# Patient Record
Sex: FEMALE | Race: WHITE | NOT HISPANIC OR LATINO | Employment: OTHER | URBAN - METROPOLITAN AREA
[De-identification: names, ages, dates, MRNs, and addresses within clinical notes are randomized per-mention and may not be internally consistent; named-entity substitution may affect disease eponyms.]

---

## 2023-08-21 ENCOUNTER — HOSPITAL ENCOUNTER (OUTPATIENT)
Dept: DATA CONVERSION | Facility: HOSPITAL | Age: 78
Discharge: HOME | End: 2023-08-21
Payer: MEDICARE

## 2023-08-21 DIAGNOSIS — M79.672 PAIN IN LEFT FOOT: ICD-10-CM

## 2023-08-25 PROBLEM — G47.33 OSA (OBSTRUCTIVE SLEEP APNEA): Status: ACTIVE | Noted: 2023-08-25

## 2023-08-25 PROBLEM — E78.5 HYPERLIPIDEMIA: Status: ACTIVE | Noted: 2023-08-25

## 2023-08-25 PROBLEM — E03.9 HYPOTHYROIDISM: Status: ACTIVE | Noted: 2023-08-25

## 2023-08-25 PROBLEM — I49.1 PAC (PREMATURE ATRIAL CONTRACTION): Status: ACTIVE | Noted: 2023-08-25

## 2023-08-25 PROBLEM — N95.2 POSTMENOPAUSAL ATROPHIC VAGINITIS: Status: ACTIVE | Noted: 2023-08-25

## 2023-08-25 PROBLEM — R89.9 ABNORMAL LABORATORY TEST RESULT: Status: ACTIVE | Noted: 2023-08-25

## 2023-08-25 PROBLEM — I48.4 ATYPICAL ATRIAL FLUTTER (MULTI): Status: ACTIVE | Noted: 2023-08-25

## 2023-08-25 PROBLEM — R40.0 DAYTIME SLEEPINESS: Status: ACTIVE | Noted: 2023-08-25

## 2023-08-25 PROBLEM — M81.0 OSTEOPOROSIS: Status: ACTIVE | Noted: 2023-08-25

## 2023-08-25 PROBLEM — I10 ESSENTIAL HYPERTENSION: Status: ACTIVE | Noted: 2023-08-25

## 2023-08-25 PROBLEM — I48.0 PAROXYSMAL ATRIAL FIBRILLATION (MULTI): Status: ACTIVE | Noted: 2023-08-25

## 2023-08-25 RX ORDER — CHOLECALCIFEROL (VITAMIN D3) 50 MCG
2000 TABLET ORAL DAILY
COMMUNITY
End: 2024-03-17 | Stop reason: HOSPADM

## 2023-08-25 RX ORDER — ESTRADIOL 10 UG/1
10 INSERT VAGINAL 2 TIMES WEEKLY
COMMUNITY
End: 2024-02-02 | Stop reason: SDUPTHER

## 2023-08-25 RX ORDER — FUROSEMIDE 20 MG/1
1 TABLET ORAL DAILY
COMMUNITY
Start: 2023-01-05

## 2023-08-25 RX ORDER — APIXABAN 5 MG/1
5 TABLET, FILM COATED ORAL 2 TIMES DAILY
COMMUNITY
Start: 2023-05-28 | End: 2023-12-19 | Stop reason: SDUPTHER

## 2023-08-25 RX ORDER — METOPROLOL SUCCINATE 25 MG/1
0.5 TABLET, EXTENDED RELEASE ORAL DAILY
COMMUNITY
Start: 2023-01-05 | End: 2023-12-19 | Stop reason: SDUPTHER

## 2023-08-25 RX ORDER — FLECAINIDE ACETATE 50 MG/1
1 TABLET ORAL 2 TIMES DAILY
COMMUNITY
End: 2023-12-19 | Stop reason: SDUPTHER

## 2023-08-25 RX ORDER — TRIAMTERENE/HYDROCHLOROTHIAZID 37.5-25 MG
0.5 TABLET ORAL EVERY MORNING
COMMUNITY
End: 2023-12-21 | Stop reason: SDUPTHER

## 2023-08-25 RX ORDER — SIMVASTATIN 20 MG/1
20 TABLET, FILM COATED ORAL DAILY
COMMUNITY
Start: 2023-08-05 | End: 2024-02-02 | Stop reason: SDUPTHER

## 2023-08-25 RX ORDER — DENOSUMAB 60 MG/ML
INJECTION SUBCUTANEOUS
COMMUNITY
End: 2024-03-17 | Stop reason: HOSPADM

## 2023-08-25 RX ORDER — LEVOTHYROXINE SODIUM 50 UG/1
TABLET ORAL DAILY
COMMUNITY
End: 2023-12-21 | Stop reason: SDUPTHER

## 2023-09-15 VITALS
HEIGHT: 65 IN | WEIGHT: 163 LBS | DIASTOLIC BLOOD PRESSURE: 60 MMHG | SYSTOLIC BLOOD PRESSURE: 126 MMHG | OXYGEN SATURATION: 95 % | BODY MASS INDEX: 27.16 KG/M2 | HEART RATE: 84 BPM

## 2023-10-11 ENCOUNTER — OFFICE VISIT (OUTPATIENT)
Dept: PRIMARY CARE | Facility: CLINIC | Age: 78
End: 2023-10-11
Payer: MEDICARE

## 2023-10-11 VITALS — DIASTOLIC BLOOD PRESSURE: 70 MMHG | SYSTOLIC BLOOD PRESSURE: 120 MMHG | HEART RATE: 66 BPM | OXYGEN SATURATION: 99 %

## 2023-10-11 DIAGNOSIS — M81.0 AGE-RELATED OSTEOPOROSIS WITHOUT CURRENT PATHOLOGICAL FRACTURE: ICD-10-CM

## 2023-10-11 DIAGNOSIS — M25.561 CHRONIC PAIN OF RIGHT KNEE: ICD-10-CM

## 2023-10-11 DIAGNOSIS — I48.4 ATYPICAL ATRIAL FLUTTER (MULTI): ICD-10-CM

## 2023-10-11 DIAGNOSIS — E78.2 MIXED HYPERLIPIDEMIA: ICD-10-CM

## 2023-10-11 DIAGNOSIS — G47.33 OSA (OBSTRUCTIVE SLEEP APNEA): ICD-10-CM

## 2023-10-11 DIAGNOSIS — I48.0 PAROXYSMAL ATRIAL FIBRILLATION (MULTI): ICD-10-CM

## 2023-10-11 DIAGNOSIS — I10 ESSENTIAL HYPERTENSION: Primary | ICD-10-CM

## 2023-10-11 DIAGNOSIS — E03.9 ACQUIRED HYPOTHYROIDISM: ICD-10-CM

## 2023-10-11 DIAGNOSIS — G89.29 CHRONIC PAIN OF RIGHT KNEE: ICD-10-CM

## 2023-10-11 PROBLEM — R89.9 ABNORMAL LABORATORY TEST RESULT: Status: RESOLVED | Noted: 2023-08-25 | Resolved: 2023-10-11

## 2023-10-11 PROBLEM — R40.0 DAYTIME SLEEPINESS: Status: RESOLVED | Noted: 2023-08-25 | Resolved: 2023-10-11

## 2023-10-11 PROCEDURE — 99214 OFFICE O/P EST MOD 30 MIN: CPT | Performed by: INTERNAL MEDICINE

## 2023-10-11 PROCEDURE — 1036F TOBACCO NON-USER: CPT | Performed by: INTERNAL MEDICINE

## 2023-10-11 PROCEDURE — 3078F DIAST BP <80 MM HG: CPT | Performed by: INTERNAL MEDICINE

## 2023-10-11 PROCEDURE — 1125F AMNT PAIN NOTED PAIN PRSNT: CPT | Performed by: INTERNAL MEDICINE

## 2023-10-11 PROCEDURE — 3074F SYST BP LT 130 MM HG: CPT | Performed by: INTERNAL MEDICINE

## 2023-10-11 PROCEDURE — 2500000004 HC RX 250 GENERAL PHARMACY W/ HCPCS (ALT 636 FOR OP/ED): Mod: JZ | Performed by: INTERNAL MEDICINE

## 2023-10-11 RX ADMIN — DENOSUMAB 60 MG: 60 INJECTION SUBCUTANEOUS at 10:23

## 2023-10-11 ASSESSMENT — ENCOUNTER SYMPTOMS
PSYCHIATRIC NEGATIVE: 1
CONSTITUTIONAL NEGATIVE: 1
LOSS OF SENSATION IN FEET: 0
DYSURIA: 0
COUGH: 0
MYALGIAS: 1
OCCASIONAL FEELINGS OF UNSTEADINESS: 0
ACTIVITY CHANGE: 0
DIARRHEA: 0
ABDOMINAL PAIN: 0
ARTHRALGIAS: 1
CONSTIPATION: 0
DEPRESSION: 0
CARDIOVASCULAR NEGATIVE: 1
SHORTNESS OF BREATH: 0

## 2023-10-11 ASSESSMENT — COLUMBIA-SUICIDE SEVERITY RATING SCALE - C-SSRS
6. HAVE YOU EVER DONE ANYTHING, STARTED TO DO ANYTHING, OR PREPARED TO DO ANYTHING TO END YOUR LIFE?: NO
2. HAVE YOU ACTUALLY HAD ANY THOUGHTS OF KILLING YOURSELF?: NO
1. IN THE PAST MONTH, HAVE YOU WISHED YOU WERE DEAD OR WISHED YOU COULD GO TO SLEEP AND NOT WAKE UP?: NO

## 2023-10-11 ASSESSMENT — PATIENT HEALTH QUESTIONNAIRE - PHQ9
2. FEELING DOWN, DEPRESSED OR HOPELESS: NOT AT ALL
1. LITTLE INTEREST OR PLEASURE IN DOING THINGS: NOT AT ALL
SUM OF ALL RESPONSES TO PHQ9 QUESTIONS 1 AND 2: 0

## 2023-10-11 ASSESSMENT — PAIN SCALES - GENERAL: PAINLEVEL: 7

## 2023-10-11 NOTE — PROGRESS NOTES
Subjective   Patient ID: Jessy Mcdaniel is a 78 y.o. female who presents for Follow-up.    Hyperlipidemia  Jessy Mcdaniel is a 78 y.o. female who presents for follow-up of dyslipidemia. A repeat fasting lipid profile was done. LDL 46  4/2023. The patient does not use medications that may worsen dyslipidemias (corticosteroids, progestins, anabolic steroids, diuretics, beta-blockers, amiodarone, cyclosporine, olanzapine). Exercise: intermittently. Previous history of cardiac disease includes: none.    Hypothyroidism  Jessy Mcdaniel is a 78 y.o. female who presents for follow up of hypothyroidism. Current symptoms: none . Patient denies heat / cold intolerance. Symptoms have been well-controlled.  Last TSH nl  4/2023    Atrial Fibrillationnon  Patient is a 78 y.o. female who presents for follow-up of atrial fibrillation and atrial flutter. Symptoms include none. Have been stable since that time. Associated symptoms include: none. The patient denies dizziness, leg swelling, palpitations, and rapid heart beat. The patient has a past history of atrial fibrillation and atrial flutter.      BP Readings from Last 1 Encounters:  08/21/23 : 126/60       Wakes in middle of night w/ excrutiating pain around R knee. Eases after awhile when changes position. Can't walk  as far because R knee hurts and she is limping.  Began about 6 mths ago-slowly worsening    Osteopoerosis on prolia-last DEXA 2022           Review of Systems   Constitutional: Negative.  Negative for activity change.   Respiratory:  Negative for cough and shortness of breath.    Cardiovascular: Negative.    Gastrointestinal:  Negative for abdominal pain, constipation and diarrhea.   Genitourinary:  Negative for dysuria.   Musculoskeletal:  Positive for arthralgias and myalgias.        R knee pain   Skin:  Negative for rash.   Psychiatric/Behavioral: Negative.          Excited about great-granddtr now expecting another       Objective   /70   Pulse 66   LMP   (LMP Unknown)   SpO2 99%     Physical Exam  Constitutional:       General: She is not in acute distress.     Appearance: Normal appearance.   Cardiovascular:      Rate and Rhythm: Normal rate and regular rhythm.      Heart sounds: Normal heart sounds. No murmur heard.     No gallop.   Pulmonary:      Breath sounds: Normal breath sounds. No wheezing, rhonchi or rales.   Musculoskeletal:         General: Swelling and tenderness present.      Comments: Unable to fully straighten R knee w/o clear effusion   Skin:     General: Skin is warm and dry.      Findings: No rash.   Neurological:      General: No focal deficit present.      Mental Status: She is alert and oriented to person, place, and time.   Psychiatric:         Mood and Affect: Mood normal.         Assessment/Plan        Jessy was seen today for follow-up.  Diagnoses and all orders for this visit:  Essential hypertension (Primary)  Atypical atrial flutter (CMS/HCC)  Mixed hyperlipidemia  Paroxysmal atrial fibrillation (CMS/HCC)  Acquired hypothyroidism  Age-related osteoporosis without current pathological fracture  -     denosumab (Prolia) injection 60 mg  SHANDA (obstructive sleep apnea)  Chronic pain of right knee  -     Referral to Orthopaedic Surgery; Future  Other orders  -     Follow Up In Primary Care - Established; Future

## 2023-10-23 PROBLEM — H25.13 NUCLEAR SCLEROTIC CATARACT OF BOTH EYES: Status: ACTIVE | Noted: 2018-10-15

## 2023-10-23 PROBLEM — I48.20 CHRONIC ATRIAL FIBRILLATION (MULTI): Status: ACTIVE | Noted: 2018-10-15

## 2023-10-23 PROBLEM — H25.10 NUCLEAR SCLEROSIS: Status: ACTIVE | Noted: 2018-08-07

## 2023-10-23 RX ORDER — PREDNISOLONE ACETATE 10 MG/ML
1 SUSPENSION/ DROPS OPHTHALMIC 2 TIMES DAILY
COMMUNITY
Start: 2020-05-04 | End: 2024-01-19 | Stop reason: ALTCHOICE

## 2023-10-23 RX ORDER — ALENDRONATE SODIUM 70 MG/1
70 TABLET ORAL
COMMUNITY
End: 2024-01-19 | Stop reason: ALTCHOICE

## 2023-10-23 RX ORDER — AMLODIPINE BESYLATE 5 MG/1
5 TABLET ORAL
COMMUNITY
End: 2024-01-19 | Stop reason: ALTCHOICE

## 2023-10-23 RX ORDER — AMIODARONE HYDROCHLORIDE 200 MG/1
200 TABLET ORAL
COMMUNITY
End: 2023-12-21 | Stop reason: ALTCHOICE

## 2023-10-26 ENCOUNTER — HOSPITAL ENCOUNTER (OUTPATIENT)
Dept: RADIOLOGY | Facility: CLINIC | Age: 78
Discharge: HOME | End: 2023-10-26
Payer: MEDICARE

## 2023-10-26 ENCOUNTER — OFFICE VISIT (OUTPATIENT)
Dept: ORTHOPEDIC SURGERY | Facility: CLINIC | Age: 78
End: 2023-10-26
Payer: MEDICARE

## 2023-10-26 VITALS — BODY MASS INDEX: 27.16 KG/M2 | WEIGHT: 163 LBS | HEIGHT: 65 IN

## 2023-10-26 DIAGNOSIS — M25.561 RIGHT KNEE PAIN, UNSPECIFIED CHRONICITY: Primary | ICD-10-CM

## 2023-10-26 DIAGNOSIS — M17.11 OSTEOARTHRITIS OF RIGHT KNEE, UNSPECIFIED OSTEOARTHRITIS TYPE: ICD-10-CM

## 2023-10-26 DIAGNOSIS — M25.562 LEFT KNEE PAIN, UNSPECIFIED CHRONICITY: ICD-10-CM

## 2023-10-26 DIAGNOSIS — M17.12 OSTEOARTHRITIS OF LEFT KNEE, UNSPECIFIED OSTEOARTHRITIS TYPE: ICD-10-CM

## 2023-10-26 DIAGNOSIS — M25.569 KNEE PAIN: ICD-10-CM

## 2023-10-26 PROCEDURE — 1160F RVW MEDS BY RX/DR IN RCRD: CPT | Performed by: ORTHOPAEDIC SURGERY

## 2023-10-26 PROCEDURE — 73560 X-RAY EXAM OF KNEE 1 OR 2: CPT | Mod: 50,FY

## 2023-10-26 PROCEDURE — 20610 DRAIN/INJ JOINT/BURSA W/O US: CPT | Mod: 50 | Performed by: ORTHOPAEDIC SURGERY

## 2023-10-26 PROCEDURE — 99203 OFFICE O/P NEW LOW 30 MIN: CPT | Performed by: ORTHOPAEDIC SURGERY

## 2023-10-26 PROCEDURE — 1159F MED LIST DOCD IN RCRD: CPT | Performed by: ORTHOPAEDIC SURGERY

## 2023-10-26 PROCEDURE — 1036F TOBACCO NON-USER: CPT | Performed by: ORTHOPAEDIC SURGERY

## 2023-10-26 PROCEDURE — 2500000004 HC RX 250 GENERAL PHARMACY W/ HCPCS (ALT 636 FOR OP/ED): Performed by: ORTHOPAEDIC SURGERY

## 2023-10-26 PROCEDURE — 2500000005 HC RX 250 GENERAL PHARMACY W/O HCPCS: Performed by: ORTHOPAEDIC SURGERY

## 2023-10-26 PROCEDURE — 1125F AMNT PAIN NOTED PAIN PRSNT: CPT | Performed by: ORTHOPAEDIC SURGERY

## 2023-10-26 PROCEDURE — 99213 OFFICE O/P EST LOW 20 MIN: CPT | Performed by: ORTHOPAEDIC SURGERY

## 2023-10-26 RX ORDER — LIDOCAINE HYDROCHLORIDE 10 MG/ML
1 INJECTION INFILTRATION; PERINEURAL
Status: COMPLETED | OUTPATIENT
Start: 2023-10-26 | End: 2023-10-26

## 2023-10-26 RX ORDER — TRIAMCINOLONE ACETONIDE 40 MG/ML
40 INJECTION, SUSPENSION INTRA-ARTICULAR; INTRAMUSCULAR
Status: COMPLETED | OUTPATIENT
Start: 2023-10-26 | End: 2023-10-26

## 2023-10-26 RX ADMIN — TRIAMCINOLONE ACETONIDE 40 MG: 400 INJECTION, SUSPENSION INTRA-ARTICULAR; INTRAMUSCULAR at 15:33

## 2023-10-26 RX ADMIN — LIDOCAINE HYDROCHLORIDE 1 ML: 10 INJECTION, SOLUTION INFILTRATION; PERINEURAL at 15:33

## 2023-10-26 ASSESSMENT — PAIN - FUNCTIONAL ASSESSMENT: PAIN_FUNCTIONAL_ASSESSMENT: 0-10

## 2023-10-26 ASSESSMENT — ENCOUNTER SYMPTOMS
LOSS OF SENSATION IN FEET: 0
OCCASIONAL FEELINGS OF UNSTEADINESS: 1
DEPRESSION: 0

## 2023-10-26 ASSESSMENT — PAIN SCALES - GENERAL: PAINLEVEL_OUTOF10: 6

## 2023-10-26 NOTE — PROGRESS NOTES
Subjective    Patient ID: Jessy Mcdaniel is a 78 y.o. female.    Chief Complaint  Last Surgery: No surgery found  Last Surgery Date: No surgery found      Objective   Ortho Exam  HEENT: No bruising, otorrhea, rhinorrhea. MUSCULOSKELETAL: Neck: No tenderness. No pain or limitation with range of motion. Back: No tenderness. Straight leg test negative bilaterally. Right and left hips: Nontender. No pain or limitation with ROM. Right and left knee: There is pain, crepitus with and limitation of ROM. McMurrays is negative. Anterior drawer and lachmans are negative. No effusion present. The knees are somewhat unstable to valgus and varus stressing. The patient walks with a painful gait favoring both knees while walking.  Image strays of the right and left knees done and read in the office today are reviewed with the patient in the office today and show severe osteoarthritis of the right and left knees with loss of joint surface cartilage, spurring, sclerosis and bone-on-bone worse on the right knee.  No acute fracture is evident.  Results:  XR knee 1-2 views bilateral  These images are not reportable by radiology and will not be interpreted   by  Radiologists.      Assessment/Plan   Encounter Diagnoses:  Right knee pain, unspecified chronicity    Left knee pain, unspecified chronicity    Osteoarthritis of right knee, unspecified osteoarthritis type    Osteoarthritis of left knee, unspecified osteoarthritis type    No orders of the defined types were placed in this encounter.  Options are discussed with the patient in detail. The patient is instructed regarding activity modification, ice, physician directed at home gentle strengthening and ROM exercises, and the appropriate use of Tylenol as needed for pain with its potential adverse reactions and side effects. The patient understands. The patient states that despite all the treatment listed above that this left and right knee pain is debilitating and  requests a  discussion of further options. Cortisone injection to the left and right knee is discussed in the office today. This is done in the office today. See procedures below. Return as needed, Please note that this report has been produced using speech recognition software.  It may contain errors related to grammar, punctuation or spelling.  Electronically signed, but not reviewed.   Patient ID: Jessy Mcdaniel is a 78 y.o. female.    L Inj/Asp: bilateral knee on 10/26/2023 3:33 PM  Indications: pain  Details: 22 G needle, medial approach  Medications (Right): 40 mg triamcinolone acetonide 40 mg/mL; 1 mL lidocaine 10 mg/mL (1 %)  Medications (Left): 40 mg triamcinolone acetonide 40 mg/mL; 1 mL lidocaine 10 mg/mL (1 %)  Outcome: tolerated well, no immediate complications  Procedure, treatment alternatives, risks and benefits explained, specific risks discussed. Consent was given by the patient. Immediately prior to procedure a time out was called to verify the correct patient, procedure, equipment, support staff and site/side marked as required. Patient was prepped and draped in the usual sterile fashion.         No follow-ups on file.

## 2023-12-14 ENCOUNTER — OFFICE VISIT (OUTPATIENT)
Dept: ORTHOPEDIC SURGERY | Facility: CLINIC | Age: 78
End: 2023-12-14
Payer: MEDICARE

## 2023-12-14 VITALS — WEIGHT: 162.92 LBS | BODY MASS INDEX: 27.11 KG/M2

## 2023-12-14 DIAGNOSIS — M25.561 CHRONIC PAIN OF RIGHT KNEE: ICD-10-CM

## 2023-12-14 DIAGNOSIS — M17.11 OSTEOARTHRITIS OF RIGHT KNEE, UNSPECIFIED OSTEOARTHRITIS TYPE: Primary | ICD-10-CM

## 2023-12-14 DIAGNOSIS — M25.562 LEFT KNEE PAIN, UNSPECIFIED CHRONICITY: ICD-10-CM

## 2023-12-14 DIAGNOSIS — G89.29 CHRONIC PAIN OF RIGHT KNEE: ICD-10-CM

## 2023-12-14 DIAGNOSIS — M17.12 OSTEOARTHRITIS OF LEFT KNEE, UNSPECIFIED OSTEOARTHRITIS TYPE: ICD-10-CM

## 2023-12-14 PROCEDURE — 1159F MED LIST DOCD IN RCRD: CPT | Performed by: ORTHOPAEDIC SURGERY

## 2023-12-14 PROCEDURE — 99213 OFFICE O/P EST LOW 20 MIN: CPT | Performed by: ORTHOPAEDIC SURGERY

## 2023-12-14 PROCEDURE — 1160F RVW MEDS BY RX/DR IN RCRD: CPT | Performed by: ORTHOPAEDIC SURGERY

## 2023-12-14 PROCEDURE — 1036F TOBACCO NON-USER: CPT | Performed by: ORTHOPAEDIC SURGERY

## 2023-12-14 PROCEDURE — 1125F AMNT PAIN NOTED PAIN PRSNT: CPT | Performed by: ORTHOPAEDIC SURGERY

## 2023-12-14 ASSESSMENT — PATIENT HEALTH QUESTIONNAIRE - PHQ9
SUM OF ALL RESPONSES TO PHQ9 QUESTIONS 1 AND 2: 0
1. LITTLE INTEREST OR PLEASURE IN DOING THINGS: NOT AT ALL
2. FEELING DOWN, DEPRESSED OR HOPELESS: NOT AT ALL

## 2023-12-14 ASSESSMENT — PAIN - FUNCTIONAL ASSESSMENT: PAIN_FUNCTIONAL_ASSESSMENT: 0-10

## 2023-12-14 ASSESSMENT — PAIN DESCRIPTION - DESCRIPTORS: DESCRIPTORS: ACHING;TIGHTNESS

## 2023-12-14 ASSESSMENT — LIFESTYLE VARIABLES: TOTAL SCORE: 0

## 2023-12-14 ASSESSMENT — ENCOUNTER SYMPTOMS
LOSS OF SENSATION IN FEET: 0
OCCASIONAL FEELINGS OF UNSTEADINESS: 0
DEPRESSION: 0

## 2023-12-14 ASSESSMENT — PAIN SCALES - GENERAL: PAINLEVEL_OUTOF10: 6

## 2023-12-14 NOTE — PROGRESS NOTES
Subjective    Patient ID: Jessy Mcdaniel is a 78 y.o. female.    History:    Subjective      Chief Complaint   Patient presents with    Right Knee - Pain    Left Knee - Pain        No surgery found     HPI  This   78 year young  woman presents today with  right knee pain (8/10). The patient states that this  right knee pain has been worsening and persistent for  years. The patient denies trauma or injury to the  right knee. The patient states that the  right knee pain is worse with and aggravated by  getting from a sitting to a standing position, attempting to go up or down stairs and also  walking and standing. The patient states that this  right knee pain impairs their ability to complete normal activities of daily living. The patient has tried Tylenol and ibuprofen with no relief.    CARDIOLOGY:   Negative for chest pain, shortness of breath.   RESPIRATORY:   Negative for chest pain, shortness of breath.   MUSCULOSKELETAL:   See HPI for details.   NEUROLOGY:   Negative for tingling, numbness, weakness.    Objective    There were no vitals filed for this visit.    Physical Exam  GENERAL:          General Appearance:  This is a pleasant patient with appropriate affect, in no acute distress.   DERMATOLOGY:          Skin: skin at the neck, upper and lower back, and trunk is intact. There is no evidence of skin rash, skin breakdown or ulceration, or atrophic skin change.   EXTREMITIES:          Vascular:  Right, left hands and feet are warm with good color and pulses. Right and left calf and thigh are nontender and nonswollen.   NEUROLOGICAL:          Orientation:  Patient is alert and oriented to person, place, time and situation. Right and left upper and lower extremity motor and sensory examinations are intact.      MUSCULOSKELETAL: Neck: Nontender. No pain with range of motion.  back: No tenderness. Straight leg test negative bilaterally. Right and left hips: Nontender. No pain or limitation with ROM. Right and  left knees:: There is diffuse tenderness over the knee. There is pain  with, crepitus with and limitation with ROM. McMurrays is negative. Anterior drawer and lachmans are negative. No effusion present. The knee is stable to valgus and varus stressing. The patient walks with a painful gait favoring the  right knee while walking.    No results found.    previous x-rays of the right knee show severe osteoarthritis of the right knee with severe loss of joint surface cartilage.  I reviewed these x-rays with the patient in the office today  Jessy was seen today for pain and pain.  Diagnoses and all orders for this visit:  Osteoarthritis of right knee, unspecified osteoarthritis type (Primary)  Left knee pain, unspecified chronicity  Chronic pain of right knee  -     Referral to Orthopaedic Surgery  Osteoarthritis of left knee, unspecified osteoarthritis type  Other orders  -     Cancel: L Inj/Asp       Nic Guerra MD       Chief Complaint  Last Surgery: No surgery found  Last Surgery Date: No surgery found      Objective   Ortho Exam  HEENT: No bruising, otorrhea, rhinorrhea. MUSCULOSKELETAL: Neck: No tenderness. No pain or limitation with range of motion. Back: No tenderness. Straight leg test negative bilaterally. Right and left hips: Nontender. No pain or limitation with ROM. Right and left knee: There is pain, crepitus with and limitation of ROM. McMurrays is negative. Anterior drawer and lachmans are negative. No effusion present. The knees are somewhat unstable to valgus and varus stressing. The patient walks with a painful gait favoring both knees while walking.  Image strays of the right and left knees done and read in the office today are reviewed with the patient in the office today and show severe osteoarthritis of the right and left knees with loss of joint surface cartilage, spurring, sclerosis and bone-on-bone worse on the right knee.  No acute fracture is evident.  Results:  XR knee 1-2 views  bilateral  These images are not reportable by radiology and will not be interpreted   by  Radiologists.    Patient ID: Jessy Mcdaniel is a 78 y.o. female.    Procedures  Assessment/Plan   Encounter Diagnoses:  Osteoarthritis of right knee, unspecified osteoarthritis type    Left knee pain, unspecified chronicity    Chronic pain of right knee    Osteoarthritis of left knee, unspecified osteoarthritis type    No orders of the defined types were placed in this encounter.   options are discussed with the patient in detail. The patient is instructed regarding ice, activity modification and risk for further injury with falling or trauma, and the use of a cane at all times for balance and support and continuing with physician directed at home gentle strengthening and range of motion exercises, and the appropriate use of Tylenol as needed for pain with its potential adverse reactions and side effects. The patient understands. The patient states that this right knee pain is debilitating. The patient states that since this right knee pain has recurred that it is disabling and impairs the patient´s ability to walk and do other activities that are important. AP and lateral x-rays show osteoarthritis of the right knee that is worse at the medial compartment with loss of joint surface cartilage, bone on bone and sclerosis. Right total knee replacement with indications, alternatives, potential risks including but not limited to blood loss, blood clot, nerve damage, infection, death and stroke, benefits, unforseen risks, the rehab involved and the fact that no guarantee can be made were all discussed with the patient in detail. The patient understands, accepts and wishes to proceed because of the persistent right knee pain and the fact that activity modification, gentle strengthening and ROM exercises, physical therapy, Tylenol, ibuprofen, cortisone injections and visco supplementation did not relieve the right knee pain and  because this right knee pain impairs the patient´s ability to complete the normal activities of daily living . I agree. We will be setting this up at a time that is convenient for the patient and as the schedule allows. The patient is to follow up as needed. Please note that this report has been produced using speech recognition software.  It may contain errors related to grammar, punctuation or spelling.  Electronically signed, but not reviewed.Return as needed, Please note that this report has been produced using speech recognition software.  It may contain errors related to grammar, punctuation or spelling.  Electronically signed, but not reviewed.   Patient ID: Jessy Mcdaniel is a 78 y.o. female.      No follow-ups on file.

## 2023-12-19 DIAGNOSIS — I48.0 PAROXYSMAL ATRIAL FIBRILLATION (MULTI): ICD-10-CM

## 2023-12-19 DIAGNOSIS — I49.1 PAC (PREMATURE ATRIAL CONTRACTION): ICD-10-CM

## 2023-12-20 RX ORDER — FLECAINIDE ACETATE 50 MG/1
50 TABLET ORAL 2 TIMES DAILY
Qty: 180 TABLET | Refills: 3 | Status: SHIPPED | OUTPATIENT
Start: 2023-12-20 | End: 2023-12-21 | Stop reason: SDUPTHER

## 2023-12-20 RX ORDER — APIXABAN 5 MG/1
5 TABLET, FILM COATED ORAL 2 TIMES DAILY
Qty: 180 TABLET | Refills: 3 | Status: SHIPPED | OUTPATIENT
Start: 2023-12-20 | End: 2023-12-21 | Stop reason: SDUPTHER

## 2023-12-20 RX ORDER — METOPROLOL SUCCINATE 25 MG/1
12.5 TABLET, EXTENDED RELEASE ORAL DAILY
Qty: 45 TABLET | Refills: 3 | Status: SHIPPED | OUTPATIENT
Start: 2023-12-20 | End: 2024-01-19 | Stop reason: ALTCHOICE

## 2023-12-21 ENCOUNTER — OFFICE VISIT (OUTPATIENT)
Dept: CARDIOLOGY | Facility: CLINIC | Age: 78
End: 2023-12-21
Payer: MEDICARE

## 2023-12-21 ENCOUNTER — LAB (OUTPATIENT)
Dept: LAB | Facility: LAB | Age: 78
End: 2023-12-21
Payer: MEDICARE

## 2023-12-21 VITALS — DIASTOLIC BLOOD PRESSURE: 62 MMHG | SYSTOLIC BLOOD PRESSURE: 128 MMHG

## 2023-12-21 DIAGNOSIS — I49.1 PAC (PREMATURE ATRIAL CONTRACTION): ICD-10-CM

## 2023-12-21 DIAGNOSIS — I48.0 PAROXYSMAL ATRIAL FIBRILLATION (MULTI): ICD-10-CM

## 2023-12-21 LAB
ALBUMIN SERPL-MCNC: 4.4 G/DL (ref 3.5–5)
ALP BLD-CCNC: 58 U/L (ref 35–125)
ALT SERPL-CCNC: 9 U/L (ref 5–40)
ANION GAP SERPL CALC-SCNC: 10 MMOL/L
AST SERPL-CCNC: 15 U/L (ref 5–40)
BASOPHILS # BLD AUTO: 0.01 X10*3/UL (ref 0–0.1)
BASOPHILS NFR BLD AUTO: 0.2 %
BILIRUB SERPL-MCNC: 0.4 MG/DL (ref 0.1–1.2)
BUN SERPL-MCNC: 19 MG/DL (ref 8–25)
CALCIUM SERPL-MCNC: 9.6 MG/DL (ref 8.5–10.4)
CHLORIDE SERPL-SCNC: 104 MMOL/L (ref 97–107)
CO2 SERPL-SCNC: 28 MMOL/L (ref 24–31)
CREAT SERPL-MCNC: 0.9 MG/DL (ref 0.4–1.6)
EOSINOPHIL # BLD AUTO: 0.03 X10*3/UL (ref 0–0.4)
EOSINOPHIL NFR BLD AUTO: 0.6 %
ERYTHROCYTE [DISTWIDTH] IN BLOOD BY AUTOMATED COUNT: 13.4 % (ref 11.5–14.5)
GFR SERPL CREATININE-BSD FRML MDRD: 65 ML/MIN/1.73M*2
GLUCOSE SERPL-MCNC: 99 MG/DL (ref 65–99)
HCT VFR BLD AUTO: 37.7 % (ref 36–46)
HGB BLD-MCNC: 11.7 G/DL (ref 12–16)
IMM GRANULOCYTES # BLD AUTO: 0.01 X10*3/UL (ref 0–0.5)
IMM GRANULOCYTES NFR BLD AUTO: 0.2 % (ref 0–0.9)
LYMPHOCYTES # BLD AUTO: 1.66 X10*3/UL (ref 0.8–3)
LYMPHOCYTES NFR BLD AUTO: 34.1 %
MCH RBC QN AUTO: 30.9 PG (ref 26–34)
MCHC RBC AUTO-ENTMCNC: 31 G/DL (ref 32–36)
MCV RBC AUTO: 100 FL (ref 80–100)
MONOCYTES # BLD AUTO: 0.36 X10*3/UL (ref 0.05–0.8)
MONOCYTES NFR BLD AUTO: 7.4 %
NEUTROPHILS # BLD AUTO: 2.8 X10*3/UL (ref 1.6–5.5)
NEUTROPHILS NFR BLD AUTO: 57.5 %
NRBC BLD-RTO: 0 /100 WBCS (ref 0–0)
PLATELET # BLD AUTO: 289 X10*3/UL (ref 150–450)
POTASSIUM SERPL-SCNC: 4.4 MMOL/L (ref 3.4–5.1)
PROT SERPL-MCNC: 6.7 G/DL (ref 5.9–7.9)
RBC # BLD AUTO: 3.79 X10*6/UL (ref 4–5.2)
SODIUM SERPL-SCNC: 142 MMOL/L (ref 133–145)
WBC # BLD AUTO: 4.9 X10*3/UL (ref 4.4–11.3)

## 2023-12-21 PROCEDURE — 93005 ELECTROCARDIOGRAM TRACING: CPT | Performed by: INTERNAL MEDICINE

## 2023-12-21 PROCEDURE — 80053 COMPREHEN METABOLIC PANEL: CPT

## 2023-12-21 PROCEDURE — 1036F TOBACCO NON-USER: CPT | Performed by: INTERNAL MEDICINE

## 2023-12-21 PROCEDURE — 3078F DIAST BP <80 MM HG: CPT | Performed by: INTERNAL MEDICINE

## 2023-12-21 PROCEDURE — 3074F SYST BP LT 130 MM HG: CPT | Performed by: INTERNAL MEDICINE

## 2023-12-21 PROCEDURE — 36415 COLL VENOUS BLD VENIPUNCTURE: CPT

## 2023-12-21 PROCEDURE — 93010 ELECTROCARDIOGRAM REPORT: CPT | Performed by: INTERNAL MEDICINE

## 2023-12-21 PROCEDURE — 1125F AMNT PAIN NOTED PAIN PRSNT: CPT | Performed by: INTERNAL MEDICINE

## 2023-12-21 PROCEDURE — 99214 OFFICE O/P EST MOD 30 MIN: CPT | Performed by: INTERNAL MEDICINE

## 2023-12-21 PROCEDURE — 1159F MED LIST DOCD IN RCRD: CPT | Performed by: INTERNAL MEDICINE

## 2023-12-21 PROCEDURE — 1160F RVW MEDS BY RX/DR IN RCRD: CPT | Performed by: INTERNAL MEDICINE

## 2023-12-21 PROCEDURE — 85025 COMPLETE CBC W/AUTO DIFF WBC: CPT

## 2023-12-21 RX ORDER — LEVOTHYROXINE SODIUM 50 UG/1
50 TABLET ORAL DAILY
Qty: 30 TABLET | Refills: 3 | Status: SHIPPED | OUTPATIENT
Start: 2023-12-21 | End: 2024-04-25 | Stop reason: SDUPTHER

## 2023-12-21 RX ORDER — APIXABAN 5 MG/1
5 TABLET, FILM COATED ORAL 2 TIMES DAILY
Qty: 180 TABLET | Refills: 3 | Status: SHIPPED | OUTPATIENT
Start: 2023-12-21

## 2023-12-21 RX ORDER — FLECAINIDE ACETATE 50 MG/1
50 TABLET ORAL 2 TIMES DAILY
Qty: 180 TABLET | Refills: 3 | Status: SHIPPED | OUTPATIENT
Start: 2023-12-21 | End: 2024-12-15

## 2023-12-21 RX ORDER — TRIAMTERENE/HYDROCHLOROTHIAZID 37.5-25 MG
0.5 TABLET ORAL EVERY MORNING
Qty: 15 TABLET | Refills: 3 | Status: SHIPPED | OUTPATIENT
Start: 2023-12-21 | End: 2024-04-22 | Stop reason: SDUPTHER

## 2023-12-21 NOTE — PROGRESS NOTES
No chief complaint on file.           The patient is a 78-year-old female who is well-known to me.  She has a history of paroxysmal atrial fibrillation and flutter post catheter-based therapy in 2019.  She was initially maintained on amiodarone but had ophthalmologic complications and she has been on flecainide.  This in the setting of preserved LV systolic function and calcium coronary CT score of 0..  She is here today in regular follow-up.  She is doing quite well from the atrial fibrillation standpoint with no recurrent arrhythmias.  However she is having quite a bit of difficulty with her right knee pain.  She has been receiving cortisone injections but is scheduled to have total knee replacement in the near future.         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    Tear of medial meniscus of knee 02/09/2009    Tear of lateral cartilage or meniscus of knee, current 02/09/2009    Pain in joint, lower leg 07/12/2010    Nuclear sclerotic cataract of both eyes 10/15/2018    Nuclear sclerosis 08/07/2018    Chronic atrial fibrillation (CMS/HCC) 10/15/2018    Right knee pain 10/26/2023    Left knee pain 10/26/2023    Osteoarthritis of right knee 10/26/2023     Resolved Ambulatory Problems     Diagnosis Date Noted    Abnormal laboratory test result 08/25/2023    Daytime sleepiness 08/25/2023     Past Medical History:   Diagnosis Date    Atrial fibrillation and flutter (CMS/HCC)     Bilateral knee pain     Colon cancer (CMS/HCC)     High cholesterol     Hypertension     Thyroid condition         Review of Systems   All other systems reviewed and are  negative.       Objective     There were no vitals filed for this visit.     Vitals and nursing note reviewed.   Constitutional:       Appearance: Healthy appearance.   HENT:    Mouth/Throat:      Pharynx: Oropharynx is clear.   Pulmonary:      Effort: Pulmonary effort is normal.      Breath sounds: Normal breath sounds.   Cardiovascular:      PMI at left midclavicular line. Normal rate. Regular rhythm. Normal S1. Normal S2.       Murmurs: There is a grade 1/6 holosystolic murmur.      No gallop.  No click. No rub.   Pulses:     Intact distal pulses.   Edema:     Peripheral edema absent.   Abdominal:      General: Bowel sounds are normal.   Musculoskeletal:      Cervical back: Normal range of motion. Skin:     General: Skin is warm and dry.   Neurological:      General: No focal deficit present.      Mental Status: Alert and oriented to person, place and time.            Lab Review:   No visits with results within 2 Month(s) from this visit.   Latest known visit with results is:   Legacy Encounter on 04/25/2023   Component Date Value    Vitamin B12 04/25/2023 1,182 (H)     Calcium 04/25/2023 10.0     AST 04/25/2023 14     Alkaline Phosphatase 04/25/2023 66     Total Bilirubin 04/25/2023 0.3     Total Protein 04/25/2023 6.8     Albumin 04/25/2023 4.2     Globulin, Total 04/25/2023 2.6     A/G Ratio 04/25/2023 1.6     Sodium 04/25/2023 139     Potassium 04/25/2023 4.0     Chloride 04/25/2023 99     Bicarbonate 04/25/2023 29     Anion Gap 04/25/2023 11     Urea Nitrogen 04/25/2023 22     Creatinine 04/25/2023 1.1     Urea Nitrogen/Creatinine* 04/25/2023 20.0     Glucose 04/25/2023 102 (H)     ALT (SGPT) 04/25/2023 7     ESTIMATED GFR 04/25/2023 51     Differential Type 04/25/2023 AUTO DIFF     Immature Granulocyte %, * 04/25/2023 0.20     Neutrophil 04/25/2023 65.70     Lymphocytes % 04/25/2023 25.50     Monocytes % 04/25/2023 7.30     Eosinophil 04/25/2023 1.00     Basophils % 04/25/2023 0.30     Immature Granulocytes  Ab* 04/25/2023 0.01     Neutrophils Absolute 04/25/2023 4.13     Lymphocytes Absolute 04/25/2023 1.60     Monocytes Absolute 04/25/2023 0.46     Eosinophils Absolute 04/25/2023 0.06     Basophils Absolute 04/25/2023 0.02     WBC 04/25/2023 6.3     RBC 04/25/2023 3.64 (L)     Hemoglobin 04/25/2023 11.7 (L)     Hematocrit 04/25/2023 36.0     MCV 04/25/2023 98.9     MCH 04/25/2023 32.1     MCHC 04/25/2023 32.5     RDW-SD 04/25/2023 48.3     RDW-CV 04/25/2023 13.2     Platelets 04/25/2023 269     MPV 04/25/2023 10.7     nRBC 04/25/2023 0     ABSOLUTE NEUTROPHIL CALC* 04/25/2023 4.13     Folate 04/25/2023 13.7     Iron 04/25/2023 76     TIBC 04/25/2023 323     Iron Saturation 04/25/2023 23.5     SERUM COLOR 04/25/2023 YELLOW     SERUM CLARITY 04/25/2023 CLEAR     Is the patient fasting? 04/25/2023 NO     Cholesterol 04/25/2023 132 (L)     Triglycerides 04/25/2023 142     HDL 04/25/2023 58     LDL Calculated 04/25/2023 46 (L)     Cholesterol/HDL Ratio 04/25/2023 2.3     Thyroid Stimulating Horm* 04/25/2023 3.46        ECG:  Normal sinus rhythm at 60 bpm MD interval 204 ms QRS duration 100 ms QTc 420 ms    Assessment/plan:  Continue current regimen.  The patient is to have no cardiac contraindications to planned and necessary procedures    Problem List Items Addressed This Visit    None

## 2024-01-19 ENCOUNTER — HOSPITAL ENCOUNTER (OUTPATIENT)
Dept: RADIOLOGY | Facility: CLINIC | Age: 79
Discharge: HOME | End: 2024-01-19
Payer: MEDICARE

## 2024-01-19 ENCOUNTER — OFFICE VISIT (OUTPATIENT)
Dept: ORTHOPEDIC SURGERY | Facility: CLINIC | Age: 79
End: 2024-01-19
Payer: MEDICARE

## 2024-01-19 VITALS — BODY MASS INDEX: 27.11 KG/M2 | WEIGHT: 162.92 LBS

## 2024-01-19 DIAGNOSIS — M25.569 KNEE PAIN: ICD-10-CM

## 2024-01-19 DIAGNOSIS — G89.29 CHRONIC PAIN OF RIGHT KNEE: Primary | ICD-10-CM

## 2024-01-19 DIAGNOSIS — M25.361 OTHER INSTABILITY, RIGHT KNEE: ICD-10-CM

## 2024-01-19 DIAGNOSIS — M25.561 CHRONIC PAIN OF RIGHT KNEE: Primary | ICD-10-CM

## 2024-01-19 DIAGNOSIS — M17.11 OSTEOARTHRITIS OF RIGHT KNEE, UNSPECIFIED OSTEOARTHRITIS TYPE: ICD-10-CM

## 2024-01-19 PROCEDURE — 1125F AMNT PAIN NOTED PAIN PRSNT: CPT | Performed by: ORTHOPAEDIC SURGERY

## 2024-01-19 PROCEDURE — 1159F MED LIST DOCD IN RCRD: CPT | Performed by: ORTHOPAEDIC SURGERY

## 2024-01-19 PROCEDURE — 1160F RVW MEDS BY RX/DR IN RCRD: CPT | Performed by: ORTHOPAEDIC SURGERY

## 2024-01-19 PROCEDURE — 1036F TOBACCO NON-USER: CPT | Performed by: ORTHOPAEDIC SURGERY

## 2024-01-19 PROCEDURE — 99214 OFFICE O/P EST MOD 30 MIN: CPT | Performed by: ORTHOPAEDIC SURGERY

## 2024-01-19 PROCEDURE — 73560 X-RAY EXAM OF KNEE 1 OR 2: CPT | Mod: BILATERAL PROCEDURE | Performed by: ORTHOPAEDIC SURGERY

## 2024-01-19 PROCEDURE — 73560 X-RAY EXAM OF KNEE 1 OR 2: CPT | Mod: 50

## 2024-01-19 RX ORDER — CEFAZOLIN SODIUM 2 G/100ML
2 INJECTION, SOLUTION INTRAVENOUS EVERY 8 HOURS
Status: CANCELLED | OUTPATIENT
Start: 2024-03-13

## 2024-01-19 RX ORDER — SODIUM CHLORIDE 9 MG/ML
100 INJECTION, SOLUTION INTRAVENOUS CONTINUOUS
Status: CANCELLED | OUTPATIENT
Start: 2024-03-13

## 2024-01-19 ASSESSMENT — ENCOUNTER SYMPTOMS
OCCASIONAL FEELINGS OF UNSTEADINESS: 0
DEPRESSION: 0
LOSS OF SENSATION IN FEET: 0

## 2024-01-19 ASSESSMENT — LIFESTYLE VARIABLES: TOTAL SCORE: 0

## 2024-01-19 ASSESSMENT — PAIN SCALES - GENERAL: PAINLEVEL_OUTOF10: 9

## 2024-01-19 ASSESSMENT — PAIN - FUNCTIONAL ASSESSMENT: PAIN_FUNCTIONAL_ASSESSMENT: 0-10

## 2024-01-19 ASSESSMENT — PAIN DESCRIPTION - DESCRIPTORS: DESCRIPTORS: ACHING

## 2024-01-19 NOTE — PROGRESS NOTES
Subjective    Patient ID: Jessy Mcdaniel is a 78 y.o. female.    History:    Subjective      Chief Complaint   Patient presents with    Left Knee - Pain    Right Knee - Pain        No surgery found     HPI  This   78 year young  woman presents today with  right knee pain (8/10). The patient states that this  right knee pain has been worsening and persistent for  years. The patient denies trauma or injury to the  right knee. The patient states that the  right knee pain is worse with and aggravated by  getting from a sitting to a standing position, attempting to go up or down stairs and also  walking and standing. The patient states that this  right knee pain impairs their ability to complete normal activities of daily living. She has tried cortisone injection with no relief of the right knee pain. The patient has tried Tylenol and ibuprofen with no relief.    JOINT REPLACEMENT HISTORY    Primary joint affected: right knee    Duration of symptoms: years    Joint replacement related history:  Osteoarthritis Severe  Avascular necrosis: No    Failed nonsurgical treatment:   NSAID/ COXIB: Yes  Weight loss: Yes  Physical therapy: No  Intra-articular injection: Yes  Braces, orthotics, or assistive devices: Yes    Radiological indications for replacement:   Subchondral cyst: Yes  Subchondral sclerosis: Yes  Periarticular osteophytes: Yes  Joint subluxation: Yes  Joint space narrowing: Yes    Highest level of walking support:   Cane, wheelchair or walker    Pain History:   Pain at rest: Severe  Pain when weigth bearing: Severe  Pain with passive ROM: Severe  Pain related ADL limitation: Severe  Abnormal findings on physical exam: Severe    Ability to walk without significant pain:  Household ambulator or less than 1 block    Is the patients current pain regimen controlling their joint pain? No     CARDIOLOGY:   Negative for chest pain, shortness of breath.   RESPIRATORY:   Negative for chest pain, shortness of breath.    MUSCULOSKELETAL:   See HPI for details.   NEUROLOGY:   Negative for tingling, numbness, weakness.    Objective    There were no vitals filed for this visit.    Physical Exam  GENERAL:          General Appearance:  This is a pleasant patient with appropriate affect, in no acute distress.   DERMATOLOGY:          Skin: skin at the neck, upper and lower back, and trunk is intact. There is no evidence of skin rash, skin breakdown or ulceration, or atrophic skin change.   EXTREMITIES:          Vascular:  Right, left hands and feet are warm with good color and pulses. Right and left calf and thigh are nontender and nonswollen.   NEUROLOGICAL:          Orientation:  Patient is alert and oriented to person, place, time and situation. Right and left upper and lower extremity motor and sensory examinations are intact.      MUSCULOSKELETAL: Neck: Nontender. No pain with range of motion.  back: No tenderness. Straight leg test negative bilaterally. Right and left hips: Nontender. No pain or limitation with ROM. Right and left knees:: There is diffuse tenderness over the knee. There is pain  with, crepitus with and limitation with ROM. McMurrays is negative. Anterior drawer and lachmans are negative. No effusion present. The knee is stable to valgus and varus stressing. The patient walks with a painful gait favoring the  right knee while walking.     x-rays of the right knee show done and read in the office today show severe osteoarthritis of the right knee with severe loss of joint surface cartilage.  I reviewed these x-rays with the patient in the office today  Jessy was seen today for pain and pain.  Diagnoses and all orders for this visit:  Chronic pain of right knee (Primary)  Osteoarthritis of right knee, unspecified osteoarthritis type  Other instability, right knee    Assessment/Plan   Encounter Diagnoses:  Chronic pain of right knee    Osteoarthritis of right knee, unspecified osteoarthritis type    Other instability,  right knee    No orders of the defined types were placed in this encounter.   options are discussed with the patient in detail. The patient is instructed regarding ice, activity modification and risk for further injury with falling or trauma, and the use of a cane at all times for balance and support and continuing with physician directed at home gentle strengthening and range of motion exercises, and the appropriate use of Tylenol as needed for pain with its potential adverse reactions and side effects. The patient understands. The patient states that this right knee pain is debilitating. The patient states that since this right knee pain has recurred that it is disabling and impairs the patient´s ability to walk and do other activities that are important. AP and lateral x-rays show osteoarthritis of the right knee that is worse at the medial compartment with loss of joint surface cartilage, bone on bone and sclerosis. Right total knee replacement with indications, alternatives, potential risks including but not limited to blood loss, blood clot, nerve damage, infection, death and stroke, benefits, unforseen risks, the rehab involved and the fact that no guarantee can be made were all discussed with the patient in detail. The patient understands, accepts and wishes to proceed because of the persistent right knee pain and the fact that activity modification, gentle strengthening and ROM exercises, physical therapy, Tylenol, ibuprofen, cortisone injections did not relieve the right knee pain and because this right knee pain impairs the patient´s ability to complete the normal activities of daily living . I agree. We will be setting this up at a time that is convenient for the patient and as the schedule allows. The patient is to follow up as needed. Please note that this report has been produced using speech recognition software.  It may contain errors related to grammar, punctuation or spelling.  Electronically  signed, but not reviewed.Return as needed, Please note that this report has been produced using speech recognition software.  It may contain errors related to grammar, punctuation or spelling.  Electronically signed, but not reviewed.   Patient ID: Jessy Mcdaniel is a 78 y.o. female.      No follow-ups on file.

## 2024-02-02 ENCOUNTER — TELEPHONE (OUTPATIENT)
Dept: PRIMARY CARE | Facility: CLINIC | Age: 79
End: 2024-02-02
Payer: MEDICARE

## 2024-02-02 DIAGNOSIS — E78.2 MIXED HYPERLIPIDEMIA: Primary | ICD-10-CM

## 2024-02-02 DIAGNOSIS — N95.2 ATROPHIC VAGINITIS: ICD-10-CM

## 2024-02-02 RX ORDER — ESTRADIOL 10 UG/1
10 INSERT VAGINAL 2 TIMES WEEKLY
Qty: 30 TABLET | Refills: 3 | Status: SHIPPED | OUTPATIENT
Start: 2024-02-05 | End: 2024-03-17 | Stop reason: HOSPADM

## 2024-02-02 RX ORDER — SIMVASTATIN 20 MG/1
20 TABLET, FILM COATED ORAL DAILY
Qty: 90 TABLET | Refills: 3 | Status: SHIPPED | OUTPATIENT
Start: 2024-02-02 | End: 2024-03-17 | Stop reason: HOSPADM

## 2024-02-02 NOTE — TELEPHONE ENCOUNTER
Pt came into the office after hours to request a refill for Simvastatin 20mg once daily and Estradiol Vaginal Inserts 10mcg 2x weekly to [facility] in [place].

## 2024-02-13 ENCOUNTER — OFFICE VISIT (OUTPATIENT)
Dept: PRIMARY CARE | Facility: CLINIC | Age: 79
End: 2024-02-13
Payer: MEDICARE

## 2024-02-13 VITALS
DIASTOLIC BLOOD PRESSURE: 60 MMHG | HEART RATE: 70 BPM | OXYGEN SATURATION: 99 % | WEIGHT: 164 LBS | BODY MASS INDEX: 27.29 KG/M2 | SYSTOLIC BLOOD PRESSURE: 122 MMHG

## 2024-02-13 DIAGNOSIS — I10 ESSENTIAL HYPERTENSION: Primary | ICD-10-CM

## 2024-02-13 DIAGNOSIS — G89.29 CHRONIC PAIN OF LEFT KNEE: ICD-10-CM

## 2024-02-13 DIAGNOSIS — E03.9 ACQUIRED HYPOTHYROIDISM: ICD-10-CM

## 2024-02-13 DIAGNOSIS — M81.0 AGE-RELATED OSTEOPOROSIS WITHOUT CURRENT PATHOLOGICAL FRACTURE: ICD-10-CM

## 2024-02-13 DIAGNOSIS — N95.2 POSTMENOPAUSAL ATROPHIC VAGINITIS: ICD-10-CM

## 2024-02-13 DIAGNOSIS — M25.562 CHRONIC PAIN OF LEFT KNEE: ICD-10-CM

## 2024-02-13 DIAGNOSIS — I48.20 CHRONIC ATRIAL FIBRILLATION (MULTI): ICD-10-CM

## 2024-02-13 DIAGNOSIS — G47.33 OSA (OBSTRUCTIVE SLEEP APNEA): ICD-10-CM

## 2024-02-13 DIAGNOSIS — E78.2 MIXED HYPERLIPIDEMIA: ICD-10-CM

## 2024-02-13 DIAGNOSIS — N39.0 ACUTE UTI: ICD-10-CM

## 2024-02-13 LAB
POC APPEARANCE, URINE: ABNORMAL
POC BILIRUBIN, URINE: NEGATIVE
POC BLOOD, URINE: ABNORMAL
POC COLOR, URINE: YELLOW
POC GLUCOSE, URINE: NEGATIVE MG/DL
POC KETONES, URINE: NEGATIVE MG/DL
POC LEUKOCYTES, URINE: ABNORMAL
POC NITRITE,URINE: NEGATIVE
POC PH, URINE: 7 PH
POC PROTEIN, URINE: NEGATIVE MG/DL
POC SPECIFIC GRAVITY, URINE: 1.02
POC UROBILINOGEN, URINE: 0.2 EU/DL

## 2024-02-13 PROCEDURE — 1160F RVW MEDS BY RX/DR IN RCRD: CPT | Performed by: INTERNAL MEDICINE

## 2024-02-13 PROCEDURE — 3078F DIAST BP <80 MM HG: CPT | Performed by: INTERNAL MEDICINE

## 2024-02-13 PROCEDURE — 99214 OFFICE O/P EST MOD 30 MIN: CPT | Performed by: INTERNAL MEDICINE

## 2024-02-13 PROCEDURE — 3074F SYST BP LT 130 MM HG: CPT | Performed by: INTERNAL MEDICINE

## 2024-02-13 PROCEDURE — 1125F AMNT PAIN NOTED PAIN PRSNT: CPT | Performed by: INTERNAL MEDICINE

## 2024-02-13 PROCEDURE — 1159F MED LIST DOCD IN RCRD: CPT | Performed by: INTERNAL MEDICINE

## 2024-02-13 PROCEDURE — 87086 URINE CULTURE/COLONY COUNT: CPT | Mod: WESLAB | Performed by: INTERNAL MEDICINE

## 2024-02-13 PROCEDURE — 81002 URINALYSIS NONAUTO W/O SCOPE: CPT | Performed by: INTERNAL MEDICINE

## 2024-02-13 PROCEDURE — 1036F TOBACCO NON-USER: CPT | Performed by: INTERNAL MEDICINE

## 2024-02-13 ASSESSMENT — ENCOUNTER SYMPTOMS
OCCASIONAL FEELINGS OF UNSTEADINESS: 0
CONSTITUTIONAL NEGATIVE: 1
DYSURIA: 1
CARDIOVASCULAR NEGATIVE: 1
ABDOMINAL PAIN: 0
DEPRESSION: 0
FREQUENCY: 1
MYALGIAS: 1
DIARRHEA: 0
CONSTIPATION: 0
PSYCHIATRIC NEGATIVE: 1
ARTHRALGIAS: 1
LOSS OF SENSATION IN FEET: 0
ACTIVITY CHANGE: 0
SHORTNESS OF BREATH: 0
COUGH: 0

## 2024-02-13 ASSESSMENT — PATIENT HEALTH QUESTIONNAIRE - PHQ9
1. LITTLE INTEREST OR PLEASURE IN DOING THINGS: NOT AT ALL
SUM OF ALL RESPONSES TO PHQ9 QUESTIONS 1 AND 2: 0
2. FEELING DOWN, DEPRESSED OR HOPELESS: NOT AT ALL

## 2024-02-13 ASSESSMENT — PAIN SCALES - GENERAL: PAINLEVEL: 5

## 2024-02-13 NOTE — PROGRESS NOTES
Subjective   Patient ID: Jessy Mcdaniel is a 78 y.o. female who presents for 4 MONTH FOLLOW UP.    Hypertension-compliant and stable on current medications  BP Readings from Last 3 Encounters:  02/13/24 : 122/60  12/21/23 : 128/62  10/11/23 : 120/70       Atrial Fibrillation--stable on meds-remains on eliquis. Managed by Dr Aguila    Hyperlipidemia-stable and compliant on meds. Taking simvastatin. Coronary calcium score 0 in 2022. Lab Results       Component                Value               Date                       LDLCALC                  46 (L)              04/25/2023               Hypothyroid-stable on meds- Lab Results       Component                Value               Date                       TSH                      3.46                04/25/2023               Sleep apnea--uses CPAP nightly with benefit    Exercise-limited by knee        Diet -eats healthy    Scheduled for knee replacement-cleared by Dr Aguila         Review of Systems   Constitutional: Negative.  Negative for activity change.   Respiratory:  Negative for cough and shortness of breath.    Cardiovascular: Negative.    Gastrointestinal:  Negative for abdominal pain, constipation and diarrhea.   Genitourinary:  Positive for dysuria (on and off, no burning) and frequency.   Musculoskeletal:  Positive for arthralgias and myalgias.        R knee pain   Skin:  Negative for rash.   Psychiatric/Behavioral: Negative.          Able to see great-granddtr       Objective   /60 (BP Location: Left arm, Patient Position: Sitting)   Pulse 70   Wt 74.4 kg (164 lb)   LMP  (LMP Unknown)   SpO2 99%   BMI 27.29 kg/m²     Physical Exam  Constitutional:       General: She is not in acute distress.     Appearance: Normal appearance.   Cardiovascular:      Rate and Rhythm: Normal rate and regular rhythm.      Heart sounds: Normal heart sounds. No murmur heard.     No gallop.   Pulmonary:      Breath sounds: Normal breath sounds. No wheezing,  rhonchi or rales.   Musculoskeletal:         General: Swelling and tenderness present.      Comments: Unable to fully straighten R knee w/o clear effusion   Skin:     General: Skin is warm and dry.      Findings: No rash.   Neurological:      General: No focal deficit present.      Mental Status: She is alert and oriented to person, place, and time.   Psychiatric:         Mood and Affect: Mood normal.         Assessment/Plan  will continue regimen, check urine culture, cleared for surgery  Diagnoses and all orders for this visit:  Essential hypertension  Chronic atrial fibrillation (CMS/HCC)  Mixed hyperlipidemia  Acquired hypothyroidism  Age-related osteoporosis without current pathological fracture  Postmenopausal atrophic vaginitis  SHANDA (obstructive sleep apnea)  Chronic pain of left knee  Comments:  surgery as per Dr Guerra  Acute UTI  -     POCT UA (nonautomated) manually resulted  -     Urine Culture  Other orders  -     Follow Up In Primary Care - Established; Future

## 2024-02-15 RX ORDER — SULFAMETHOXAZOLE AND TRIMETHOPRIM 800; 160 MG/1; MG/1
1 TABLET ORAL 2 TIMES DAILY
Qty: 14 TABLET | Refills: 0 | Status: SHIPPED | OUTPATIENT
Start: 2024-02-15 | End: 2024-02-22

## 2024-02-16 LAB — BACTERIA UR CULT: ABNORMAL

## 2024-02-18 RX ORDER — DOXYCYCLINE 100 MG/1
100 CAPSULE ORAL 2 TIMES DAILY
Qty: 14 CAPSULE | Refills: 0 | Status: SHIPPED | OUTPATIENT
Start: 2024-02-18 | End: 2024-02-25

## 2024-02-26 ENCOUNTER — APPOINTMENT (OUTPATIENT)
Dept: PREADMISSION TESTING | Facility: HOSPITAL | Age: 79
End: 2024-02-26
Payer: MEDICARE

## 2024-02-26 ENCOUNTER — APPOINTMENT (OUTPATIENT)
Dept: ORTHOPEDIC SURGERY | Facility: CLINIC | Age: 79
End: 2024-02-26
Payer: MEDICARE

## 2024-02-28 ENCOUNTER — PRE-ADMISSION TESTING (OUTPATIENT)
Dept: PREADMISSION TESTING | Facility: HOSPITAL | Age: 79
End: 2024-02-28
Payer: MEDICARE

## 2024-02-28 ENCOUNTER — LAB (OUTPATIENT)
Dept: LAB | Facility: LAB | Age: 79
End: 2024-02-28
Payer: MEDICARE

## 2024-02-28 ENCOUNTER — APPOINTMENT (OUTPATIENT)
Dept: PREADMISSION TESTING | Facility: HOSPITAL | Age: 79
End: 2024-02-28
Payer: MEDICARE

## 2024-02-28 VITALS
SYSTOLIC BLOOD PRESSURE: 105 MMHG | DIASTOLIC BLOOD PRESSURE: 87 MMHG | HEIGHT: 65 IN | OXYGEN SATURATION: 100 % | WEIGHT: 164.46 LBS | HEART RATE: 75 BPM | BODY MASS INDEX: 27.4 KG/M2 | TEMPERATURE: 98.6 F

## 2024-02-28 DIAGNOSIS — M25.561 RIGHT KNEE PAIN, UNSPECIFIED CHRONICITY: ICD-10-CM

## 2024-02-28 DIAGNOSIS — R79.9 ABNORMAL FINDING OF BLOOD CHEMISTRY, UNSPECIFIED: ICD-10-CM

## 2024-02-28 DIAGNOSIS — E03.9 HYPOTHYROIDISM, UNSPECIFIED TYPE: ICD-10-CM

## 2024-02-28 DIAGNOSIS — E03.9 HYPOTHYROIDISM, UNSPECIFIED TYPE: Primary | ICD-10-CM

## 2024-02-28 DIAGNOSIS — M17.11 OSTEOARTHRITIS OF RIGHT KNEE, UNSPECIFIED OSTEOARTHRITIS TYPE: ICD-10-CM

## 2024-02-28 LAB
APPEARANCE UR: CLEAR
BILIRUB UR STRIP.AUTO-MCNC: NEGATIVE MG/DL
COLOR UR: COLORLESS
ERYTHROCYTE [DISTWIDTH] IN BLOOD BY AUTOMATED COUNT: 13.5 % (ref 11.5–14.5)
EST. AVERAGE GLUCOSE BLD GHB EST-MCNC: 120 MG/DL
GLUCOSE UR STRIP.AUTO-MCNC: NORMAL MG/DL
HBA1C MFR BLD: 5.8 %
HCT VFR BLD AUTO: 37.3 % (ref 36–46)
HGB BLD-MCNC: 11.9 G/DL (ref 12–16)
HOLD SPECIMEN: NORMAL
KETONES UR STRIP.AUTO-MCNC: NEGATIVE MG/DL
LEUKOCYTE ESTERASE UR QL STRIP.AUTO: NEGATIVE
MCH RBC QN AUTO: 30.9 PG (ref 26–34)
MCHC RBC AUTO-ENTMCNC: 31.9 G/DL (ref 32–36)
MCV RBC AUTO: 97 FL (ref 80–100)
NITRITE UR QL STRIP.AUTO: NEGATIVE
NRBC BLD-RTO: 0 /100 WBCS (ref 0–0)
PH UR STRIP.AUTO: 6 [PH]
PLATELET # BLD AUTO: 283 X10*3/UL (ref 150–450)
PROT UR STRIP.AUTO-MCNC: NEGATIVE MG/DL
RBC # BLD AUTO: 3.85 X10*6/UL (ref 4–5.2)
RBC # UR STRIP.AUTO: NEGATIVE /UL
SP GR UR STRIP.AUTO: 1.01
TSH SERPL DL<=0.05 MIU/L-ACNC: 3.33 MIU/L (ref 0.27–4.2)
UROBILINOGEN UR STRIP.AUTO-MCNC: NORMAL MG/DL
WBC # BLD AUTO: 5.1 X10*3/UL (ref 4.4–11.3)

## 2024-02-28 PROCEDURE — 85027 COMPLETE CBC AUTOMATED: CPT

## 2024-02-28 PROCEDURE — 81003 URINALYSIS AUTO W/O SCOPE: CPT

## 2024-02-28 PROCEDURE — 99204 OFFICE O/P NEW MOD 45 MIN: CPT | Performed by: PHYSICIAN ASSISTANT

## 2024-02-28 PROCEDURE — 83036 HEMOGLOBIN GLYCOSYLATED A1C: CPT

## 2024-02-28 PROCEDURE — 87081 CULTURE SCREEN ONLY: CPT | Mod: WESLAB | Performed by: PHYSICIAN ASSISTANT

## 2024-02-28 PROCEDURE — 36415 COLL VENOUS BLD VENIPUNCTURE: CPT

## 2024-02-28 PROCEDURE — 84443 ASSAY THYROID STIM HORMONE: CPT

## 2024-02-28 RX ORDER — CHLORHEXIDINE GLUCONATE ORAL RINSE 1.2 MG/ML
15 SOLUTION DENTAL AS NEEDED
Qty: 120 ML | Refills: 0 | Status: SHIPPED | OUTPATIENT
Start: 2024-03-12 | End: 2024-03-17 | Stop reason: HOSPADM

## 2024-02-28 ASSESSMENT — DUKE ACTIVITY SCORE INDEX (DASI)
CAN YOU HAVE SEXUAL RELATIONS: YES
CAN YOU WALK A BLOCK OR TWO ON LEVEL GROUND: YES
CAN YOU PARTICIPATE IN STRENOUS SPORTS LIKE SWIMMING, SINGLES TENNIS, FOOTBALL, BASKETBALL, OR SKIING: NO
DASI METS SCORE: 6.7
CAN YOU WALK INDOORS, SUCH AS AROUND YOUR HOUSE: YES
CAN YOU TAKE CARE OF YOURSELF (EAT, DRESS, BATHE, OR USE TOILET): YES
CAN YOU DO MODERATE WORK AROUND THE HOUSE LIKE VACUUMING, SWEEPING FLOORS OR CARRYING GROCERIES: YES
TOTAL_SCORE: 32.2
CAN YOU DO HEAVY WORK AROUND THE HOUSE LIKE SCRUBBING FLOORS OR LIFTING AND MOVING HEAVY FURNITURE: YES
CAN YOU DO YARD WORK LIKE RAKING LEAVES, WEEDING OR PUSHING A MOWER: NO
CAN YOU DO LIGHT WORK AROUND THE HOUSE LIKE DUSTING OR WASHING DISHES: YES
CAN YOU RUN A SHORT DISTANCE: NO
CAN YOU PARTICIPATE IN MODERATE RECREATIONAL ACTIVITIES LIKE GOLF, BOWLING, DANCING, DOUBLES TENNIS OR THROWING A BASEBALL OR FOOTBALL: NO
CAN YOU CLIMB A FLIGHT OF STAIRS OR WALK UP A HILL: YES

## 2024-02-28 ASSESSMENT — CHADS2 SCORE
CHADS2 SCORE: 2
HYPERTENSION: YES
PRIOR STROKE OR TIA OR THROMBOEMBOLISM: NO
CHF: NO
DIABETES: NO
AGE GREATER THAN OR EQUAL TO 75: YES

## 2024-02-28 ASSESSMENT — PAIN - FUNCTIONAL ASSESSMENT: PAIN_FUNCTIONAL_ASSESSMENT: 0-10

## 2024-02-28 ASSESSMENT — ENCOUNTER SYMPTOMS: ARTHRALGIAS: 1

## 2024-02-28 ASSESSMENT — PAIN DESCRIPTION - DESCRIPTORS: DESCRIPTORS: ACHING

## 2024-02-28 ASSESSMENT — LIFESTYLE VARIABLES: SMOKING_STATUS: NONSMOKER

## 2024-02-28 ASSESSMENT — PAIN SCALES - GENERAL: PAINLEVEL_OUTOF10: 7

## 2024-02-28 NOTE — CPM/PAT H&P
CPM/PAT Evaluation       Name: Jessy Mcdaniel (Jessy Mcdaniel)  /Age: 1945/78 y.o.     In-Person       Chief Complaint: Bilateral knee pain    HPI 78-year-old female complains of bilateral knee pain.  Patient states pain is worse on the right.  Patient states she has given up walking secondary to pain.  She is unable to walk for long periods.  She has difficulty when getting up from a seated position.  She states her balance is off.  Patient states she does not use a walker or cane.  Patient has history of paroxysmal atrial fibrillation/flutter, hypertension, hypothyroidism, osteoporosis, sleep apnea and remote history of colon cancer.  Patient denies fevers or chills and she has not had a cold within the past month or so.  Patient is scheduled for right total knee replacement 2024    Past Medical History:   Diagnosis Date    Atrial fibrillation and flutter (CMS/HCC)     Bilateral knee pain     Colon cancer (CMS/HCC)     High cholesterol     Hypertension     Hypothyroidism     Osteoarthritis     Osteoporosis     Premature atrial contraction     Sleep apnea     CPAP compliant       Past Surgical History:   Procedure Laterality Date    APPENDECTOMY      BI STEREOTACTIC GUIDED BREAST RIGHT LOCALIZATION AND BIOPSY Right 2014    BI STEREOTACTIC GUIDED BREAST LOCALIZATION AND BIOPSY RIGHT [facility]    BI STEREOTACTIC GUIDED BREAST RIGHT LOCALIZATION AND BIOPSY Right 2017    BI STEREOTACTIC GUIDED BREAST LOCALIZATION AND BIOPSY RIGHT [facility]    BI US GUIDED BREAST LOCALIZATION AND BIOPSY LEFT Left 2022    BI US GUIDED BREAST LOCALIZATION AND BIOPSY LEFT [facility]    CATARACT EXTRACTION W/ INTRAOCULAR LENS  IMPLANT, BILATERAL      COLON SURGERY      COLONOSCOPY      HYSTERECTOMY      KNEE ARTHROSCOPY W/ MENISCECTOMY Left     And Baker's cyst removal    WISDOM TOOTH EXTRACTION         Patient Sexual activity questions deferred to the physician.    Family  History   Problem Relation Name Age of Onset    Coronary artery disease Mother      Heart disease Mother      Stroke Father         Allergies   Allergen Reactions    Prochlorperazine Edisylate Hallucinations    Prochlorperazine Anxiety       Current Outpatient Medications   Medication Instructions    calcium carbonate/vitamin D3 (CALCIUM 600 + D,3, ORAL) 1 tablet, oral, Daily    [START ON 3/12/2024] chlorhexidine (Peridex) 0.12 % solution 15 mL, Mouth/Throat, As needed, Use cap to measure 15 mL.  Swish/gargle mouthwash for at least 30 seconds.  Do not swallow.  Use night before surgery after brushing teeth and morning of surgery after brushing teeth.    cholecalciferol (VITAMIN D-3) 2,000 Units, oral, Daily    denosumab (Prolia) 60 mg/mL syringe subcutaneous, As directed    docosahexaenoic acid/epa (FISH OIL ORAL) 1 capsule, oral, Daily    Eliquis 5 mg, oral, 2 times daily    estradiol (VAGIFEM) 10 mcg, vaginal, 2 times weekly    flecainide (TAMBOCOR) 50 mg, oral, 2 times daily    furosemide (Lasix) 20 mg tablet 1 tablet, oral, Daily, Only for swelling    simvastatin (ZOCOR) 20 mg, oral, Daily    Synthroid 50 mcg, oral, Daily, 1 tab alt with 1/2 daily    triamterene-hydrochlorothiazid (Maxzide-25) 37.5-25 mg tablet 0.5 tablets, oral, Every morning     Review of Systems   Eyes:         Patient wears glasses   Musculoskeletal:  Positive for arthralgias and gait problem.   All other systems reviewed and are negative.       Physical Exam  Vitals reviewed. Physical exam within normal limits.   Constitutional:       Appearance: Normal appearance.   HENT:      Head: Normocephalic and atraumatic.      Mouth/Throat:      Mouth: Mucous membranes are moist.   Eyes:      Extraocular Movements: Extraocular movements intact.      Pupils: Pupils are equal, round, and reactive to light.   Neck:      Vascular: No carotid bruit.   Cardiovascular:      Rate and Rhythm: Normal rate and regular rhythm.      Pulses: Normal pulses.       "Heart sounds: Normal heart sounds.   Pulmonary:      Effort: Pulmonary effort is normal.      Breath sounds: Normal breath sounds.   Abdominal:      General: Bowel sounds are normal.      Palpations: Abdomen is soft.   Musculoskeletal:      Cervical back: Normal range of motion.      Right lower leg: Edema present.      Comments: Varicosities are present   Lymphadenopathy:      Cervical: No cervical adenopathy.   Skin:     General: Skin is warm and dry.   Neurological:      General: No focal deficit present.      Mental Status: She is alert and oriented to person, place, and time.      Gait: Gait abnormal.   Psychiatric:         Mood and Affect: Mood normal.         Behavior: Behavior normal.         Thought Content: Thought content normal.         Judgment: Judgment normal.          PAT AIRWAY:   Airway:     Mallampati::  I    Neck ROM::  Full      Vitals  Heart Rate:  [75]   Temp:  [37 °C (98.6 °F)]   BP: (105)/(87)   Height:  [165.1 cm (5' 5\")]   Weight:  [74.6 kg (164 lb 7.4 oz)]   SpO2:  [100 %]        DASI Risk Score      Flowsheet Row Most Recent Value   DASI SCORE 32.2   METS Score (Will be calculated only when all the questions are answered) 6.7          Caprini DVT Assessment      Flowsheet Row Most Recent Value   DVT Score 17   Current Status Swollen legs, Elective major lower extremity arthroplasty, Varicose veins   History Prior major surgery, Previous malignancy, SVT, DVT/PE   Age Over 75 years   BMI 30 or less          Modified Frailty Index    No data to display       CHADS2 Stroke Risk  Current as of 3 minutes ago        4% 3 - 100%: High Risk   2 - 3%: Medium Risk   0 - 2%: Low Risk     No Change          This score determines the patient's risk of having a stroke if the patient has atrial fibrillation.          Points Metrics   0 Has Congestive Heart Failure:  No     Patients with congestive heart failure get 1 point.    Current as of 3 minutes ago   1 Has Hypertension:  Yes     Patients with " hypertension get 1 point.    Current as of 3 minutes ago   1 Age:  78     Patients who are 75 years of age or older get 1 point.    Current as of 3 minutes ago   0 Has Diabetes:  No     Patients with diabetes get 1 point.    Current as of 3 minutes ago   0 Had Stroke:  No  Had TIA:  No  Had Thromboembolism:  No     Patients who have had a stroke, TIA, or thromboembolism get 2 points.    Current as of 3 minutes ago             Revised Cardiac Risk Index      Flowsheet Row Most Recent Value   Revised Cardiac Risk Calculator 0          Apfel Simplified Score      Flowsheet Row Most Recent Value   Apfel Simplified Score Calculator 2          Risk Analysis Index Results This Encounter    No data found in the last 1 encounters.       Stop Bang Score      Flowsheet Row Most Recent Value   Do you snore loudly? 0   Do you often feel tired or fatigued after your sleep? 0   Has anyone ever observed you stop breathing in your sleep? 0   Do you have or are you being treated for high blood pressure? 1   Recent BMI (Calculated) 27.4   Is BMI greater than 35 kg/m2? 0=No   Age older than 50 years old? 1=Yes   Is your neck circumference greater than 17 inches (Male) or 16 inches (Female)? 0   Gender - Male 0=No   STOP-BANG Total Score 2            Assessment and Plan:   1.  Right knee osteoarthritis   Plan: Right total knee replacement 3/13/2024  2.  Paroxysmal atrial fibrillation/flutter-controlled with medications              To hold Eliquis for 3 days before procedure  3.  Hypothyroidism stable.  We will check levels today  4.  Hypertension-stable  5.  Sleep apnea-CPAP compliant  6.  ASA 2       Caprini DVT score 17       Stop bang score 2       CHADS2 score 2       DASI score 32.21       METS score 6.7       RCRI score 0          Apfel score 2  7.  CBC, A1c, UA, MRSA and TSH ordered today  8.  EKG and cardiac clearance Dr. Aguila 12/21/2023

## 2024-02-28 NOTE — PREPROCEDURE INSTRUCTIONS
Medication List            Accurate as of February 28, 2024  9:33 AM. Always use your most recent med list.                CALCIUM 600 + D(3) ORAL  Medication Adjustments for Surgery: Stop 7 days before surgery     cholecalciferol 50 MCG (2000 UT) tablet  Commonly known as: Vitamin D-3  Medication Adjustments for Surgery: Stop 7 days before surgery     Eliquis 5 mg tablet  Generic drug: apixaban  Take 1 tablet (5 mg) by mouth 2 times a day.  Notes to patient: LAST DOSE 3/9/2024 11PM PER INSTRUCTIONS DR HATFIELD     estradiol 10 mcg tablet vaginal tablet  Commonly known as: Vagifem  Insert 1 tablet (10 mcg) into the vagina 2 times a week.     FISH OIL ORAL  Medication Adjustments for Surgery: Stop 7 days before surgery     flecainide 50 mg tablet  Commonly known as: Tambocor  Take 1 tablet (50 mg) by mouth 2 times a day.  Medication Adjustments for Surgery: Take morning of surgery with sip of water, no other fluids     furosemide 20 mg tablet  Commonly known as: Lasix  Notes to patient: DO NOT TAKE MORNING OF SURGERY     Prolia 60 mg/mL syringe  Generic drug: denosumab     simvastatin 20 mg tablet  Commonly known as: Zocor  Take 1 tablet (20 mg) by mouth once daily.  Notes to patient: TAKE EVENING DOSE DAY BEFORE SURGERY     Synthroid 50 mcg tablet  Generic drug: levothyroxine  Take 1 tablet (50 mcg) by mouth once daily. 1 tab alt with 1/2 daily  Medication Adjustments for Surgery: Take morning of surgery with sip of water, no other fluids     triamterene-hydrochlorothiazid 37.5-25 mg tablet  Commonly known as: Maxzide-25  Take 0.5 tablets by mouth once daily in the morning.  Medication Adjustments for Surgery: Take morning of surgery with sip of water, no other fluids                              NPO Instructions:    Do not eat any food after midnight the night before your surgery/procedure.    Additional Instructions:     Five Days before Surgery:  Review your medication instructions, stop indicated  medications  Begin using your Hibiclens    PAT DISCHARGE INSTRUCTIONS    Please call the Same Day Surgery (SDS) Department of the hospital where your procedure will be performed after 2:00 PM the day before your surgery. If you are scheduled on a Monday, or a Tuesday following a Monday holiday, you will need to call on the last business day prior to your surgery.    [facility]  [address] [address], [id number] [phone]    [facility]  [address] [place] [address] [phone]    [facility]  [address].  [place] [id number]  [phone]    Please let your surgeon know if:      You develop any open sores, shingles, burning or painful urination as these may increase your risk of an infection.   You no longer wish to have the surgery.   Any other personal circumstances change that may lead to the need to cancel or defer this surgery-such as being sick or getting admitted to any hospital within one week of your planned procedure.    Your contact details change, such as a change of address or phone number.    Starting now:     Please DO NOT drink alcohol or smoke for 24 hours before surgery. It is well known that quitting smoking can make a huge difference to your health and recovery from surgery. The longer you abstain from smoking, the better your chances of a healthy recovery. If you need help with quitting, call 1-800-QUIT-NOW to be connected to a trained counselor who will discuss the best methods to help you quit.     Before your surgery:    Please stop all supplements 7 days prior to surgery. Or as directed by your surgeon.   Please stop taking NSAID pain medicine such as Advil and Motrin 7 days before surgery.    If you develop any fever, cough, cold, rashes, cuts, scratches, scrapes, urinary symptoms or infection anywhere on your body (including teeth and gums) prior to surgery,  please call your surgeon’s office as soon as possible. This may require treatment to reduce the chance of cancellation on the day of surgery.    The day before your surgery:   DIET- Do not eat or drink anything after midnight the night before surgery, including mints, candy and gum.   Get a good night’s rest.  Use the special soap for bathing if you have been instructed to use one.    Scheduled surgery times may change and you will be notified if this occurs - please check your personal voicemail for any updates.     On the morning of surgery:   Wear comfortable, loose fitting clothes which open in the front. Please do not wear moisturizers, creams, lotions, makeup or perfume.    Please bring with you to surgery:   Photo ID and insurance card   Current list of medicines and allergies   Pacemaker/ Defibrillator/Heart stent cards   CPAP machine and mask    Slings/ splints/ crutches   A copy of your complete advanced directive/DHPOA.    Please do NOT bring with you to surgery:   All jewelry and valuables should be left at home.   Prosthetic devices such as contact lenses, hearing aids, dentures, eyelash extensions, hairpins and body piercings must be removed prior to going in to the surgical suite.    After outpatient surgery:   A responsible adult MUST accompany you at the time of discharge and stay with you for 24 hours after your surgery. You may NOT drive yourself home after surgery.    Do not drive, operate machinery, make critical decisions or do activities that require co-ordination or balance until after a night’s sleep.   Do not drink alcoholic beverages for 24 hours.   Instructions for resuming your medications will be provided by your surgeon.    CALL YOUR DOCTOR AFTER SURGERY IF YOU HAVE:     Chills and/or a fever of 101° F or higher.    Redness, swelling, pus or drainage from your surgical wound or a bad smell from the wound.    Lightheadedness, fainting or confusion.    Persistent vomiting (throwing up)  and are not able to eat or drink for 12 hours.    Three or more loose, watery bowel movements in 24 hours (diarrhea).   Difficulty or pain while urinating( after non-urological surgery)    Pain and swelling in your legs, especially if it is only on one side.    Difficulty breathing or are breathing faster than normal.    Any new concerning symptoms.     Home Preoperative Antibacterial Shower    What is a home preoperative antibacterial shower?  This shower is a way of cleaning the skin with a germ killing solution before surgery. The solution contains chlorhexidine, commonly known as CHG. CHG is a skin cleanser with germ killing ability. Let your doctor know if you are allergic to chlorhexidine.      Why do I need to take a preoperative antibacterial shower?  Skin is not sterile. It is best to try to make your skin as free of germs as possible before surgery. Proper cleansing with a germ killing soap before surgery can lower the number of germs on your skin. This helps to reduce the risk of infection at the surgical site. Following the instructions listed below will help you prepare your skin for surgery.    How do I use the solution?      Steps: Begin using your CHG soap FIVE DAYS BEFORE your scheduled surgery on _MARCH 13, 2024 START USING SOAP ON MARCH 9,2024_________________________________________________.  First, wash and rinse your hair using the CHG soap. Keep CHG away soap away from ear canals and eyes.  Rinse completely, do not condition. Hair extensions should be removed.  Wash your face with your normal soap and rinse.  Apply the CHG solution to a clean wet washcloth. Turn the water off or move away from the water spray to avoid premature rinsing of the CHG soap as you are applying.  Firmly lather your entire body from neck down. Do not use on face.  Pay special attention to the areas(s) where your incision(s) will be located unless they are on your face.  Avoid scrubbing your skin too hard.  The  important point is to have the CHG soap sit on your skin for 3 minutes.  DO NOT wash with regular soap after you have used the CHG soap solution.  Pat yourself dry with a clean, freshly laundered towel.  DO NOT apply powders, deodorants or lotions.  Dress in clean, freshly laundered night clothes.  Be sure to sleep with clean, freshly laundered sheets.  Be aware that CHG will cause stains on fabrics; if you wash them with bleach after use. Rinse your washcloth and other linens that have contact with CHG completely. Use only non-chlorine detergents to launder the items used.  The morning of surgery is the fifth day. Repeat the above steps and dress in clean comfortable clothing.      Who should I call if I have any questions regarding the use of CHG soap?  Call the [facility] at [phone] if you have any questions.     Patient Information: Oral/Dental Rinse    What is oral/dental rinse?  It is a mouthwash. It is a way of cleaning the mouth with a germ killing solution before your surgery. The solution contains chlorhexidine, commonly know as CHG.  It is used inside the mouth to kill bacteria known as Staphylococcus aureus.  Let your doctor know if you are allergic to chlorhexidine.    Why do I need to use CHG oral/dental rinse?  The CHG oral/dental rinse helps to kill bacteria in your mouth known as Staphylococcus aureus. This reduces the risk of infection at the surgical site.    Using your CHG oral/dental rinse.  STEPS: use your CHG oral/dental rinse after you brush your teeth the night before (at bedtime) and the morning of your surgery. Follow the directions on your prescription label.  Use the cap on the container to measure 15ml (fill cap to fill line)  Swish (gargle if you can) the mouthwash in your mouth for at least 30 seconds, (do not swallow) spit out.  After you use your CHG rinse, do not rinse your mouth with water, drink or eat.  Please refer to prescription label for the appropriate time to resume oral intake.    What side effects might I have using the CHG oral/dental rinse?  CHG rinse will stick to the plaque on the teeth. Brush and floss just before use. Teeth brushing will help avoid staining of plaque during use.    Who should I contact if I have questions about the CHG oral/dental rinse?  Please call [facility], [facility] at [phone] if you have any questions.

## 2024-03-01 LAB — STAPHYLOCOCCUS SPEC CULT: NORMAL

## 2024-03-04 ENCOUNTER — TELEPHONE (OUTPATIENT)
Dept: PRIMARY CARE | Facility: CLINIC | Age: 79
End: 2024-03-04
Payer: MEDICARE

## 2024-03-04 DIAGNOSIS — R21 RASH: Primary | ICD-10-CM

## 2024-03-04 RX ORDER — CLOTRIMAZOLE AND BETAMETHASONE DIPROPIONATE 10; .64 MG/G; MG/G
1 CREAM TOPICAL 2 TIMES DAILY
Qty: 30 G | Refills: 11 | Status: SHIPPED | OUTPATIENT
Start: 2024-03-04 | End: 2024-05-03

## 2024-03-04 NOTE — TELEPHONE ENCOUNTER
Patient has rash under her breast. Was using CeraV. Dr Varela said to DC. Please send RX cream to [facility] on [address] in [facility].

## 2024-03-06 ENCOUNTER — OFFICE VISIT (OUTPATIENT)
Dept: PRIMARY CARE | Facility: CLINIC | Age: 79
End: 2024-03-06
Payer: MEDICARE

## 2024-03-06 VITALS
HEIGHT: 65 IN | OXYGEN SATURATION: 96 % | DIASTOLIC BLOOD PRESSURE: 78 MMHG | HEART RATE: 68 BPM | SYSTOLIC BLOOD PRESSURE: 148 MMHG | BODY MASS INDEX: 28.16 KG/M2 | WEIGHT: 169 LBS

## 2024-03-06 DIAGNOSIS — R21 RASH IN ADULT: ICD-10-CM

## 2024-03-06 DIAGNOSIS — B37.2 CANDIDAL INTERTRIGO: ICD-10-CM

## 2024-03-06 DIAGNOSIS — R35.0 URINARY FREQUENCY: ICD-10-CM

## 2024-03-06 DIAGNOSIS — R30.0 DYSURIA: Primary | ICD-10-CM

## 2024-03-06 LAB
POC APPEARANCE, URINE: CLEAR
POC BILIRUBIN, URINE: NEGATIVE
POC BLOOD, URINE: NEGATIVE
POC COLOR, URINE: YELLOW
POC GLUCOSE, URINE: NEGATIVE MG/DL
POC KETONES, URINE: NEGATIVE MG/DL
POC LEUKOCYTES, URINE: NEGATIVE
POC NITRITE,URINE: NEGATIVE
POC PH, URINE: 7 PH
POC PROTEIN, URINE: NEGATIVE MG/DL
POC SPECIFIC GRAVITY, URINE: 1.01
POC UROBILINOGEN, URINE: 0.2 EU/DL

## 2024-03-06 PROCEDURE — 1036F TOBACCO NON-USER: CPT | Performed by: PHYSICIAN ASSISTANT

## 2024-03-06 PROCEDURE — 99213 OFFICE O/P EST LOW 20 MIN: CPT | Performed by: PHYSICIAN ASSISTANT

## 2024-03-06 PROCEDURE — 81003 URINALYSIS AUTO W/O SCOPE: CPT | Performed by: PHYSICIAN ASSISTANT

## 2024-03-06 PROCEDURE — 1160F RVW MEDS BY RX/DR IN RCRD: CPT | Performed by: PHYSICIAN ASSISTANT

## 2024-03-06 PROCEDURE — 1159F MED LIST DOCD IN RCRD: CPT | Performed by: PHYSICIAN ASSISTANT

## 2024-03-06 PROCEDURE — 3078F DIAST BP <80 MM HG: CPT | Performed by: PHYSICIAN ASSISTANT

## 2024-03-06 PROCEDURE — 1125F AMNT PAIN NOTED PAIN PRSNT: CPT | Performed by: PHYSICIAN ASSISTANT

## 2024-03-06 PROCEDURE — 3077F SYST BP >= 140 MM HG: CPT | Performed by: PHYSICIAN ASSISTANT

## 2024-03-06 RX ORDER — NYSTATIN 100000 [USP'U]/G
1 POWDER TOPICAL 2 TIMES DAILY
Qty: 30 G | Refills: 1 | Status: SHIPPED | OUTPATIENT
Start: 2024-03-06 | End: 2024-03-17 | Stop reason: HOSPADM

## 2024-03-06 ASSESSMENT — ENCOUNTER SYMPTOMS
DEPRESSION: 0
OCCASIONAL FEELINGS OF UNSTEADINESS: 0
LOSS OF SENSATION IN FEET: 0

## 2024-03-06 ASSESSMENT — PAIN SCALES - GENERAL: PAINLEVEL: 7

## 2024-03-06 NOTE — PROGRESS NOTES
Subjective   Patient ID:   Jessy Mcdaniel is a 78 y.o. female who presents for UTI and Rash.  HPI  Dr. Varela patient.  Here with acute symptoms:  Symptoms x 1-2 weeks.  Urinary frequency.  Was put on an antibiotic 2 weeks ago.  Has increased her fluid intake as well.  Urine today is clear.  Has a knee replacement 1 week from today - worried she will not be cleared due to her urine and/or rash.  Also has a rash under her breasts x 1 week.  States she does sweat in this area.  Dr. Varela sent in Clotrimazole-Betamethasone cream 2 days ago.  Does not feel this is helping.    Review of Systems  12 point review of systems negative unless stated above in HPI    Vitals:    03/06/24 1426   BP: 148/78   Pulse: 68   SpO2: 96%     Physical Exam  General: Alert and oriented, well nourished, no acute distress.  Lungs: Clear to auscultation, non-labored respiration.  Heart: Normal rate, regular rhythm, no murmur, gallop or edema.  Neurologic: Awake, alert, and oriented X3, CN II-XII intact.  Psychiatric: Cooperative, appropriate mood and affect.  Skin: Chaperone offered and declined. Candidal rash under both breasts.    Assessment/Plan   It was nice meeting you!  Your urine in office today is clear.  No signs of infection.  Your rash under your breasts should not prevent you from being cleared for your surgery.  I have sent in Nystatin powder to try.  If symptoms persist or worsen despite current plan of care, please contact your healthcare provider for further evaluation.  Good luck with surgery!  Patient instructed to contact the office if there are any questions regarding their care or treatment.   [facility] [phone]    Fu with Dr. Varela  Diagnoses and all orders for this visit:  Dysuria  -     POCT UA Automated manually resulted  Rash in adult  Candidal intertrigo  -     nystatin (Mycostatin) 100,000 unit/gram powder; Apply 1 Application topically 2 times a day.  Urinary frequency

## 2024-03-06 NOTE — H&P (VIEW-ONLY)
Subjective   Patient ID:   Jessy Mcdaniel is a 78 y.o. female who presents for UTI and Rash.  HPI  Dr. Varela patient.  Here with acute symptoms:  Symptoms x 1-2 weeks.  Urinary frequency.  Was put on an antibiotic 2 weeks ago.  Has increased her fluid intake as well.  Urine today is clear.  Has a knee replacement 1 week from today - worried she will not be cleared due to her urine and/or rash.  Also has a rash under her breasts x 1 week.  States she does sweat in this area.  Dr. Varela sent in Clotrimazole-Betamethasone cream 2 days ago.  Does not feel this is helping.    Review of Systems  12 point review of systems negative unless stated above in HPI    Vitals:    03/06/24 1426   BP: 148/78   Pulse: 68   SpO2: 96%     Physical Exam  General: Alert and oriented, well nourished, no acute distress.  Lungs: Clear to auscultation, non-labored respiration.  Heart: Normal rate, regular rhythm, no murmur, gallop or edema.  Neurologic: Awake, alert, and oriented X3, CN II-XII intact.  Psychiatric: Cooperative, appropriate mood and affect.  Skin: Chaperone offered and declined. Candidal rash under both breasts.    Assessment/Plan   It was nice meeting you!  Your urine in office today is clear.  No signs of infection.  Your rash under your breasts should not prevent you from being cleared for your surgery.  I have sent in Nystatin powder to try.  If symptoms persist or worsen despite current plan of care, please contact your healthcare provider for further evaluation.  Good luck with surgery!  Patient instructed to contact the office if there are any questions regarding their care or treatment.   [facility] [phone]    Fu with Dr. Varela  Diagnoses and all orders for this visit:  Dysuria  -     POCT UA Automated manually resulted  Rash in adult  Candidal intertrigo  -     nystatin (Mycostatin) 100,000 unit/gram powder; Apply 1 Application topically 2 times a day.  Urinary frequency

## 2024-03-08 ENCOUNTER — TELEPHONE (OUTPATIENT)
Dept: ORTHOPEDIC SURGERY | Facility: CLINIC | Age: 79
End: 2024-03-08
Payer: MEDICARE

## 2024-03-08 NOTE — TELEPHONE ENCOUNTER
Jessy had multiple questions regarding her surgery day, credit card at hospital admission,bringing her home meds into hospital the day of surgery, which Dr Guerra asked me to inform her not to bring her home meds into the hospital.  Call placed to Jessy  to instruct her on credit card use.

## 2024-03-13 ENCOUNTER — APPOINTMENT (OUTPATIENT)
Dept: RADIOLOGY | Facility: HOSPITAL | Age: 79
DRG: 470 | End: 2024-03-13
Payer: MEDICARE

## 2024-03-13 ENCOUNTER — ANESTHESIA (OUTPATIENT)
Dept: OPERATING ROOM | Facility: HOSPITAL | Age: 79
DRG: 470 | End: 2024-03-13
Payer: MEDICARE

## 2024-03-13 ENCOUNTER — HOSPITAL ENCOUNTER (INPATIENT)
Facility: HOSPITAL | Age: 79
LOS: 4 days | Discharge: NURSING, REHAB OR LONG-TERM CARE | DRG: 470 | End: 2024-03-17
Attending: ORTHOPAEDIC SURGERY | Admitting: ORTHOPAEDIC SURGERY
Payer: MEDICARE

## 2024-03-13 ENCOUNTER — ANESTHESIA EVENT (OUTPATIENT)
Dept: OPERATING ROOM | Facility: HOSPITAL | Age: 79
DRG: 470 | End: 2024-03-13
Payer: MEDICARE

## 2024-03-13 DIAGNOSIS — M17.11 OSTEOARTHRITIS OF RIGHT KNEE, UNSPECIFIED OSTEOARTHRITIS TYPE: Primary | ICD-10-CM

## 2024-03-13 DIAGNOSIS — I48.20 CHRONIC ATRIAL FIBRILLATION (MULTI): ICD-10-CM

## 2024-03-13 DIAGNOSIS — M17.11 PRIMARY OSTEOARTHRITIS OF RIGHT KNEE: ICD-10-CM

## 2024-03-13 DIAGNOSIS — G89.29 CHRONIC PAIN OF RIGHT KNEE: ICD-10-CM

## 2024-03-13 DIAGNOSIS — M25.561 CHRONIC PAIN OF RIGHT KNEE: ICD-10-CM

## 2024-03-13 DIAGNOSIS — R60.0 EDEMA LEG: ICD-10-CM

## 2024-03-13 DIAGNOSIS — Z96.651 HISTORY OF TOTAL KNEE ARTHROPLASTY, RIGHT: ICD-10-CM

## 2024-03-13 PROCEDURE — A4649 SURGICAL SUPPLIES: HCPCS | Performed by: ORTHOPAEDIC SURGERY

## 2024-03-13 PROCEDURE — 7100000011 HC EXTENDED STAY RECOVERY HOURLY - NURSING UNIT

## 2024-03-13 PROCEDURE — 0SRC0J9 REPLACEMENT OF RIGHT KNEE JOINT WITH SYNTHETIC SUBSTITUTE, CEMENTED, OPEN APPROACH: ICD-10-PCS | Performed by: ORTHOPAEDIC SURGERY

## 2024-03-13 PROCEDURE — 2500000005 HC RX 250 GENERAL PHARMACY W/O HCPCS: Performed by: ORTHOPAEDIC SURGERY

## 2024-03-13 PROCEDURE — 2500000004 HC RX 250 GENERAL PHARMACY W/ HCPCS (ALT 636 FOR OP/ED): Performed by: STUDENT IN AN ORGANIZED HEALTH CARE EDUCATION/TRAINING PROGRAM

## 2024-03-13 PROCEDURE — 9420000001 HC RT PATIENT EDUCATION 5 MIN

## 2024-03-13 PROCEDURE — 2500000004 HC RX 250 GENERAL PHARMACY W/ HCPCS (ALT 636 FOR OP/ED): Performed by: NURSE ANESTHETIST, CERTIFIED REGISTERED

## 2024-03-13 PROCEDURE — 3700000002 HC GENERAL ANESTHESIA TIME - EACH INCREMENTAL 1 MINUTE: Performed by: ORTHOPAEDIC SURGERY

## 2024-03-13 PROCEDURE — 7100000001 HC RECOVERY ROOM TIME - INITIAL BASE CHARGE: Performed by: ORTHOPAEDIC SURGERY

## 2024-03-13 PROCEDURE — 1200000002 HC GENERAL ROOM WITH TELEMETRY DAILY

## 2024-03-13 PROCEDURE — 2500000004 HC RX 250 GENERAL PHARMACY W/ HCPCS (ALT 636 FOR OP/ED)

## 2024-03-13 PROCEDURE — 3700000001 HC GENERAL ANESTHESIA TIME - INITIAL BASE CHARGE: Performed by: ORTHOPAEDIC SURGERY

## 2024-03-13 PROCEDURE — C1776 JOINT DEVICE (IMPLANTABLE): HCPCS | Performed by: ORTHOPAEDIC SURGERY

## 2024-03-13 PROCEDURE — 2780000003 HC OR 278 NO HCPCS: Performed by: ORTHOPAEDIC SURGERY

## 2024-03-13 PROCEDURE — 97165 OT EVAL LOW COMPLEX 30 MIN: CPT | Mod: GO

## 2024-03-13 PROCEDURE — 97530 THERAPEUTIC ACTIVITIES: CPT | Mod: GP | Performed by: PHYSICAL THERAPIST

## 2024-03-13 PROCEDURE — 2500000001 HC RX 250 WO HCPCS SELF ADMINISTERED DRUGS (ALT 637 FOR MEDICARE OP): Performed by: ORTHOPAEDIC SURGERY

## 2024-03-13 PROCEDURE — 99100 ANES PT EXTEME AGE<1 YR&>70: CPT | Performed by: STUDENT IN AN ORGANIZED HEALTH CARE EDUCATION/TRAINING PROGRAM

## 2024-03-13 PROCEDURE — 2500000005 HC RX 250 GENERAL PHARMACY W/O HCPCS: Performed by: NURSE ANESTHETIST, CERTIFIED REGISTERED

## 2024-03-13 PROCEDURE — 97535 SELF CARE MNGMENT TRAINING: CPT | Mod: GO

## 2024-03-13 PROCEDURE — 2500000004 HC RX 250 GENERAL PHARMACY W/ HCPCS (ALT 636 FOR OP/ED): Performed by: ORTHOPAEDIC SURGERY

## 2024-03-13 PROCEDURE — 73560 X-RAY EXAM OF KNEE 1 OR 2: CPT | Mod: RT

## 2024-03-13 PROCEDURE — 97161 PT EVAL LOW COMPLEX 20 MIN: CPT | Mod: GP | Performed by: PHYSICAL THERAPIST

## 2024-03-13 PROCEDURE — A27447 PR TOTAL KNEE ARTHROPLASTY: Performed by: STUDENT IN AN ORGANIZED HEALTH CARE EDUCATION/TRAINING PROGRAM

## 2024-03-13 PROCEDURE — 27447 TOTAL KNEE ARTHROPLASTY: CPT | Performed by: PHYSICIAN ASSISTANT

## 2024-03-13 PROCEDURE — 27447 TOTAL KNEE ARTHROPLASTY: CPT | Performed by: ORTHOPAEDIC SURGERY

## 2024-03-13 PROCEDURE — 7100000002 HC RECOVERY ROOM TIME - EACH INCREMENTAL 1 MINUTE: Performed by: ORTHOPAEDIC SURGERY

## 2024-03-13 PROCEDURE — 3600000018 HC OR TIME - INITIAL BASE CHARGE - PROCEDURE LEVEL SIX: Performed by: ORTHOPAEDIC SURGERY

## 2024-03-13 PROCEDURE — 64447 NJX AA&/STRD FEMORAL NRV IMG: CPT | Performed by: STUDENT IN AN ORGANIZED HEALTH CARE EDUCATION/TRAINING PROGRAM

## 2024-03-13 PROCEDURE — 2720000007 HC OR 272 NO HCPCS: Performed by: ORTHOPAEDIC SURGERY

## 2024-03-13 PROCEDURE — A27447 PR TOTAL KNEE ARTHROPLASTY: Performed by: NURSE ANESTHETIST, CERTIFIED REGISTERED

## 2024-03-13 PROCEDURE — 3600000017 HC OR TIME - EACH INCREMENTAL 1 MINUTE - PROCEDURE LEVEL SIX: Performed by: ORTHOPAEDIC SURGERY

## 2024-03-13 DEVICE — ATTUNE KNEE SYSTEM FEMORAL POSTERIOR STABILIZED SIZE 5 RIGHT CEMENTED
Type: IMPLANTABLE DEVICE | Site: KNEE | Status: FUNCTIONAL
Brand: ATTUNE

## 2024-03-13 DEVICE — CONCISE CEMENT SCULPS KIT
Type: IMPLANTABLE DEVICE | Site: KNEE | Status: FUNCTIONAL
Brand: CONCISE

## 2024-03-13 DEVICE — ATTUNE PATELLA MEDIALIZED DOME 41MM CEMENTED AOX
Type: IMPLANTABLE DEVICE | Site: KNEE | Status: FUNCTIONAL
Brand: ATTUNE

## 2024-03-13 DEVICE — ATTUNE KNEE SYSTEM TIBIAL INSERT FIXED BEARING POSTERIOR STABILIZED 5 5MM AOX
Type: IMPLANTABLE DEVICE | Site: KNEE | Status: FUNCTIONAL
Brand: ATTUNE

## 2024-03-13 DEVICE — SIMPLEX® HV IS A FAST-SETTING ACRYLIC RESIN FOR USE IN BONE SURGERY. MIXING THE TWO SEPARATE STERILE COMPONENTS PRODUCES A DUCTILE BONE CEMENT WHICH, AFTER HARDENING, FIXES THE IMPLANT AND TRANSFERS STRESSES PRODUCED DURING MOVEMENT EVENLY TO THE BONE. SIMPLEX® HV CEMENT POWDER ALSO CONTAINS INSOLUBLE ZIRCONIUM DIOXIDE AS AN X-RAY CONTRAST MEDIUM. SIMPLEX® HV DOES NOT EMIT A SIGNAL AND DOES NOT POSE A SAFETY RISK IN A MAGNETIC RESONANCE ENVIRONMENT.
Type: IMPLANTABLE DEVICE | Site: KNEE | Status: FUNCTIONAL
Brand: SIMPLEX HV

## 2024-03-13 RX ORDER — PROPOFOL 10 MG/ML
INJECTION, EMULSION INTRAVENOUS AS NEEDED
Status: DISCONTINUED | OUTPATIENT
Start: 2024-03-13 | End: 2024-03-13

## 2024-03-13 RX ORDER — CEFAZOLIN SODIUM 2 G/100ML
2 INJECTION, SOLUTION INTRAVENOUS EVERY 8 HOURS
Status: COMPLETED | OUTPATIENT
Start: 2024-03-13 | End: 2024-03-14

## 2024-03-13 RX ORDER — HYDROMORPHONE HYDROCHLORIDE 2 MG/ML
INJECTION, SOLUTION INTRAMUSCULAR; INTRAVENOUS; SUBCUTANEOUS AS NEEDED
Status: DISCONTINUED | OUTPATIENT
Start: 2024-03-13 | End: 2024-03-13

## 2024-03-13 RX ORDER — FENTANYL CITRATE 50 UG/ML
INJECTION, SOLUTION INTRAMUSCULAR; INTRAVENOUS AS NEEDED
Status: DISCONTINUED | OUTPATIENT
Start: 2024-03-13 | End: 2024-03-13

## 2024-03-13 RX ORDER — MORPHINE SULFATE 2 MG/ML
2 INJECTION, SOLUTION INTRAMUSCULAR; INTRAVENOUS EVERY 2 HOUR PRN
Status: DISCONTINUED | OUTPATIENT
Start: 2024-03-13 | End: 2024-03-17 | Stop reason: HOSPADM

## 2024-03-13 RX ORDER — SODIUM CHLORIDE 9 MG/ML
100 INJECTION, SOLUTION INTRAVENOUS CONTINUOUS
Status: DISCONTINUED | OUTPATIENT
Start: 2024-03-13 | End: 2024-03-14

## 2024-03-13 RX ORDER — CLOTRIMAZOLE AND BETAMETHASONE DIPROPIONATE 10; .64 MG/G; MG/G
1 CREAM TOPICAL 2 TIMES DAILY
Status: DISCONTINUED | OUTPATIENT
Start: 2024-03-13 | End: 2024-03-17 | Stop reason: HOSPADM

## 2024-03-13 RX ORDER — NYSTATIN 100000 [USP'U]/G
1 POWDER TOPICAL 2 TIMES DAILY
Status: DISCONTINUED | OUTPATIENT
Start: 2024-03-13 | End: 2024-03-17 | Stop reason: HOSPADM

## 2024-03-13 RX ORDER — NALOXONE HYDROCHLORIDE 0.4 MG/ML
0.2 INJECTION, SOLUTION INTRAMUSCULAR; INTRAVENOUS; SUBCUTANEOUS EVERY 5 MIN PRN
Status: DISCONTINUED | OUTPATIENT
Start: 2024-03-13 | End: 2024-03-13

## 2024-03-13 RX ORDER — OXYCODONE AND ACETAMINOPHEN 5; 325 MG/1; MG/1
1 TABLET ORAL EVERY 4 HOURS PRN
Status: DISCONTINUED | OUTPATIENT
Start: 2024-03-13 | End: 2024-03-17 | Stop reason: HOSPADM

## 2024-03-13 RX ORDER — ONDANSETRON HYDROCHLORIDE 2 MG/ML
INJECTION, SOLUTION INTRAVENOUS AS NEEDED
Status: DISCONTINUED | OUTPATIENT
Start: 2024-03-13 | End: 2024-03-13

## 2024-03-13 RX ORDER — FENTANYL CITRATE 50 UG/ML
50 INJECTION, SOLUTION INTRAMUSCULAR; INTRAVENOUS EVERY 5 MIN PRN
Status: DISCONTINUED | OUTPATIENT
Start: 2024-03-13 | End: 2024-03-13 | Stop reason: HOSPADM

## 2024-03-13 RX ORDER — DEXAMETHASONE SODIUM PHOSPHATE 100 MG/10ML
INJECTION INTRAMUSCULAR; INTRAVENOUS AS NEEDED
Status: DISCONTINUED | OUTPATIENT
Start: 2024-03-13 | End: 2024-03-13

## 2024-03-13 RX ORDER — LEVOTHYROXINE SODIUM 50 UG/1
50 TABLET ORAL
Status: DISCONTINUED | OUTPATIENT
Start: 2024-03-14 | End: 2024-03-17 | Stop reason: HOSPADM

## 2024-03-13 RX ORDER — SODIUM CHLORIDE, SODIUM LACTATE, POTASSIUM CHLORIDE, CALCIUM CHLORIDE 600; 310; 30; 20 MG/100ML; MG/100ML; MG/100ML; MG/100ML
INJECTION, SOLUTION INTRAVENOUS CONTINUOUS PRN
Status: DISCONTINUED | OUTPATIENT
Start: 2024-03-13 | End: 2024-03-13

## 2024-03-13 RX ORDER — FENTANYL CITRATE 50 UG/ML
50 INJECTION, SOLUTION INTRAMUSCULAR; INTRAVENOUS ONCE
Status: COMPLETED | OUTPATIENT
Start: 2024-03-13 | End: 2024-03-13

## 2024-03-13 RX ORDER — ACETAMINOPHEN 325 MG/1
650 TABLET ORAL EVERY 6 HOURS SCHEDULED
Status: DISCONTINUED | OUTPATIENT
Start: 2024-03-13 | End: 2024-03-17 | Stop reason: HOSPADM

## 2024-03-13 RX ORDER — NALOXONE HYDROCHLORIDE 0.4 MG/ML
0.2 INJECTION, SOLUTION INTRAMUSCULAR; INTRAVENOUS; SUBCUTANEOUS EVERY 5 MIN PRN
Status: DISCONTINUED | OUTPATIENT
Start: 2024-03-13 | End: 2024-03-17 | Stop reason: HOSPADM

## 2024-03-13 RX ORDER — SODIUM CHLORIDE, SODIUM LACTATE, POTASSIUM CHLORIDE, CALCIUM CHLORIDE 600; 310; 30; 20 MG/100ML; MG/100ML; MG/100ML; MG/100ML
40 INJECTION, SOLUTION INTRAVENOUS CONTINUOUS
Status: DISCONTINUED | OUTPATIENT
Start: 2024-03-13 | End: 2024-03-13 | Stop reason: HOSPADM

## 2024-03-13 RX ORDER — ACETAMINOPHEN 325 MG/1
650 TABLET ORAL EVERY 4 HOURS PRN
Status: DISCONTINUED | OUTPATIENT
Start: 2024-03-13 | End: 2024-03-17 | Stop reason: HOSPADM

## 2024-03-13 RX ORDER — FLECAINIDE ACETATE 50 MG/1
50 TABLET ORAL 2 TIMES DAILY
Status: DISCONTINUED | OUTPATIENT
Start: 2024-03-13 | End: 2024-03-17 | Stop reason: HOSPADM

## 2024-03-13 RX ORDER — TRIAMTERENE/HYDROCHLOROTHIAZID 37.5-25 MG
1 TABLET ORAL EVERY MORNING
Status: DISCONTINUED | OUTPATIENT
Start: 2024-03-13 | End: 2024-03-17 | Stop reason: HOSPADM

## 2024-03-13 RX ORDER — PHENYLEPHRINE HCL IN 0.9% NACL 1 MG/10 ML
SYRINGE (ML) INTRAVENOUS AS NEEDED
Status: DISCONTINUED | OUTPATIENT
Start: 2024-03-13 | End: 2024-03-13

## 2024-03-13 RX ORDER — TRANEXAMIC ACID 100 MG/ML
INJECTION, SOLUTION INTRAVENOUS AS NEEDED
Status: DISCONTINUED | OUTPATIENT
Start: 2024-03-13 | End: 2024-03-13 | Stop reason: HOSPADM

## 2024-03-13 RX ORDER — FUROSEMIDE 20 MG/1
20 TABLET ORAL DAILY
Status: DISCONTINUED | OUTPATIENT
Start: 2024-03-13 | End: 2024-03-17 | Stop reason: HOSPADM

## 2024-03-13 RX ORDER — METOCLOPRAMIDE HYDROCHLORIDE 5 MG/ML
10 INJECTION INTRAMUSCULAR; INTRAVENOUS EVERY 6 HOURS PRN
Status: DISCONTINUED | OUTPATIENT
Start: 2024-03-13 | End: 2024-03-17 | Stop reason: HOSPADM

## 2024-03-13 RX ORDER — LABETALOL HYDROCHLORIDE 5 MG/ML
5 INJECTION, SOLUTION INTRAVENOUS EVERY 5 MIN PRN
Status: DISCONTINUED | OUTPATIENT
Start: 2024-03-13 | End: 2024-03-13 | Stop reason: HOSPADM

## 2024-03-13 RX ORDER — POLYETHYLENE GLYCOL 3350 17 G/17G
17 POWDER, FOR SOLUTION ORAL DAILY
Status: DISCONTINUED | OUTPATIENT
Start: 2024-03-13 | End: 2024-03-17 | Stop reason: HOSPADM

## 2024-03-13 RX ORDER — CEFAZOLIN SODIUM 2 G/100ML
2 INJECTION, SOLUTION INTRAVENOUS EVERY 8 HOURS
Status: DISCONTINUED | OUTPATIENT
Start: 2024-03-13 | End: 2024-03-13

## 2024-03-13 RX ORDER — LIDOCAINE HYDROCHLORIDE 20 MG/ML
INJECTION, SOLUTION INFILTRATION; PERINEURAL AS NEEDED
Status: DISCONTINUED | OUTPATIENT
Start: 2024-03-13 | End: 2024-03-13

## 2024-03-13 RX ORDER — SODIUM CHLORIDE 9 MG/ML
100 INJECTION, SOLUTION INTRAVENOUS CONTINUOUS
Status: DISCONTINUED | OUTPATIENT
Start: 2024-03-13 | End: 2024-03-13

## 2024-03-13 RX ORDER — ALBUTEROL SULFATE 0.83 MG/ML
2.5 SOLUTION RESPIRATORY (INHALATION) EVERY 30 MIN PRN
Status: DISCONTINUED | OUTPATIENT
Start: 2024-03-13 | End: 2024-03-13 | Stop reason: HOSPADM

## 2024-03-13 RX ORDER — IPRATROPIUM BROMIDE 0.5 MG/2.5ML
500 SOLUTION RESPIRATORY (INHALATION) EVERY 30 MIN PRN
Status: DISCONTINUED | OUTPATIENT
Start: 2024-03-13 | End: 2024-03-13 | Stop reason: HOSPADM

## 2024-03-13 RX ORDER — KETOROLAC TROMETHAMINE 15 MG/ML
15 INJECTION, SOLUTION INTRAMUSCULAR; INTRAVENOUS ONCE
Status: COMPLETED | OUTPATIENT
Start: 2024-03-13 | End: 2024-03-13

## 2024-03-13 RX ORDER — ONDANSETRON HYDROCHLORIDE 2 MG/ML
4 INJECTION, SOLUTION INTRAVENOUS ONCE AS NEEDED
Status: COMPLETED | OUTPATIENT
Start: 2024-03-13 | End: 2024-03-13

## 2024-03-13 RX ORDER — MIDAZOLAM HYDROCHLORIDE 1 MG/ML
2 INJECTION, SOLUTION INTRAMUSCULAR; INTRAVENOUS ONCE
Status: COMPLETED | OUTPATIENT
Start: 2024-03-13 | End: 2024-03-13

## 2024-03-13 RX ORDER — CEFAZOLIN 1 G/1
INJECTION, POWDER, FOR SOLUTION INTRAVENOUS AS NEEDED
Status: DISCONTINUED | OUTPATIENT
Start: 2024-03-13 | End: 2024-03-13

## 2024-03-13 RX ORDER — CEFAZOLIN SODIUM 2 G/100ML
2 INJECTION, SOLUTION INTRAVENOUS ONCE
Status: COMPLETED | OUTPATIENT
Start: 2024-03-13 | End: 2024-03-13

## 2024-03-13 RX ORDER — VANCOMYCIN HYDROCHLORIDE 1 G/20ML
INJECTION, POWDER, LYOPHILIZED, FOR SOLUTION INTRAVENOUS AS NEEDED
Status: DISCONTINUED | OUTPATIENT
Start: 2024-03-13 | End: 2024-03-13 | Stop reason: HOSPADM

## 2024-03-13 RX ORDER — PANTOPRAZOLE SODIUM 40 MG/1
40 TABLET, DELAYED RELEASE ORAL
Status: DISCONTINUED | OUTPATIENT
Start: 2024-03-14 | End: 2024-03-17 | Stop reason: HOSPADM

## 2024-03-13 RX ADMIN — CLOTRIMAZOLE AND BETAMETHASONE DIPROPIONATE 1 APPLICATION: 10; .5 CREAM TOPICAL at 20:57

## 2024-03-13 RX ADMIN — PROPOFOL 30 MG: 10 INJECTION, EMULSION INTRAVENOUS at 08:21

## 2024-03-13 RX ADMIN — SODIUM CHLORIDE, POTASSIUM CHLORIDE, SODIUM LACTATE AND CALCIUM CHLORIDE: 600; 310; 30; 20 INJECTION, SOLUTION INTRAVENOUS at 10:02

## 2024-03-13 RX ADMIN — FLECAINIDE ACETATE 50 MG: 50 TABLET ORAL at 20:57

## 2024-03-13 RX ADMIN — LIDOCAINE HYDROCHLORIDE 30 MG: 20 INJECTION, SOLUTION INFILTRATION; PERINEURAL at 07:58

## 2024-03-13 RX ADMIN — FENTANYL CITRATE 50 MCG: 50 INJECTION INTRAMUSCULAR; INTRAVENOUS at 10:45

## 2024-03-13 RX ADMIN — FENTANYL CITRATE 50 MCG: 50 INJECTION, SOLUTION INTRAMUSCULAR; INTRAVENOUS at 08:28

## 2024-03-13 RX ADMIN — ONDANSETRON HYDROCHLORIDE 4 MG: 2 INJECTION INTRAMUSCULAR; INTRAVENOUS at 11:30

## 2024-03-13 RX ADMIN — SODIUM CHLORIDE 100 ML/HR: 900 INJECTION, SOLUTION INTRAVENOUS at 06:50

## 2024-03-13 RX ADMIN — MIDAZOLAM HYDROCHLORIDE 2 MG: 2 INJECTION, SOLUTION INTRAMUSCULAR; INTRAVENOUS at 07:47

## 2024-03-13 RX ADMIN — SODIUM CHLORIDE, POTASSIUM CHLORIDE, SODIUM LACTATE AND CALCIUM CHLORIDE: 600; 310; 30; 20 INJECTION, SOLUTION INTRAVENOUS at 07:55

## 2024-03-13 RX ADMIN — FENTANYL CITRATE 50 MCG: 50 INJECTION INTRAMUSCULAR; INTRAVENOUS at 11:05

## 2024-03-13 RX ADMIN — Medication 2 L/MIN: at 13:30

## 2024-03-13 RX ADMIN — KETOROLAC TROMETHAMINE 15 MG: 15 INJECTION, SOLUTION INTRAMUSCULAR; INTRAVENOUS at 12:15

## 2024-03-13 RX ADMIN — FENTANYL CITRATE 50 MCG: 50 INJECTION, SOLUTION INTRAMUSCULAR; INTRAVENOUS at 08:18

## 2024-03-13 RX ADMIN — FENTANYL CITRATE 50 MCG: 50 INJECTION INTRAMUSCULAR; INTRAVENOUS at 11:00

## 2024-03-13 RX ADMIN — CEFAZOLIN 2 G: 330 INJECTION, POWDER, FOR SOLUTION INTRAMUSCULAR; INTRAVENOUS at 08:07

## 2024-03-13 RX ADMIN — PROPOFOL 30 MG: 10 INJECTION, EMULSION INTRAVENOUS at 07:59

## 2024-03-13 RX ADMIN — ACETAMINOPHEN 650 MG: 325 TABLET ORAL at 18:27

## 2024-03-13 RX ADMIN — HYDROMORPHONE HYDROCHLORIDE 0.4 MG: 2 INJECTION, SOLUTION INTRAMUSCULAR; INTRAVENOUS; SUBCUTANEOUS at 10:21

## 2024-03-13 RX ADMIN — PROPOFOL 100 MG: 10 INJECTION, EMULSION INTRAVENOUS at 07:58

## 2024-03-13 RX ADMIN — Medication 100 MCG: at 09:30

## 2024-03-13 RX ADMIN — Medication 100 MCG: at 09:37

## 2024-03-13 RX ADMIN — CEFAZOLIN SODIUM 2 G: 2 INJECTION, SOLUTION INTRAVENOUS at 06:30

## 2024-03-13 RX ADMIN — HYDROMORPHONE HYDROCHLORIDE 0.4 MG: 2 INJECTION, SOLUTION INTRAMUSCULAR; INTRAVENOUS; SUBCUTANEOUS at 10:30

## 2024-03-13 RX ADMIN — ONDANSETRON HYDROCHLORIDE 4 MG: 2 INJECTION INTRAMUSCULAR; INTRAVENOUS at 08:14

## 2024-03-13 RX ADMIN — FENTANYL CITRATE 50 MCG: 50 INJECTION INTRAMUSCULAR; INTRAVENOUS at 07:47

## 2024-03-13 RX ADMIN — HYDROMORPHONE HYDROCHLORIDE 0.4 MG: 2 INJECTION, SOLUTION INTRAMUSCULAR; INTRAVENOUS; SUBCUTANEOUS at 08:47

## 2024-03-13 RX ADMIN — CEFAZOLIN SODIUM 2 G: 2 INJECTION, SOLUTION INTRAVENOUS at 18:27

## 2024-03-13 RX ADMIN — Medication 100 MCG: at 08:52

## 2024-03-13 RX ADMIN — DEXAMETHASONE SODIUM PHOSPHATE 4 MG: 10 INJECTION INTRAMUSCULAR; INTRAVENOUS at 08:14

## 2024-03-13 SDOH — HEALTH STABILITY: PHYSICAL HEALTH: ON AVERAGE, HOW MANY DAYS PER WEEK DO YOU ENGAGE IN MODERATE TO STRENUOUS EXERCISE (LIKE A BRISK WALK)?: 5 DAYS

## 2024-03-13 SDOH — SOCIAL STABILITY: SOCIAL INSECURITY: ABUSE: ADULT

## 2024-03-13 SDOH — HEALTH STABILITY: MENTAL HEALTH: HOW OFTEN DO YOU HAVE A DRINK CONTAINING ALCOHOL?: NEVER

## 2024-03-13 SDOH — HEALTH STABILITY: MENTAL HEALTH
STRESS IS WHEN SOMEONE FEELS TENSE, NERVOUS, ANXIOUS, OR CAN'T SLEEP AT NIGHT BECAUSE THEIR MIND IS TROUBLED. HOW STRESSED ARE YOU?: NOT AT ALL

## 2024-03-13 SDOH — HEALTH STABILITY: PHYSICAL HEALTH: ON AVERAGE, HOW MANY MINUTES DO YOU ENGAGE IN EXERCISE AT THIS LEVEL?: 30 MIN

## 2024-03-13 SDOH — SOCIAL STABILITY: SOCIAL INSECURITY: ARE THERE ANY APPARENT SIGNS OF INJURIES/BEHAVIORS THAT COULD BE RELATED TO ABUSE/NEGLECT?: NO

## 2024-03-13 SDOH — SOCIAL STABILITY: SOCIAL NETWORK: HOW OFTEN DO YOU ATTEND CHURCH OR RELIGIOUS SERVICES?: MORE THAN 4 TIMES PER YEAR

## 2024-03-13 SDOH — SOCIAL STABILITY: SOCIAL INSECURITY: WITHIN THE LAST YEAR, HAVE YOU BEEN HUMILIATED OR EMOTIONALLY ABUSED IN OTHER WAYS BY YOUR PARTNER OR EX-PARTNER?: NO

## 2024-03-13 SDOH — HEALTH STABILITY: MENTAL HEALTH: CURRENT SMOKER: 0

## 2024-03-13 SDOH — SOCIAL STABILITY: SOCIAL NETWORK: HOW OFTEN DO YOU ATTENT MEETINGS OF THE CLUB OR ORGANIZATION YOU BELONG TO?: MORE THAN 4 TIMES PER YEAR

## 2024-03-13 SDOH — SOCIAL STABILITY: SOCIAL INSECURITY
WITHIN THE LAST YEAR, HAVE TO BEEN RAPED OR FORCED TO HAVE ANY KIND OF SEXUAL ACTIVITY BY YOUR PARTNER OR EX-PARTNER?: NO

## 2024-03-13 SDOH — SOCIAL STABILITY: SOCIAL NETWORK
DO YOU BELONG TO ANY CLUBS OR ORGANIZATIONS SUCH AS CHURCH GROUPS UNIONS, FRATERNAL OR ATHLETIC GROUPS, OR SCHOOL GROUPS?: YES

## 2024-03-13 SDOH — HEALTH STABILITY: MENTAL HEALTH: HOW OFTEN DO YOU HAVE 6 OR MORE DRINKS ON ONE OCCASION?: NEVER

## 2024-03-13 SDOH — SOCIAL STABILITY: SOCIAL INSECURITY: DO YOU FEEL UNSAFE GOING BACK TO THE PLACE WHERE YOU ARE LIVING?: NO

## 2024-03-13 SDOH — SOCIAL STABILITY: SOCIAL INSECURITY
WITHIN THE LAST YEAR, HAVE YOU BEEN KICKED, HIT, SLAPPED, OR OTHERWISE PHYSICALLY HURT BY YOUR PARTNER OR EX-PARTNER?: NO

## 2024-03-13 SDOH — SOCIAL STABILITY: SOCIAL INSECURITY: HAVE YOU HAD THOUGHTS OF HARMING ANYONE ELSE?: NO

## 2024-03-13 SDOH — SOCIAL STABILITY: SOCIAL INSECURITY: WITHIN THE LAST YEAR, HAVE YOU BEEN AFRAID OF YOUR PARTNER OR EX-PARTNER?: NO

## 2024-03-13 SDOH — ECONOMIC STABILITY: HOUSING INSECURITY
IN THE LAST 12 MONTHS, WAS THERE A TIME WHEN YOU DID NOT HAVE A STEADY PLACE TO SLEEP OR SLEPT IN A SHELTER (INCLUDING NOW)?: NO

## 2024-03-13 SDOH — ECONOMIC STABILITY: INCOME INSECURITY: HOW HARD IS IT FOR YOU TO PAY FOR THE VERY BASICS LIKE FOOD, HOUSING, MEDICAL CARE, AND HEATING?: NOT VERY HARD

## 2024-03-13 SDOH — SOCIAL STABILITY: SOCIAL INSECURITY: WERE YOU ABLE TO COMPLETE ALL THE BEHAVIORAL HEALTH SCREENINGS?: YES

## 2024-03-13 SDOH — ECONOMIC STABILITY: FOOD INSECURITY: WITHIN THE PAST 12 MONTHS, THE FOOD YOU BOUGHT JUST DIDN'T LAST AND YOU DIDN'T HAVE MONEY TO GET MORE.: NEVER TRUE

## 2024-03-13 SDOH — HEALTH STABILITY: MENTAL HEALTH: HOW MANY STANDARD DRINKS CONTAINING ALCOHOL DO YOU HAVE ON A TYPICAL DAY?: PATIENT DOES NOT DRINK

## 2024-03-13 SDOH — HEALTH STABILITY: PHYSICAL HEALTH: ON AVERAGE, HOW MANY DAYS PER WEEK DO YOU ENGAGE IN MODERATE TO STRENUOUS EXERCISE (LIKE A BRISK WALK)?: 4 DAYS

## 2024-03-13 SDOH — SOCIAL STABILITY: SOCIAL NETWORK: IN A TYPICAL WEEK, HOW MANY TIMES DO YOU TALK ON THE PHONE WITH FAMILY, FRIENDS, OR NEIGHBORS?: THREE TIMES A WEEK

## 2024-03-13 SDOH — SOCIAL STABILITY: SOCIAL NETWORK: HOW OFTEN DO YOU GET TOGETHER WITH FRIENDS OR RELATIVES?: THREE TIMES A WEEK

## 2024-03-13 SDOH — SOCIAL STABILITY: SOCIAL NETWORK: ARE YOU MARRIED, WIDOWED, DIVORCED, SEPARATED, NEVER MARRIED, OR LIVING WITH A PARTNER?: MARRIED

## 2024-03-13 SDOH — ECONOMIC STABILITY: HOUSING INSECURITY: IN THE LAST 12 MONTHS, HOW MANY PLACES HAVE YOU LIVED?: 1

## 2024-03-13 SDOH — ECONOMIC STABILITY: FOOD INSECURITY: WITHIN THE PAST 12 MONTHS, YOU WORRIED THAT YOUR FOOD WOULD RUN OUT BEFORE YOU GOT MONEY TO BUY MORE.: NEVER TRUE

## 2024-03-13 SDOH — SOCIAL STABILITY: SOCIAL INSECURITY: HAS ANYONE EVER THREATENED TO HURT YOUR FAMILY OR YOUR PETS?: NO

## 2024-03-13 SDOH — SOCIAL STABILITY: SOCIAL INSECURITY: DOES ANYONE TRY TO KEEP YOU FROM HAVING/CONTACTING OTHER FRIENDS OR DOING THINGS OUTSIDE YOUR HOME?: NO

## 2024-03-13 SDOH — ECONOMIC STABILITY: TRANSPORTATION INSECURITY
IN THE PAST 12 MONTHS, HAS THE LACK OF TRANSPORTATION KEPT YOU FROM MEDICAL APPOINTMENTS OR FROM GETTING MEDICATIONS?: NO

## 2024-03-13 SDOH — SOCIAL STABILITY: SOCIAL INSECURITY: ARE YOU OR HAVE YOU BEEN THREATENED OR ABUSED PHYSICALLY, EMOTIONALLY, OR SEXUALLY BY ANYONE?: NO

## 2024-03-13 SDOH — ECONOMIC STABILITY: TRANSPORTATION INSECURITY
IN THE PAST 12 MONTHS, HAS LACK OF TRANSPORTATION KEPT YOU FROM MEETINGS, WORK, OR FROM GETTING THINGS NEEDED FOR DAILY LIVING?: NO

## 2024-03-13 SDOH — ECONOMIC STABILITY: INCOME INSECURITY: IN THE PAST 12 MONTHS, HAS THE ELECTRIC, GAS, OIL, OR WATER COMPANY THREATENED TO SHUT OFF SERVICE IN YOUR HOME?: NO

## 2024-03-13 SDOH — ECONOMIC STABILITY: INCOME INSECURITY: IN THE LAST 12 MONTHS, WAS THERE A TIME WHEN YOU WERE NOT ABLE TO PAY THE MORTGAGE OR RENT ON TIME?: NO

## 2024-03-13 SDOH — SOCIAL STABILITY: SOCIAL INSECURITY: DO YOU FEEL ANYONE HAS EXPLOITED OR TAKEN ADVANTAGE OF YOU FINANCIALLY OR OF YOUR PERSONAL PROPERTY?: NO

## 2024-03-13 SDOH — ECONOMIC STABILITY: INCOME INSECURITY: HOW HARD IS IT FOR YOU TO PAY FOR THE VERY BASICS LIKE FOOD, HOUSING, MEDICAL CARE, AND HEATING?: NOT HARD AT ALL

## 2024-03-13 ASSESSMENT — ACTIVITIES OF DAILY LIVING (ADL)
BATHING: INDEPENDENT
TOILETING: INDEPENDENT
HEARING - RIGHT EAR: FUNCTIONAL
HEARING - LEFT EAR: FUNCTIONAL
LACK_OF_TRANSPORTATION: NO
BATHING_ASSISTANCE: MODERATE
HOME_MANAGEMENT_TIME_ENTRY: 15
PATIENT'S MEMORY ADEQUATE TO SAFELY COMPLETE DAILY ACTIVITIES?: YES
GROOMING: INDEPENDENT
DRESSING YOURSELF: INDEPENDENT
WALKS IN HOME: INDEPENDENT
ADL_ASSISTANCE: INDEPENDENT
LACK_OF_TRANSPORTATION: NO
FEEDING YOURSELF: INDEPENDENT
JUDGMENT_ADEQUATE_SAFELY_COMPLETE_DAILY_ACTIVITIES: YES
ADL_ASSISTANCE: INDEPENDENT
ADEQUATE_TO_COMPLETE_ADL: YES

## 2024-03-13 ASSESSMENT — COLUMBIA-SUICIDE SEVERITY RATING SCALE - C-SSRS
1. IN THE PAST MONTH, HAVE YOU WISHED YOU WERE DEAD OR WISHED YOU COULD GO TO SLEEP AND NOT WAKE UP?: NO
6. HAVE YOU EVER DONE ANYTHING, STARTED TO DO ANYTHING, OR PREPARED TO DO ANYTHING TO END YOUR LIFE?: NO
1. IN THE PAST MONTH, HAVE YOU WISHED YOU WERE DEAD OR WISHED YOU COULD GO TO SLEEP AND NOT WAKE UP?: NO
2. HAVE YOU ACTUALLY HAD ANY THOUGHTS OF KILLING YOURSELF?: NO
2. HAVE YOU ACTUALLY HAD ANY THOUGHTS OF KILLING YOURSELF?: NO

## 2024-03-13 ASSESSMENT — PAIN SCALES - GENERAL
PAINLEVEL_OUTOF10: 7
PAINLEVEL_OUTOF10: 0 - NO PAIN
PAINLEVEL_OUTOF10: 9
PAINLEVEL_OUTOF10: 8
PAINLEVEL_OUTOF10: 9
PAINLEVEL_OUTOF10: 8
PAINLEVEL_OUTOF10: 4
PAINLEVEL_OUTOF10: 8
PAINLEVEL_OUTOF10: 8
PAINLEVEL_OUTOF10: 5 - MODERATE PAIN
PAINLEVEL_OUTOF10: 9
PAINLEVEL_OUTOF10: 6
PAINLEVEL_OUTOF10: 9
PAINLEVEL_OUTOF10: 8
PAINLEVEL_OUTOF10: 2
PAINLEVEL_OUTOF10: 8
PAINLEVEL_OUTOF10: 9
PAINLEVEL_OUTOF10: 8
PAINLEVEL_OUTOF10: 7
PAINLEVEL_OUTOF10: 3
PAINLEVEL_OUTOF10: 2
PAINLEVEL_OUTOF10: 9
PAINLEVEL_OUTOF10: 9
PAINLEVEL_OUTOF10: 3

## 2024-03-13 ASSESSMENT — ENCOUNTER SYMPTOMS
ACTIVITY CHANGE: 1
PSYCHIATRIC NEGATIVE: 1
ALLERGIC/IMMUNOLOGIC NEGATIVE: 1
GASTROINTESTINAL NEGATIVE: 1
ENDOCRINE NEGATIVE: 1
CARDIOVASCULAR NEGATIVE: 1
RESPIRATORY NEGATIVE: 1
EYES NEGATIVE: 1
HEMATOLOGIC/LYMPHATIC NEGATIVE: 1

## 2024-03-13 ASSESSMENT — COGNITIVE AND FUNCTIONAL STATUS - GENERAL
MOBILITY SCORE: 14
TURNING FROM BACK TO SIDE WHILE IN FLAT BAD: A LOT
DAILY ACTIVITIY SCORE: 24
PATIENT BASELINE BEDBOUND: NO
MOVING TO AND FROM BED TO CHAIR: A LOT
STANDING UP FROM CHAIR USING ARMS: A LITTLE
WALKING IN HOSPITAL ROOM: A LOT
MOBILITY SCORE: 24
DRESSING REGULAR UPPER BODY CLOTHING: A LITTLE
TOILETING: A LOT
DAILY ACTIVITIY SCORE: 15
PERSONAL GROOMING: A LITTLE
HELP NEEDED FOR BATHING: A LOT
MOVING FROM LYING ON BACK TO SITTING ON SIDE OF FLAT BED WITH BEDRAILS: A LITTLE
DRESSING REGULAR LOWER BODY CLOTHING: TOTAL
CLIMB 3 TO 5 STEPS WITH RAILING: A LOT

## 2024-03-13 ASSESSMENT — PAIN - FUNCTIONAL ASSESSMENT
PAIN_FUNCTIONAL_ASSESSMENT: 0-10
PAIN_FUNCTIONAL_ASSESSMENT: CPOT (CRITICAL CARE PAIN OBSERVATION TOOL)
PAIN_FUNCTIONAL_ASSESSMENT: 0-10

## 2024-03-13 ASSESSMENT — LIFESTYLE VARIABLES
SKIP TO QUESTIONS 9-10: 1
HOW OFTEN DO YOU HAVE 6 OR MORE DRINKS ON ONE OCCASION: NEVER
SKIP TO QUESTIONS 9-10: 1
PRESCIPTION_ABUSE_PAST_12_MONTHS: NO
HOW OFTEN DO YOU HAVE A DRINK CONTAINING ALCOHOL: NEVER
AUDIT-C TOTAL SCORE: 0
SUBSTANCE_ABUSE_PAST_12_MONTHS: NO
HOW MANY STANDARD DRINKS CONTAINING ALCOHOL DO YOU HAVE ON A TYPICAL DAY: PATIENT DOES NOT DRINK
SKIP TO QUESTIONS 9-10: 1
AUDIT-C TOTAL SCORE: 0
SUBSTANCE_ABUSE_PAST_12_MONTHS: NO
HOW OFTEN DO YOU HAVE A DRINK CONTAINING ALCOHOL: NEVER
AUDIT-C TOTAL SCORE: 0
SKIP TO QUESTIONS 9-10: 1
AUDIT-C TOTAL SCORE: 0
AUDIT-C TOTAL SCORE: 0
HOW OFTEN DO YOU HAVE 6 OR MORE DRINKS ON ONE OCCASION: NEVER
PRESCIPTION_ABUSE_PAST_12_MONTHS: NO
HOW MANY STANDARD DRINKS CONTAINING ALCOHOL DO YOU HAVE ON A TYPICAL DAY: PATIENT DOES NOT DRINK
AUDIT-C TOTAL SCORE: 0

## 2024-03-13 ASSESSMENT — PAIN DESCRIPTION - DESCRIPTORS
DESCRIPTORS: DULL;ACHING
DESCRIPTORS: ACHING

## 2024-03-13 ASSESSMENT — PATIENT HEALTH QUESTIONNAIRE - PHQ9
1. LITTLE INTEREST OR PLEASURE IN DOING THINGS: NOT AT ALL
2. FEELING DOWN, DEPRESSED OR HOPELESS: NOT AT ALL
SUM OF ALL RESPONSES TO PHQ9 QUESTIONS 1 & 2: 0
1. LITTLE INTEREST OR PLEASURE IN DOING THINGS: NOT AT ALL
SUM OF ALL RESPONSES TO PHQ9 QUESTIONS 1 & 2: 0
2. FEELING DOWN, DEPRESSED OR HOPELESS: NOT AT ALL

## 2024-03-13 ASSESSMENT — PAIN DESCRIPTION - LOCATION
LOCATION: KNEE
LOCATION: KNEE

## 2024-03-13 ASSESSMENT — PAIN DESCRIPTION - ORIENTATION: ORIENTATION: RIGHT

## 2024-03-13 NOTE — SIGNIFICANT EVENT
MEDICATED BY ANESTHESIA FOR PAIN UPON ARRIVAL TO THE PACU. ICE PLACED ON RIGHT KNEE. RIGHT DP. PLUS 2. FOOT COOL AND MOBILE WITH GOOD CAP. REFILL. COLOR AND SENSATION NORMAL.

## 2024-03-13 NOTE — OP NOTE
Arthroplasty Total Knee (R) Operative Note     Date: 3/13/2024  OR Location: [facility]    Name: Jessy Mcdaniel, : 1945, Age: 78 y.o., MRN: [id number], Sex: female    Diagnosis  Pre-op Diagnosis     * Osteoarthritis of right knee, unspecified osteoarthritis type [M17.11] Post-op Diagnosis     * Osteoarthritis of right knee, unspecified osteoarthritis type [M17.11]     Procedures  Arthroplasty Total Knee  83653 - [place] ARTHRP KNE CONDYLE&PLATU MEDIAL&LAT COMPARTMENTS      Surgeons      * Nic Guerra - Primary    Resident/Fellow/Other Assistant:  Surgeon(s) and Role:    Procedure Summary  Anesthesia: General  ASA: III  Anesthesia Staff: Anesthesiologist: Leeroy España DO  CRNA: LIAM Ventura-CRNA  Estimated Blood Loss: 150 mL  Intra-op Medications:   Administrations occurring from 0745 to 1030 on 24:   Medication Name Total Dose   vancomycin (Vancocin) vial for injection 1 g   tranexamic acid (Cyklokapron) injection 1 g   thrombin solution 10,000 Units   fentaNYL PF (Sublimaze) injection 50 mcg 50 mcg   midazolam (Versed) injection 2 mg 2 mg              Anesthesia Record               Intraprocedure I/O Totals          Intake    LR infusion 1000.00 mL    Total Intake 1000 mL          Specimen: No specimens collected     Staff:   Circulator: Anisa Mcdowell RN; Anca Hart RN  Relief Circulator: Tara Ugalde RN  Scrub Person: Fidelina Sanchez; Fara Marks RN         Drains and/or Catheters: * None in log *    Tourniquet Times:     Total Tourniquet Time Documented:  Thigh (Right) - 70 minutes  Total: Thigh (Right) - 70 minutes      Implants:  Implants       Type Name Action Serial No.      Implant CEMENT, BONE, SIMPLEX HV FULL DOSE  40 GM - [id number] Implanted      Implant CEMENT, BONE, SIMPLEX HV FULL DOSE  40 GM - [id number] Implanted      Implant CEMENT, SCULPS KIT, CONCISE, DISPOSABLE - [id number] Implanted      Joint Knee FEMORAL, ATTUNE PS, CARISSA, SZ 5, RT - [id number] Implanted       Joint Knee INSERT, ATTUNE PS FB, SZ 5, 5MM - [id number] Implanted       SIZE 6 ATTUNE FIXED BEARING TIBIAL BASE, CEMENTED DUPLICATE, PLEASE TRANSITION TO CAT# [id number] Implanted      Joint Knee DOME, PATELLA, MEDIALIZED, 41MM - [id number] Implanted      Implant CEMENT, BONE, SIMPLEX HV FULL DOSE  40 GM - [id number] Implanted               Findings: See procedure details below    Indications: Jessy Mcdaniel is an 78 y.o. female who is having surgery for Osteoarthritis of right knee, unspecified osteoarthritis type [M17.11].  Options are discussed with the patient in detail. The patient states that this right knee pain has recurred that it is disabling and impairs the patient´s ability to walk and do other activities that are important. AP and lateral x-rays show osteoarthritis of the right knee that is worse at the medial compartment with loss of joint surface cartilage, bone on bone and sclerosis. Right total knee replacement with indications, alternatives, potential risks including but not limited to blood loss, blood clot, nerve damage, infection, death and stroke, benefits, unforseen risks, the rehab involved and the fact that no guarantee can be made were all discussed with the patient in detail. The patient understands, accepts and wishes to proceed because of the persistent right knee pain and the fact that activity modification, gentle strengthening and ROM exercises, physical therapy, Tylenol, ibuprofen, cortisone injections and visco supplementation did not relieve the right knee pain and because this right knee pain impairs the patient´s ability to complete the normal activities of daily living . I agree.     The patient was seen in the preoperative area. The risks, benefits, complications, treatment options, non-operative alternatives, expected recovery and outcomes were all again discussed with the patient. The possibilities of reaction to medication, pulmonary aspiration, injury to surrounding structures,  bleeding, recurrent infection, the need for additional procedures, failure to diagnose a condition, and creating a complication requiring transfusion or operation were all again discussed with the patient. The patient concurred with the proposed plan, giving informed consent.  The site of surgery was properly noted/marked if necessary per policy. The patient has been actively warmed in preoperative area. Preoperative antibiotics have been ordered and given within 1 hours of incision. Venous thrombosis prophylaxis have been ordered including unilateral sequential compression device    Procedure Details: The patient was taken to the operating room and was placed under general anesthesia without complications. Tourniquet was placed around the right thigh. The right knee was examined under anesthesia and there was loss of extension and also instability with stressing. The right knee was then prepped using triple prep of chlorhexidine, alcohol and ChloraPrep and after allowing the prep to dry was draped in the usual sterile manner. Time-out was done. The right lower extremity was exsanguinated using an Esmarch bandage and the tourniquet was inflated. I first made an anterior midline incision. I carried the incision down through skin and subcutaneous tissue. Throughout the entire procedure hemostasis was achieved using the Bovie and throughout the entire procedure the knee was irrigated with saline. The knee was soaked in Irrisept solution for 2 minutes and then irrigated until clear. The knee joint was entered via a median parapatellar arthrotomy incision. Immediately I noted that there was severe osteoarthritis of the right knee with complete loss of joint surface cartilage, sclerosis and bone-on-bone at the medial compartment and also at the patellofemoral compartment. There was also wearing of the lateral joint surface cartilage. I first balanced the soft tissues medially and laterally and then protected the medial  and lateral collateral ligaments throughout the entire procedure with Kendall retractors. I next removed the remnants of the medial and lateral menisci and anterior cruciate ligament. The femoral canal was found using an intramedullary drill. I made the femoral osteotomy at 10 millimeters and 5 degrees valgus. Using the anterior down Sizer I measured the femur at size 5. After making alignment marks in the femur and after checking with an Richardson wing to be sure that I would not notch the femur I made the anterior, posterior and chamfer osteotomies along the lines of the size 5 femoral cutting template. I made the notch osteotomy using the size 5 femoral notch cutting template. I then protected the posterior structures with a 2 prong PCL retractor. I made tibial osteotomy along the lines of the tibial cutting template and measured the tibia at size 6. I placed 5 and 6 millimeter fixed bearing stabilized tibial insert trials and there was full extension and excellent stability to anterior-posterior and valgus-varus stressing with the 5 millimeter stabilized insert trial.  6 mm stabilized tibial trial could not be reduced.  With the knee in extension I marked alignment marks at the anterior tibia. I then measured the patella at 41 millimeters and using the patellar Reamer reamed away the arthritic surface of the patella. I drilled 3 peg holes in the patella along the lines of the 41 millimeter guide. I snapped the trial oval dome 3 peg 41 millimeter patella trial onto the patella. I placed the knee through range of motion and range of motion was from 0-125 degrees. There was excellent stability to anterior-posterior and valgus-varus stressing at 0 degrees, 30 degrees, 45 degrees, 60 degrees, 90 degrees and 120 degrees. The patella stayed in the midline with range of motion. I then removed all trials. I again placed the size [5] tibial trial onto the tibial along the alignment marks and reamed and then impacted the tibial  along the lines of the size 6 tibial trial. I removed the tibial trial. I then soaked the knee in Irrisept solution for 3 minutes and then irrigated the femoral, tibial and patellar surfaces until clear using the pulse lavage. We then dried the bony surfaces of the femur, tibia and patella. Two packets of simplex HV cement were mixed with 1 gram of vancomycin. When the cement was of the proper consistency I placed the cement over the tibial surface and impacted the size 6 attune fixed bearing tibial tray onto the tibia. There was excellent seating and excess cement was removed using a knife and curette. I then placed cement over the bony surfaces of the femur and the posterior condyles of the femoral prosthesis and impacted the size 5 right attune posterior stabilized femoral prosthesis on to the femur. There was excellent seating and excess cement was removed using a knife and curette. I then irrigated the knee with a pulse lavage and then impacted the size 5-5 millimeter fixed bearing stabilized attune tibial insert onto the tibial tray. There was excellent seating. The knee was then held in extension until the cement hardened completely. I placed cement over the bony surface of the patella and snapped the 41 millimeter oval dome 3 peg patella onto the patella. There was excellent seating and the patellar spur prosthesis was held with the patellar clamp until the cement hardened completely. The knee was held in extension until the cement hardened completely. The knee was then soaked in Irrisept solution for 2 minutes and then irrigated until clear. The tourniquet was deflated and the knee was irrigated with saline and hemostasis was achieved using the Bovie. I made a thorough examination for any loose fragments of bone or cement and no loose fragments of bone or cement were seen. I then placed the knee through a range of motion and range of motion was from 0-125 degrees. The patella stayed in the midline with  range of motion. The right knee was stable to anterior-posterior and valgus-varus stressing at 0 degrees 30 degrees 45 degree 60 degrees 90 degrees and 120 degrees. The medial and lateral collateral ligaments were palpated and noted to be intact. The fascia was closed using interrupted and running 1. Vicryl suture. Subcutaneous tissue was closed using running 2 0 Vicryl. Skin was closed using running subcuticular Monocryl. Sterile Mepilex dressing was applied and the patient returned to the PACU in stable condition.  Complications:  None; patient tolerated the procedure well.    Disposition: PACU - hemodynamically stable.  Condition: stable         Additional Details: None    Attending Attestation: I performed the procedure.    Nic Guerra  Phone Number: [phone]

## 2024-03-13 NOTE — PROGRESS NOTES
Occupational Therapy    Evaluation/Treatment    Patient Name: Jessy Mcdaniel  MRN: [id number]  : 1945  Today's Date: 24  Time Calculation  Start Time: 1447  Stop Time: 1520  Time Calculation (min): 33 min       Assessment:  OT Assessment: OT order received, chart reviewed, evaluation completed. Pt demonstrated deficits in ADLsand functional mobility following knee surgery and would benefit from acute OT services.  Evaluation/Treatment Tolerance: Patient limited by fatigue  Medical Staff Made Aware: Yes  End of Session Communication: Bedside nurse  End of Session Patient Position: Bed, 3 rail up, Alarm on  OT Assessment Results: Decreased ADL status, Decreased upper extremity strength, Decreased safe judgment during ADL, Decreased cognition, Decreased endurance, Decreased functional mobility, Decreased gross motor control, Decreased IADLs  Evaluation/Treatment Tolerance: Patient limited by fatigue  Medical Staff Made Aware: Yes  Plan:  Treatment Interventions: ADL retraining, Functional transfer training, UE strengthening/ROM, Endurance training, Patient/family training, Equipment evaluation/education  OT Frequency: 5 times per week  OT Discharge Recommendations: Moderate intensity level of continued care  Equipment Recommended upon Discharge: Wheeled walker  OT - OK to Discharge: Yes  Treatment Interventions: ADL retraining, Functional transfer training, UE strengthening/ROM, Endurance training, Patient/family training, Equipment evaluation/education    Subjective     General:   OT Received On: 24  General  Reason for Referral: activities of daily living, R knee replacement  Referred By: Dr Charlie LAZO  Past Medical History Relevant to Rehab: SHANDA, A flutter, HTN, hyperlipidemia, hyopthyroid, osteoporosis, knee meniscus tear, nuclear sclerosis.  Co-Treatment: PT  Co-Treatment Reason: initial post op evaluation, need for second skilled therapist for safe patient handling  Prior to Session  Communication: Bedside nurse  Patient Position Received: Bed, 3 rail up, Alarm off, not on at start of session  Preferred Learning Style: verbal  General Comment: Pt is a 79 yo woman admit for elective R knee replacement  Precautions:  Hearing/Visual Limitations: glasses  LE Weight Bearing Status: Weight Bearing as Tolerated  Medical Precautions: Fall precautions  Post-Surgical Precautions: Right total knee precautions  Precautions Comment: Pt educated on knee precautions  Vital Signs:     Pain:  Pain Assessment  Pain Assessment: 0-10  Pain Score: 9  Pain Type: Surgical pain  Pain Location: Knee  Pain Orientation: Right    Objective   Cognition:  Overall Cognitive Status: Within Functional Limits (though feeling groggy from surgery)  Arousal/Alertness:  (reporting feeling groggy from surgery still, nauseated)  Orientation Level: Oriented X4  Processing Speed: Delayed     Home Living:  Type of Home: House  Lives With: Spouse  Home Adaptive Equipment: None  Home Layout: One level, Laundry in basement  Home Access: Stairs to enter with rails  Entrance Stairs-Rails: Both  Entrance Stairs-Number of Steps: 4  Bathroom Shower/Tub: Tub/shower unit  Bathroom Equipment: None  Home Living Comments: Spouse home and able to assist  Prior Function:  Level of [place]: Independent with ADLs and functional transfers, Independent with homemaking with ambulation  ADL Assistance: Independent  Homemaking Assistance: Independent  Ambulatory Assistance: Independent  Vocational: Retired  Prior Function Comments: indep no device denies falls  IADL History:     ADL:  Eating Assistance: Independent  Grooming Assistance: Minimal  Bathing Assistance: Moderate  Bathing Deficit:  (for lower body)  UE Dressing Assistance: Minimal  LE Dressing Assistance: Total  LE Dressing Deficit: Don/doff R sock, Don/doff L sock  Toileting Assistance with Device: Maximal  Toileting Deficit: Use of bedpan/urinal setup, Perineal hygiene, Clothing management  down, Clothing management up (assist for bedpan placement/removal, assist for chantell hygiene)    Activity Tolerance:  Endurance: Decreased tolerance for upright activites  Activity Tolerance Comments: nausea and dry heaving  Rate of Perceived Exertion (RPE): 5/10  Functional Standing Tolerance:     Bed Mobility/Transfers: Bed Mobility  Bed Mobility: Yes  Bed Mobility 1  Bed Mobility 1: Supine to sitting  Level of Assistance 1: Moderate assistance  Bed Mobility Comments 1: HOB elevated and use of rail. Pt required mod A for B LEs and trunk up and extended time for scoot to EOB. Pt required rest break due to nausea and dry heaves.  Bed Mobility 2  Bed Mobility  2: Rolling right, Rolling left  Level of Assistance 2: Minimum assistance  Bed Mobility Comments 2: min A for rolling R, L for bedpan placement, pt able to bridge hips slightly  Bed Mobility 3  Bed Mobility 3: Sitting to supine  Level of Assistance 3: Moderate assistance (x1-2)  Bed Mobility Comments 3: Pt required assist for BLEs and trunk into supine dep scoot to HOB    Transfers  Transfer: Yes  Transfer 1  Transfer From 1: Bed to  Transfer to 1: Stand  Technique 1: Sit to stand  Transfer Device 1: Walker  Transfer Level of Assistance 1: Moderate assistance  Trials/Comments 1: Pt stood from bed with mod A and Rw support with cues for safe hand placement.  Transfers 2  Transfer From 2: Stand to  Transfer to 2: Sit  Technique 2: Stand to sit  Transfer Device 2: Walker  Transfer Level of Assistance 2: Moderate assistance  Trials/Comments 2: VCs for safe hand placement and mod A to control descent onto EOB.      Functional Mobility:  Functional Mobility  Functional Mobility Performed: Yes  Functional Mobility 1  Surface 1: Level tile  Device 1: Rolling walker  Assistance 1: Moderate assistance  Comments 1: Pt able to take several steps towards HOB with Mod A and assist for RW management with cues for sequencing    Strength:  Strength Comments: 3/5 B  UEs    Coordination:  Movements are Fluid and Coordinated: Yes (for upper body)   Hand Function:  Hand Function  Gross Grasp: Functional  Coordination: Functional  Extremities: RUE   RUE : Within Functional Limits and LUE   LUE: Within Functional Limits    Outcome Measures: [facility] Daily Activity  Putting on and taking off regular lower body clothing: Total  Bathing (including washing, rinsing, drying): A lot  Putting on and taking off regular upper body clothing: A little  Toileting, which includes using toilet, bedpan or urinal: A lot  Taking care of personal grooming such as brushing teeth: A little  Eating Meals: None  Daily Activity - Total Score: 15        Education Documentation  Body Mechanics, taught by Carol Velasquez OT at 3/13/2024  3:50 PM.  Learner: Patient  Readiness: Acceptance  Method: Explanation  Response: Verbalizes Understanding  Comment: Pt educated on knee precautions    Precautions, taught by Carol Velasquez OT at 3/13/2024  3:50 PM.  Learner: Patient  Readiness: Acceptance  Method: Explanation  Response: Verbalizes Understanding  Comment: Pt educated on knee precautions    ADL Training, taught by Carol Velasquez OT at 3/13/2024  3:50 PM.  Learner: Patient  Readiness: Acceptance  Method: Explanation  Response: Verbalizes Understanding  Comment: Pt educated on knee precautions    Education Comments  No comments found.        OP EDUCATION:       Goals:  Encounter Problems       Encounter Problems (Active)       OT Goals       ADLs (Progressing)       Start:  03/13/24    Expected End:  03/30/24       Pt will complete ADL tasks with Mod I, using AE as needed, in order to complete self-care tasks.           Functional mobility (Progressing)       Start:  03/13/24    Expected End:  03/30/24       Pt will perform functional mobility household distance at mod ind level with RW           Functional transfers (Progressing)       Start:  03/13/24    Expected End:  03/30/24       Pt will perform functional  transfers at mod ind level with RW

## 2024-03-13 NOTE — ANESTHESIA PREPROCEDURE EVALUATION
Patient: Jessy Mcdaniel    Procedure Information       Date/Time: 03/13/24 0745    Procedure: Arthroplasty Total Knee (Right: Knee)    Location: [facility]  / [facility]    Surgeons: Nic Guerra MD            Relevant Problems   Anesthesia (within normal limits)      Cardiovascular   (+) Atypical atrial flutter (CMS/HCC)   (+) Chronic atrial fibrillation (CMS/HCC)   (+) Essential hypertension   (+) Hyperlipidemia   (+) PAC (premature atrial contraction)      Endocrine   (+) Hypothyroidism      Pulmonary   (+) SHANDA (obstructive sleep apnea)      Musculoskeletal   (+) Osteoarthritis of right knee       Clinical information reviewed:   Tobacco  Allergies  Meds   Med Hx  Surg Hx   Fam Hx  Soc Hx        NPO Detail:  NPO/Void Status  Carbohydrate Drink Given Prior to Surgery? : N  Date of Last Liquid: 03/12/24  Time of Last Liquid: 1800  Date of Last Solid: 03/12/24  Time of Last Solid: 1800  Time of Last Void: 0600         Physical Exam    Airway  Mallampati: II  TM distance: >3 FB  Neck ROM: full     Cardiovascular    Dental    Pulmonary    Abdominal            Anesthesia Plan    History of general anesthesia?: yes  History of complications of general anesthesia?: no    ASA 3     general and regional     The patient is not a current smoker.  Patient was not previously instructed to abstain from smoking on day of procedure.  Patient did not smoke on day of procedure.  Education provided regarding risk of obstructive sleep apnea.  intravenous induction   Postoperative administration of opioids is intended.  Anesthetic plan and risks discussed with patient.  Use of blood products discussed with patient who consented to blood products.    Plan discussed with CRNA and CAA.

## 2024-03-13 NOTE — ANESTHESIA PROCEDURE NOTES
Airway  Date/Time: 3/13/2024 8:00 AM  Urgency: elective    Airway not difficult    Staffing  Performed: CRNA   Authorized by: Leeroy España DO    Performed by: LIAM Ventura-CRNA  Patient location during procedure: OR    Indications and Patient Condition  Indications for airway management: anesthesia  Spontaneous Ventilation: absent  Sedation level: deep  Preoxygenated: yes  Patient position: sniffing  Mask difficulty assessment: 0 - not attempted  Planned trial extubation    Final Airway Details  Final airway type: supraglottic airway      Successful airway: classic  Size 4     Number of attempts at approach: 1

## 2024-03-13 NOTE — PROGRESS NOTES
Physical Therapy    Physical Therapy Evaluation & Treatment    Patient Name: Jessy Mcdaniel  MRN: [id number]  Today's Date: 3/13/2024   Time Calculation  Start Time: 1447  Stop Time: 1515  Time Calculation (min): 28 min    Assessment/Plan   PT Assessment  PT Assessment Results: Decreased strength, Decreased range of motion, Impaired balance, Decreased mobility, Decreased endurance, Decreased safety awareness, Pain  Rehab Prognosis: Good  Strengths: Support and attitude of living partners, Premorbid level of function, Housing layout  Barriers to Participation: Comorbidities, Access to adaptive/assistive products  Assessment Comment: Pt presented with decreased muscular strength/endurance, impaired R knee AROM, decreased safety awareness, and increased assist required for functional mobility. She would benefit from continued skilled PT services for maximizing independence and safety with functional mobility prior to and after discharge.  End of Session Patient Position: Bed, 3 rail up, Alarm off, not on at start of session (call light and needs within reach)   IP OR SWING BED PT PLAN  Inpatient or Swing Bed: Inpatient  PT Plan  Treatment/Interventions: Bed mobility, Transfer training, Gait training, Stair training, Balance training, Strengthening, Range of motion, Therapeutic exercise, Therapeutic activity, Home exercise program  PT Plan: Skilled PT  PT Frequency: BID  PT Discharge Recommendations: Moderate intensity level of continued care  Equipment Recommended upon Discharge: Wheeled walker  PT Recommended Transfer Status: Assist x1 (FWW)  PT - OK to Discharge: Yes (with continued skilled PT services at next level of care)      Subjective   General Visit Information:  General  Reason for Referral: impaired functional mobility. This 78 year old female was admitted for a planned surgery and is now s/p R TKA by Dr. Guerra.  Past Medical History Relevant to Rehab: A-fib, A-flutter, colon cancer, HTN, OA, osteoporosis,  premature atrial contraction, sleep apnea (on CPAP), L knee menisectomy & Baker's cyst removal, bilat breast biopsy  Co-Treatment: OT  Co-Treatment Reason: for maximal safety with functional mobility  Prior to Session Communication: Bedside nurse (RN cleared pt for participation.)  Patient Position Received: Bed, 3 rail up, Alarm off, not on at start of session  General Comment: Pt agreed to session and participated fully despite c/o grogginess and nausea after surgery.    Home Living:  Home Living  Type of Home: House  Lives With: Spouse  Home Adaptive Equipment: Crutches  Home Layout: One level, Laundry in basement  Home Access: Stairs to enter with rails  Entrance Stairs-Rails: Both  Entrance Stairs-Number of Steps: 4 (back door: 2 stairs without rails)  Bathroom Shower/Tub: Tub/shower unit  Bathroom Equipment: None  Home Living Comments: Spouse retired and able to provide some physical assist    Prior Level of Function:  Prior Function Per Pt/Caregiver Report  Level of [place]: Independent with ADLs and functional transfers, Independent with homemaking with ambulation  ADL Assistance: Independent  Homemaking Assistance: Independent  Ambulatory Assistance: Independent (No assistive device. Indep on stairs. Denied any falls within the past 6 months.)  Vocational: Retired  Prior Function Comments: able to drive but prefers not to,  provides transportation    Precautions:  Precautions  Hearing/Visual Limitations: wears bifocals; hearing WFL  LE Weight Bearing Status: Weight Bearing as Tolerated (RLE)  Medical Precautions: Fall precautions, Oxygen therapy device and L/min (3 L/min IO2 via NC)  Post-Surgical Precautions: Right total knee precautions (education provided)      Objective   Pain:  Pain Assessment  Pain Assessment: 0-10  Pain Score: 9  Pain Type: Surgical pain  Pain Location: Knee  Pain Orientation: Right  Pain Interventions: Ambulation/increased activity,  Repositioned    Cognition:  Cognition  Overall Cognitive Status: Within Functional Limits    General Assessments:  Activity Tolerance  Endurance: Decreased tolerance for upright activites (due to c/o nausea and few instances of dry heaves)    Sensation  Light Touch: Not tested  Sensation Comment: denied paresthesias    Strength  Strength Comments: Based on observation of mobility, BLE grossly >/=3+/5 exept R quad >/=3-/5 as expected post-op    Perception  Motor Planning: Appears intact    Coordination  Movements are Fluid and Coordinated: Yes (but with slowed movements of RLE)    Postural Control  Postural Control: Within Functional Limits  Posture Comment: fwd head posture with protracted shoulders    Static Sitting Balance  Static Sitting-Balance Support: Feet supported, No upper extremity supported  Static Sitting-Level of Assistance: Close supervision  Dynamic Sitting Balance  Dynamic Sitting-Balance Support: No upper extremity supported, Bilateral upper extremity supported  Dynamic Sitting-Comments: close S    Static Standing Balance  Static Standing-Balance Support: Bilateral upper extremity supported  Static Standing-Comment/Number of Minutes: Vazquez x2  Dynamic Standing Balance  Dynamic Standing-Balance Support: Bilateral upper extremity supported  Dynamic Standing-Comments: Vazquez x2    Functional Assessments:  Bed Mobility  Bed Mobility: Yes  Bed Mobility 1  Bed Mobility 1: Supine to sitting  Level of Assistance 1: Moderate assistance (assist to RLE and trunk; cued for sequencing)  Bed Mobility Comments 1: HOB elevated and used L bed rail  Bed Mobility 2  Bed Mobility  2: Sitting to supine  Level of Assistance 2: Moderate assistance (assist to RLEand trunk)  Bed Mobility Comments 2: bed flat and no rail; toward L side  Bed Mobility 3  Bed Mobility 3: Rolling right, Rolling left  Level of Assistance 3: Minimum assistance  Bed Mobility Comments 3: for bed pan placement and hygiene    Transfers  Transfer:  Yes  Transfer 1  Technique 1: Sit to stand  Transfer Device 1: Walker  Transfer Level of Assistance 1: Moderate assistance (assist for lifting torque; cued for RLE position and walker safety/BUE placement)  Trials/Comments 1: x1 trial at EOB  Transfers 2  Technique 2: Stand to sit  Transfer Device 2: Walker  Transfer Level of Assistance 2: Moderate assistance (assist for increased eccentric control; cued for RLE position and walker safety/BUE placement)  Trials/Comments 2: x1 trial at EOB    Ambulation/Gait Training  Ambulation/Gait Training Performed: Yes  Ambulation/Gait Training 1  Surface 1: Level tile  Device 1: Rolling walker  Assistance 1:  (Vazquez x2 for steadying and intermittent walker movement; cued for sequencing)  Comments/Distance (ft) 1: 4 lateral steps toward L side at EOB. Slow velocity and able to clear bilateral feet. Steady gait.    Extremity/Trunk Assessments:  RLE   RLE : Within Functional Limits (except decreased knee AROM as anticipated post-op)  LLE   LLE : Within Functional Limits    Treatments:  Therapeutic Exercise  Therapeutic Exercise Performed: Yes (seated EOB)  Therapeutic Exercise Activity 1: bilat AP x15  Therapeutic Exercise Activity 2: R LAQ x10 AAROM    Bed Mobility  Bed Mobility: Yes  Bed Mobility 1  Bed Mobility 1: Supine to sitting  Level of Assistance 1: Moderate assistance (assist to RLE and trunk; cued for sequencing)  Bed Mobility Comments 1: HOB elevated and used L bed rail  Bed Mobility 2  Bed Mobility  2: Sitting to supine  Level of Assistance 2: Moderate assistance (assist to RLEand trunk)  Bed Mobility Comments 2: bed flat and no rail; toward L side  Bed Mobility 3  Bed Mobility 3: Rolling right, Rolling left  Level of Assistance 3: Minimum assistance  Bed Mobility Comments 3: for bed pan placement and hygiene    Ambulation/Gait Training  Ambulation/Gait Training Performed: Yes  Ambulation/Gait Training 1  Surface 1: Level tile  Device 1: Rolling walker  Assistance 1:   (Vazquez x2 for steadying and intermittent walker movement; cued for sequencing)  Comments/Distance (ft) 1: 4 lateral steps toward L side at EOB. Slow velocity and able to clear bilateral feet. Steady gait.    Transfers  Transfer: Yes  Transfer 1  Technique 1: Sit to stand  Transfer Device 1: Walker  Transfer Level of Assistance 1: Moderate assistance (assist for lifting torque; cued for RLE position and walker safety/BUE placement)  Trials/Comments 1: x1 trial at EOB  Transfers 2  Technique 2: Stand to sit  Transfer Device 2: Walker  Transfer Level of Assistance 2: Moderate assistance (assist for increased eccentric control; cued for RLE position and walker safety/BUE placement)  Trials/Comments 2: x1 trial at EOB    Outcome Measures:  [facility] Basic Mobility  Turning from your back to your side while in a flat bed without using bedrails: A little  Moving from lying on your back to sitting on the side of a flat bed without using bedrails: A lot  Moving to and from bed to chair (including a wheelchair): A lot  Standing up from a chair using your arms (e.g. wheelchair or bedside chair): A little  To walk in hospital room: A lot  Climbing 3-5 steps with railing: A lot  Basic Mobility - Total Score: 14    Encounter Problems       Encounter Problems (Active)       Mobility       STG - Patient will ambulate >/=100 ft with FWW at mod I level.       Start:  03/13/24    Expected End:  03/26/24            STG - Patient will ascend and descend 4 stairs with bilateral rails and close S.       Start:  03/13/24    Expected End:  03/26/24               PT Transfers       STG - Patient to transfer supine<>sit at mod I level.       Start:  03/13/24    Expected End:  03/26/24            STG - Patient will transfer sit<>stand with FWW at mod I level.       Start:  03/13/24    Expected End:  03/26/24                   Education Documentation  Precautions, taught by Katelynn Hartley PT at 3/13/2024  3:55 PM.  Learner: Patient  Readiness:  Eager  Method: Explanation  Response: Needs Reinforcement, Demonstrated Understanding    Home Exercise Program, taught by Katelynn Hartley, PT at 3/13/2024  3:55 PM.  Learner: Patient  Readiness: Eager  Method: Explanation  Response: Needs Reinforcement, Demonstrated Understanding    Mobility Training, taught by Katelynn Hartley, PT at 3/13/2024  3:55 PM.  Learner: Patient  Readiness: Eager  Method: Explanation  Response: Needs Reinforcement, Demonstrated Understanding    Education Comments  No comments found.

## 2024-03-13 NOTE — NURSING NOTE
Went into room to medicate Jessy she is sleeping per spouse request do not wake pt call light in reach

## 2024-03-13 NOTE — NURSING NOTE
BSSR pt complains of nausea and pain 9/10 resting looks comfortable  spouse at bedside call light in reach

## 2024-03-13 NOTE — ANESTHESIA POSTPROCEDURE EVALUATION
Patient: Jessy Mcdaniel    Procedure Summary       Date: 03/13/24 Room / Location: [facility] OR 10 / Virtual [facility]    Anesthesia Start: 0755 Anesthesia Stop: 1035    Procedure: Arthroplasty Total Knee (Right: Knee) Diagnosis:       Osteoarthritis of right knee, unspecified osteoarthritis type      (Osteoarthritis of right knee, unspecified osteoarthritis type [M17.11])    Surgeons: Nic Guerra MD Responsible Provider: Leeroy España DO    Anesthesia Type: general, regional ASA Status: 3            Anesthesia Type: general, regional    Vitals Value Taken Time   /59 03/13/24 1034   Temp 36.1 °C (97 °F) 03/13/24 1034   Pulse 57 03/13/24 1034   Resp 14 03/13/24 1034   SpO2 99 % 03/13/24 1034       Anesthesia Post Evaluation    Patient location during evaluation: bedside  Patient participation: complete - patient participated  Level of consciousness: awake and alert  Pain management: adequate  Multimodal analgesia pain management approach  Airway patency: patent  Two or more strategies used to mitigate risk of obstructive sleep apnea  Cardiovascular status: acceptable  Respiratory status: acceptable  Hydration status: acceptable  Postoperative Nausea and Vomiting: none        No notable events documented.

## 2024-03-13 NOTE — CONSULTS
Inpatient consult to Medicine  Consult performed by: LIAM Carbajal-CNP  Consult ordered by: Nic Guerra MD          Reason For Consult  Post op management     History Of Present Illness  Jesys Mcdaniel is a 78 y.o. female, POD #0 Right total knee arthroplasty. She has a past medical history significant for atrial fibrillation/flutter, hypertension, hypothyroidism, SHANDA, OA, Osteoporosis.     She elected to undergo an elective right total knee arthroplasty after reaching a point of severe pain and disability.     The hospitalist service is being consulted for post op medical management of the aforementioned co-morbidities.      Past Medical History  She has a past medical history of Atrial fibrillation and flutter (CMS/HCC), Bilateral knee pain, Colon cancer (CMS/HCC), High cholesterol, Hypertension, Hypothyroidism, Osteoarthritis, Osteoporosis, Premature atrial contraction, and Sleep apnea.    Surgical History  She has a past surgical history that includes BI stereotactic guided breast right localization and biopsy (Right, 09/24/2014); BI stereotactic guided breast right localization and biopsy (Right, 11/27/2017); BI US guided breast localization and biopsy left (Left, 12/09/2022); Colon surgery (1992); Appendectomy; Hysterectomy; Knee arthroscopy w/ meniscectomy (Left); Cataract extraction w/ intraocular lens  implant, bilateral; Sullivan tooth extraction; and Colonoscopy.     Social History  She reports that she has never smoked. She has never been exposed to tobacco smoke. She has never used smokeless tobacco. She reports that she does not drink alcohol and does not use drugs.    Family History  Family History   Problem Relation Name Age of Onset    Coronary artery disease Mother      Heart disease Mother      Stroke Father          Allergies  Prochlorperazine edisylate and Prochlorperazine    Review of Systems  Review of Systems   Constitutional:  Positive for activity change.   HENT: Negative.      Eyes: Negative.    Respiratory: Negative.     Cardiovascular: Negative.    Gastrointestinal: Negative.    Endocrine: Negative.    Genitourinary: Negative.    Musculoskeletal:  Positive for gait problem.   Skin: Negative.    Allergic/Immunologic: Negative.    Hematological: Negative.    Psychiatric/Behavioral: Negative.     All other systems reviewed and are negative.       Physical Exam  Physical Exam  Vitals and nursing note reviewed.   Constitutional:       Appearance: Normal appearance. She is normal weight.   HENT:      Head: Normocephalic and atraumatic.   Eyes:      Extraocular Movements: Extraocular movements intact.      Pupils: Pupils are equal, round, and reactive to light.   Cardiovascular:      Rate and Rhythm: Normal rate.      Pulses: Normal pulses.      Heart sounds: Normal heart sounds.   Pulmonary:      Effort: Pulmonary effort is normal.      Breath sounds: Normal breath sounds.      Comments: 3 L of supplemental oxygen via nasal cannula, sats 100%  Abdominal:      General: Bowel sounds are normal.      Palpations: Abdomen is soft.   Musculoskeletal:      Cervical back: Normal range of motion and neck supple.      Comments: POD #0, right total knee replacement.  Range of motion on the lower extremity limited due to pain   Skin:     General: Skin is warm and dry.      Capillary Refill: Capillary refill takes less than 2 seconds.      Comments: Right knee dressing clean dry intact, wrapped with Ace wrap   Neurological:      General: No focal deficit present.      Mental Status: She is alert and oriented to person, place, and time.   Psychiatric:         Mood and Affect: Mood normal.         Behavior: Behavior normal.            Last Recorded Vitals  BP (!) 119/47 (BP Location: Left arm, Patient Position: Lying)   Pulse 66   Temp 36.7 °C (98.1 °F) (Oral)   Resp 16   SpO2 100%     Relevant Results  XR knee right 1-2 views    Result Date: 3/13/2024  Interpreted By:  Kevan Lucas, STUDY: XR  KNEE RIGHT 1-2 VIEWS; 3/13/2024 11:53 am   INDICATION: Signs/Symptoms:Post op knee.   COMPARISON: 01/19/2024   ACCESSION NUMBER(S): [id number]   ORDERING CLINICIAN: MAGEN DANIEL   TECHNIQUE: AP and lateral views of the right knee.   FINDINGS: Normal appearance of femoral, tibial, and retropatellar components. There are expected postoperative changes of right total knee prosthesis. Expected postoperative changes of the soft tissues. No abnormal radiopaque foreign body.       Expected postoperative changes of right total knee prosthesis.   Signed by: Kevan Lucas 3/13/2024 12:14 PM Dictation workstation:   [id number]       Assessment/Plan     POD #0 Right Toknee arthroplasty  As managed per primary service  PT OT  Incentive spirometer teaching  DVT prophylaxis  As needed pain management regimen  Is on oxygen however, her oxygen saturation is 100%.  Does not appear to need this. Does not use 02 at home. Communicate with nurse about weaning.    History of Atrial fibrillation, flutter  Continuous telemetry  Continue home flecainide  Low-dose Eliquis    Hypothyroidism  Continue home Synthroid    History of hypertension  Hold home Maxizde for now, hold home Lasix for now  Monitor vital signs    Obstructive sleep apnea  CPAP q. nightly, patient has brought in her own device    Osteoarthritis  As needed Tylenol, PT OT, fall precautions    DVT/GI  Low-dose Eliquis, PPI    Thank you for this consult. Will continue to follow.         Meliza Zuniga, APRN-CNP

## 2024-03-13 NOTE — ANESTHESIA PROCEDURE NOTES
Peripheral Block    Patient location during procedure: pre-op  Start time: 3/13/2024 7:50 AM  End time: 3/13/2024 7:51 AM  Reason for block: at surgeon's request and post-op pain management  Staffing  Performed: attending   Authorized by: Leeroy España DO    Performed by: Leeroy España DO  Preanesthetic Checklist  Completed: patient identified, IV checked, site marked, risks and benefits discussed, surgical consent, monitors and equipment checked, pre-op evaluation and timeout performed   Timeout performed at: 3/13/2024 7:45 AM  Peripheral Block  Patient position: laying flat  Prep: ChloraPrep  Patient monitoring: heart rate, cardiac monitor and continuous pulse ox  Block type: adductor canal  Laterality: right  Injection technique: single-shot  Guidance: ultrasound guided  Local infiltration: ropivacaine  Infiltration strength: 0.5 %  Dose: 30 mL  Needle  Needle gauge: 22 G  Needle length: 10 cm  Needle localization: ultrasound guidance     image stored in chart  Assessment  Injection assessment: negative aspiration for heme, no paresthesia on injection and incremental injection  Paresthesia pain: none  Heart rate change: no  Slow fractionated injection: yes  Additional Notes  With 4mg decadron

## 2024-03-14 PROBLEM — M17.11 OSTEOARTHRITIS OF RIGHT KNEE, UNSPECIFIED OSTEOARTHRITIS TYPE: Status: ACTIVE | Noted: 2024-03-14

## 2024-03-14 LAB
ALBUMIN SERPL-MCNC: 3.2 G/DL (ref 3.5–5)
ALP BLD-CCNC: 41 U/L (ref 35–125)
ALT SERPL-CCNC: 7 U/L (ref 5–40)
ANION GAP SERPL CALC-SCNC: 10 MMOL/L
ANION GAP SERPL CALC-SCNC: 9 MMOL/L
AST SERPL-CCNC: 17 U/L (ref 5–40)
BASOPHILS # BLD AUTO: 0 X10*3/UL (ref 0–0.1)
BASOPHILS # BLD AUTO: 0.01 X10*3/UL (ref 0–0.1)
BASOPHILS NFR BLD AUTO: 0 %
BASOPHILS NFR BLD AUTO: 0.1 %
BILIRUB SERPL-MCNC: 0.3 MG/DL (ref 0.1–1.2)
BUN SERPL-MCNC: 17 MG/DL (ref 8–25)
BUN SERPL-MCNC: 20 MG/DL (ref 8–25)
CALCIUM SERPL-MCNC: 7.9 MG/DL (ref 8.5–10.4)
CALCIUM SERPL-MCNC: 8.1 MG/DL (ref 8.5–10.4)
CHLORIDE SERPL-SCNC: 101 MMOL/L (ref 97–107)
CHLORIDE SERPL-SCNC: 101 MMOL/L (ref 97–107)
CO2 SERPL-SCNC: 24 MMOL/L (ref 24–31)
CO2 SERPL-SCNC: 25 MMOL/L (ref 24–31)
CREAT SERPL-MCNC: 0.9 MG/DL (ref 0.4–1.6)
CREAT SERPL-MCNC: 1 MG/DL (ref 0.4–1.6)
EGFRCR SERPLBLD CKD-EPI 2021: 57 ML/MIN/1.73M*2
EGFRCR SERPLBLD CKD-EPI 2021: 65 ML/MIN/1.73M*2
EOSINOPHIL # BLD AUTO: 0 X10*3/UL (ref 0–0.4)
EOSINOPHIL # BLD AUTO: 0 X10*3/UL (ref 0–0.4)
EOSINOPHIL NFR BLD AUTO: 0 %
EOSINOPHIL NFR BLD AUTO: 0 %
ERYTHROCYTE [DISTWIDTH] IN BLOOD BY AUTOMATED COUNT: 13.4 % (ref 11.5–14.5)
ERYTHROCYTE [DISTWIDTH] IN BLOOD BY AUTOMATED COUNT: 13.6 % (ref 11.5–14.5)
GLUCOSE SERPL-MCNC: 124 MG/DL (ref 65–99)
GLUCOSE SERPL-MCNC: 126 MG/DL (ref 65–99)
HCT VFR BLD AUTO: 24.9 % (ref 36–46)
HCT VFR BLD AUTO: 25.9 % (ref 36–46)
HGB BLD-MCNC: 8 G/DL (ref 12–16)
HGB BLD-MCNC: 8.4 G/DL (ref 12–16)
IMM GRANULOCYTES # BLD AUTO: 0.02 X10*3/UL (ref 0–0.5)
IMM GRANULOCYTES # BLD AUTO: 0.02 X10*3/UL (ref 0–0.5)
IMM GRANULOCYTES NFR BLD AUTO: 0.3 % (ref 0–0.9)
IMM GRANULOCYTES NFR BLD AUTO: 0.3 % (ref 0–0.9)
IRON SATN MFR SERPL: 56 % (ref 12–50)
IRON SERPL-MCNC: 120 UG/DL (ref 30–160)
LYMPHOCYTES # BLD AUTO: 0.75 X10*3/UL (ref 0.8–3)
LYMPHOCYTES # BLD AUTO: 1.82 X10*3/UL (ref 0.8–3)
LYMPHOCYTES NFR BLD AUTO: 26.6 %
LYMPHOCYTES NFR BLD AUTO: 9.9 %
MCH RBC QN AUTO: 31.2 PG (ref 26–34)
MCH RBC QN AUTO: 31.5 PG (ref 26–34)
MCHC RBC AUTO-ENTMCNC: 32.1 G/DL (ref 32–36)
MCHC RBC AUTO-ENTMCNC: 32.4 G/DL (ref 32–36)
MCV RBC AUTO: 96 FL (ref 80–100)
MCV RBC AUTO: 98 FL (ref 80–100)
MONOCYTES # BLD AUTO: 0.84 X10*3/UL (ref 0.05–0.8)
MONOCYTES # BLD AUTO: 0.86 X10*3/UL (ref 0.05–0.8)
MONOCYTES NFR BLD AUTO: 11 %
MONOCYTES NFR BLD AUTO: 12.6 %
NEUTROPHILS # BLD AUTO: 4.14 X10*3/UL (ref 1.6–5.5)
NEUTROPHILS # BLD AUTO: 5.99 X10*3/UL (ref 1.6–5.5)
NEUTROPHILS NFR BLD AUTO: 60.5 %
NEUTROPHILS NFR BLD AUTO: 78.7 %
NRBC BLD-RTO: 0 /100 WBCS (ref 0–0)
NRBC BLD-RTO: 0 /100 WBCS (ref 0–0)
PLATELET # BLD AUTO: 226 X10*3/UL (ref 150–450)
PLATELET # BLD AUTO: 234 X10*3/UL (ref 150–450)
POTASSIUM SERPL-SCNC: 3.8 MMOL/L (ref 3.4–5.1)
POTASSIUM SERPL-SCNC: 3.8 MMOL/L (ref 3.4–5.1)
PROT SERPL-MCNC: 5.3 G/DL (ref 5.9–7.9)
RBC # BLD AUTO: 2.54 X10*6/UL (ref 4–5.2)
RBC # BLD AUTO: 2.69 X10*6/UL (ref 4–5.2)
SODIUM SERPL-SCNC: 134 MMOL/L (ref 133–145)
SODIUM SERPL-SCNC: 136 MMOL/L (ref 133–145)
TIBC SERPL-MCNC: 216 UG/DL (ref 228–428)
UIBC SERPL-MCNC: 96 UG/DL (ref 110–370)
WBC # BLD AUTO: 6.8 X10*3/UL (ref 4.4–11.3)
WBC # BLD AUTO: 7.6 X10*3/UL (ref 4.4–11.3)

## 2024-03-14 PROCEDURE — 80048 BASIC METABOLIC PNL TOTAL CA: CPT | Mod: CCI | Performed by: INTERNAL MEDICINE

## 2024-03-14 PROCEDURE — 97530 THERAPEUTIC ACTIVITIES: CPT | Mod: GO,CO

## 2024-03-14 PROCEDURE — 6350000001 HC RX 635 EPOETIN >10,000 UNITS: Mod: JZ | Performed by: NURSE PRACTITIONER

## 2024-03-14 PROCEDURE — 85025 COMPLETE CBC W/AUTO DIFF WBC: CPT | Performed by: INTERNAL MEDICINE

## 2024-03-14 PROCEDURE — 1200000002 HC GENERAL ROOM WITH TELEMETRY DAILY

## 2024-03-14 PROCEDURE — 97530 THERAPEUTIC ACTIVITIES: CPT | Mod: GP | Performed by: PHYSICAL THERAPIST

## 2024-03-14 PROCEDURE — 36415 COLL VENOUS BLD VENIPUNCTURE: CPT | Performed by: INTERNAL MEDICINE

## 2024-03-14 PROCEDURE — 2500000004 HC RX 250 GENERAL PHARMACY W/ HCPCS (ALT 636 FOR OP/ED)

## 2024-03-14 PROCEDURE — 2500000004 HC RX 250 GENERAL PHARMACY W/ HCPCS (ALT 636 FOR OP/ED): Performed by: ORTHOPAEDIC SURGERY

## 2024-03-14 PROCEDURE — 2500000001 HC RX 250 WO HCPCS SELF ADMINISTERED DRUGS (ALT 637 FOR MEDICARE OP)

## 2024-03-14 PROCEDURE — 36415 COLL VENOUS BLD VENIPUNCTURE: CPT

## 2024-03-14 PROCEDURE — 83540 ASSAY OF IRON: CPT | Performed by: NURSE PRACTITIONER

## 2024-03-14 PROCEDURE — 99024 POSTOP FOLLOW-UP VISIT: CPT | Performed by: ORTHOPAEDIC SURGERY

## 2024-03-14 PROCEDURE — 2500000004 HC RX 250 GENERAL PHARMACY W/ HCPCS (ALT 636 FOR OP/ED): Performed by: NURSE PRACTITIONER

## 2024-03-14 PROCEDURE — 80053 COMPREHEN METABOLIC PANEL: CPT | Performed by: ORTHOPAEDIC SURGERY

## 2024-03-14 PROCEDURE — 97535 SELF CARE MNGMENT TRAINING: CPT | Mod: GO,CO

## 2024-03-14 PROCEDURE — 2500000001 HC RX 250 WO HCPCS SELF ADMINISTERED DRUGS (ALT 637 FOR MEDICARE OP): Performed by: ORTHOPAEDIC SURGERY

## 2024-03-14 PROCEDURE — 99222 1ST HOSP IP/OBS MODERATE 55: CPT | Performed by: NURSE PRACTITIONER

## 2024-03-14 PROCEDURE — 97110 THERAPEUTIC EXERCISES: CPT | Mod: GP | Performed by: PHYSICAL THERAPIST

## 2024-03-14 PROCEDURE — 85025 COMPLETE CBC W/AUTO DIFF WBC: CPT

## 2024-03-14 RX ORDER — CYANOCOBALAMIN 1000 UG/ML
1000 INJECTION, SOLUTION INTRAMUSCULAR; SUBCUTANEOUS ONCE
Status: COMPLETED | OUTPATIENT
Start: 2024-03-14 | End: 2024-03-14

## 2024-03-14 RX ADMIN — APIXABAN 2.5 MG: 2.5 TABLET, FILM COATED ORAL at 20:22

## 2024-03-14 RX ADMIN — IRON SUCROSE 100 MG: 20 INJECTION, SOLUTION INTRAVENOUS at 12:03

## 2024-03-14 RX ADMIN — PANTOPRAZOLE SODIUM 40 MG: 40 TABLET, DELAYED RELEASE ORAL at 06:03

## 2024-03-14 RX ADMIN — CLOTRIMAZOLE AND BETAMETHASONE DIPROPIONATE 1 APPLICATION: 10; .5 CREAM TOPICAL at 20:23

## 2024-03-14 RX ADMIN — ACETAMINOPHEN 650 MG: 325 TABLET ORAL at 00:13

## 2024-03-14 RX ADMIN — ACETAMINOPHEN 650 MG: 325 TABLET ORAL at 18:22

## 2024-03-14 RX ADMIN — APIXABAN 2.5 MG: 2.5 TABLET, FILM COATED ORAL at 10:31

## 2024-03-14 RX ADMIN — POLYETHYLENE GLYCOL 3350 17 G: 17 POWDER, FOR SOLUTION ORAL at 12:03

## 2024-03-14 RX ADMIN — FLECAINIDE ACETATE 50 MG: 50 TABLET ORAL at 20:22

## 2024-03-14 RX ADMIN — CEFAZOLIN SODIUM 2 G: 2 INJECTION, SOLUTION INTRAVENOUS at 01:06

## 2024-03-14 RX ADMIN — CYANOCOBALAMIN 1000 MCG: 1000 INJECTION, SOLUTION INTRAMUSCULAR; SUBCUTANEOUS at 12:27

## 2024-03-14 RX ADMIN — CLOTRIMAZOLE AND BETAMETHASONE DIPROPIONATE 1 APPLICATION: 10; .5 CREAM TOPICAL at 12:02

## 2024-03-14 RX ADMIN — SODIUM CHLORIDE 1000 ML: 900 INJECTION, SOLUTION INTRAVENOUS at 10:31

## 2024-03-14 RX ADMIN — ACETAMINOPHEN 650 MG: 325 TABLET ORAL at 06:03

## 2024-03-14 RX ADMIN — SODIUM CHLORIDE 100 ML/HR: 900 INJECTION, SOLUTION INTRAVENOUS at 00:16

## 2024-03-14 RX ADMIN — FLECAINIDE ACETATE 50 MG: 50 TABLET ORAL at 12:03

## 2024-03-14 RX ADMIN — ACETAMINOPHEN 650 MG: 325 TABLET ORAL at 12:03

## 2024-03-14 RX ADMIN — EPOETIN ALFA-EPBX 10000 UNITS: 10000 INJECTION, SOLUTION INTRAVENOUS; SUBCUTANEOUS at 14:07

## 2024-03-14 RX ADMIN — LEVOTHYROXINE SODIUM 50 MCG: 0.05 TABLET ORAL at 06:03

## 2024-03-14 SDOH — ECONOMIC STABILITY: FOOD INSECURITY: WITHIN THE PAST 12 MONTHS, THE FOOD YOU BOUGHT JUST DIDN'T LAST AND YOU DIDN'T HAVE MONEY TO GET MORE.: NEVER TRUE

## 2024-03-14 SDOH — ECONOMIC STABILITY: INCOME INSECURITY: HOW HARD IS IT FOR YOU TO PAY FOR THE VERY BASICS LIKE FOOD, HOUSING, MEDICAL CARE, AND HEATING?: NOT VERY HARD

## 2024-03-14 SDOH — ECONOMIC STABILITY: INCOME INSECURITY: IN THE LAST 12 MONTHS, WAS THERE A TIME WHEN YOU WERE NOT ABLE TO PAY THE MORTGAGE OR RENT ON TIME?: NO

## 2024-03-14 SDOH — ECONOMIC STABILITY: INCOME INSECURITY: IN THE PAST 12 MONTHS, HAS THE ELECTRIC, GAS, OIL, OR WATER COMPANY THREATENED TO SHUT OFF SERVICE IN YOUR HOME?: NO

## 2024-03-14 SDOH — ECONOMIC STABILITY: HOUSING INSECURITY: IN THE LAST 12 MONTHS, HOW MANY PLACES HAVE YOU LIVED?: 1

## 2024-03-14 SDOH — SOCIAL STABILITY: SOCIAL INSECURITY: WITHIN THE LAST YEAR, HAVE YOU BEEN AFRAID OF YOUR PARTNER OR EX-PARTNER?: NO

## 2024-03-14 SDOH — ECONOMIC STABILITY: FOOD INSECURITY: WITHIN THE PAST 12 MONTHS, YOU WORRIED THAT YOUR FOOD WOULD RUN OUT BEFORE YOU GOT MONEY TO BUY MORE.: NEVER TRUE

## 2024-03-14 SDOH — SOCIAL STABILITY: SOCIAL INSECURITY: WITHIN THE LAST YEAR, HAVE YOU BEEN HUMILIATED OR EMOTIONALLY ABUSED IN OTHER WAYS BY YOUR PARTNER OR EX-PARTNER?: NO

## 2024-03-14 ASSESSMENT — COGNITIVE AND FUNCTIONAL STATUS - GENERAL
MOBILITY SCORE: 20
CLIMB 3 TO 5 STEPS WITH RAILING: A LITTLE
MOBILITY SCORE: 13
MOVING FROM LYING ON BACK TO SITTING ON SIDE OF FLAT BED WITH BEDRAILS: A LITTLE
MOBILITY SCORE: 16
WALKING IN HOSPITAL ROOM: A LITTLE
DRESSING REGULAR LOWER BODY CLOTHING: A LITTLE
HELP NEEDED FOR BATHING: A LITTLE
MOVING TO AND FROM BED TO CHAIR: A LITTLE
MOVING TO AND FROM BED TO CHAIR: A LOT
HELP NEEDED FOR BATHING: A LITTLE
WALKING IN HOSPITAL ROOM: A LOT
TURNING FROM BACK TO SIDE WHILE IN FLAT BAD: A LOT
DRESSING REGULAR LOWER BODY CLOTHING: A LITTLE
MOVING TO AND FROM BED TO CHAIR: A LITTLE
MOVING FROM LYING ON BACK TO SITTING ON SIDE OF FLAT BED WITH BEDRAILS: A LITTLE
STANDING UP FROM CHAIR USING ARMS: A LITTLE
DAILY ACTIVITIY SCORE: 21
TOILETING: A LITTLE
DRESSING REGULAR UPPER BODY CLOTHING: A LITTLE
DRESSING REGULAR LOWER BODY CLOTHING: A LOT
TURNING FROM BACK TO SIDE WHILE IN FLAT BAD: A LOT
CLIMB 3 TO 5 STEPS WITH RAILING: A LITTLE
DAILY ACTIVITIY SCORE: 21
WALKING IN HOSPITAL ROOM: A LITTLE
WALKING IN HOSPITAL ROOM: A LITTLE
TURNING FROM BACK TO SIDE WHILE IN FLAT BAD: A LOT
HELP NEEDED FOR BATHING: A LITTLE
STANDING UP FROM CHAIR USING ARMS: A LOT
STANDING UP FROM CHAIR USING ARMS: A LITTLE
MOVING TO AND FROM BED TO CHAIR: A LOT
EATING MEALS: A LITTLE
MOBILITY SCORE: 16
MOVING FROM LYING ON BACK TO SITTING ON SIDE OF FLAT BED WITH BEDRAILS: A LITTLE
CLIMB 3 TO 5 STEPS WITH RAILING: A LOT
TOILETING: A LITTLE
DAILY ACTIVITIY SCORE: 17
CLIMB 3 TO 5 STEPS WITH RAILING: A LOT
PERSONAL GROOMING: A LITTLE
STANDING UP FROM CHAIR USING ARMS: A LITTLE
TOILETING: A LITTLE

## 2024-03-14 ASSESSMENT — ENCOUNTER SYMPTOMS
DYSURIA: 0
COLOR CHANGE: 0
BRUISES/BLEEDS EASILY: 0
COUGH: 0
CHILLS: 0
ACTIVITY CHANGE: 1
APPETITE CHANGE: 0
ABDOMINAL DISTENTION: 0
HEADACHES: 0
WOUND: 0
VOMITING: 0
FEVER: 0
LIGHT-HEADEDNESS: 1
ARTHRALGIAS: 1
WHEEZING: 0
WEAKNESS: 0
FATIGUE: 0
HEMATURIA: 0
PALPITATIONS: 0
NAUSEA: 0
DIFFICULTY URINATING: 0
ABDOMINAL PAIN: 0
BLOOD IN STOOL: 0
CONSTIPATION: 0
NUMBNESS: 0
DIARRHEA: 0
SHORTNESS OF BREATH: 0
DIAPHORESIS: 0
APNEA: 1
AGITATION: 0
DIZZINESS: 1
CONFUSION: 0

## 2024-03-14 ASSESSMENT — PAIN DESCRIPTION - ORIENTATION: ORIENTATION: RIGHT

## 2024-03-14 ASSESSMENT — PAIN SCALES - GENERAL
PAINLEVEL_OUTOF10: 3
PAINLEVEL_OUTOF10: 0 - NO PAIN
PAINLEVEL_OUTOF10: 3
PAINLEVEL_OUTOF10: 5 - MODERATE PAIN
PAINLEVEL_OUTOF10: 3

## 2024-03-14 ASSESSMENT — ACTIVITIES OF DAILY LIVING (ADL): HOME_MANAGEMENT_TIME_ENTRY: 30

## 2024-03-14 ASSESSMENT — PAIN - FUNCTIONAL ASSESSMENT
PAIN_FUNCTIONAL_ASSESSMENT: FLACC (FACE, LEGS, ACTIVITY, CRY, CONSOLABILITY)
PAIN_FUNCTIONAL_ASSESSMENT: 0-10

## 2024-03-14 ASSESSMENT — PAIN SCALES - WONG BAKER: WONGBAKER_NUMERICALRESPONSE: HURTS LITTLE BIT

## 2024-03-14 NOTE — NURSING NOTE
Bedside shift report completed. Pt awake in bed. Denies pain at this time. Call light within reach.

## 2024-03-14 NOTE — PROGRESS NOTES
03/14/24 1326   Discharge Planning   Living Arrangements Spouse/significant other   Support Systems Spouse/significant other   Assistance Needed None, wears CPAP at HS   Type of Residence Private residence   Number of Stairs to Enter Residence 3   Number of Stairs Within Residence 0   Do you have animals or pets at home? No   Who is requesting discharge planning? Provider   Home or Post Acute Services Post acute facilities (Rehab/SNF/etc)   Type of Post Acute Facility Services Skilled nursing;Rehab   Patient [facility]: [facility]   Does the patient need discharge transport arranged? Yes   RoundTrip coordination needed? Yes   Patient Choice   Provider Choice list and CMS website (https://medicare.gov/care-compare#search) for post-acute Quality and Resource Measure Data were provided and reviewed with: Patient   Patient / Family choosing to utilize agency / facility established prior to hospitalization No     Met with patient at bedside to discuss discharge planning. Patient is A&OX3 from home with her spouse, they reside in a one story home with 3 steps to enter.   Prior to admission patient was independent in all ADL's and IADL's, uses no assistive devices, and wears CPAP at HS.  PCP Dr. Ysabel Varela.  Discussed MOD level therapy recommendations, patient is agreeable to skilled rehab.  Preference list provided for review, patient [facility]      [facility] built and [facility], gisselle response from [facility] and [facility] confirmed [facility] are willing and able to accept.   No precert will be needed.        [facility], confirmed [facility] are able to accept Paul 3/17  Discharge plan NOT secure  DO NOT DC without speaking to care coordination   MD will need to sign/certify the [place] for skilled rehab prior to discharge

## 2024-03-14 NOTE — CARE PLAN
The patient's goals for the shift include      The clinical goals for the shift include Pain control, increased mobility

## 2024-03-14 NOTE — PROGRESS NOTES
Occupational Therapy    OT Treatment    Patient Name: Jessy Mcdaniel  MRN: [id number]  Today's Date: 3/14/2024  Time Calculation  Start Time: 1438  Stop Time: 1523  Time Calculation (min): 45 min         Assessment:  OT Assessment: Gradual progress made towards OT goals. Continue with current OT POC to increase strength, balance and functional tolerance to maximize safety and independence during ADL/IADLs in the least restrictive environment.  Evaluation/Treatment Tolerance: Patient tolerated treatment well  End of Session Communication: Bedside nurse  End of Session Patient Position: Bed, 3 rail up, Alarm off, not on at start of session (all needs in reach)  OT Assessment Results: Decreased ADL status, Decreased upper extremity strength, Decreased safe judgment during ADL, Decreased cognition, Decreased endurance, Decreased functional mobility, Decreased gross motor control, Decreased IADLs  Evaluation/Treatment Tolerance: Patient tolerated treatment well  Plan:  Treatment Interventions: ADL retraining, Functional transfer training, UE strengthening/ROM, Endurance training, Patient/family training, Equipment evaluation/education  OT Frequency: 5 times per week  OT Discharge Recommendations: Moderate intensity level of continued care  Equipment Recommended upon Discharge: Wheeled walker  OT - OK to Discharge: Yes  Treatment Interventions: ADL retraining, Functional transfer training, UE strengthening/ROM, Endurance training, Patient/family training, Equipment evaluation/education    Subjective   Previous Visit Info:  OT Last Visit  OT Received On: 03/14/24  General:  General  Reason for Referral: impaired ADLs s/p R [id number]  Past Medical History Relevant to Rehab: A-fib, A-flutter, colon cancer, HTN, OA, osteoporosis, premature atrial contraction, sleep apnea (on CPAP), L knee menisectomy & Baker's cyst removal, bilat breast biopsy  Prior to Session Communication: Bedside nurse  Patient Position Received: Up in  chair, Alarm off, not on at start of session  General Comment: Pt cleared for OT session per nursing, pleasant and cooperative this date.  Precautions:  Hearing/Visual Limitations: wears bifocals; hearing WFL  LE Weight Bearing Status: Weight Bearing as Tolerated (RLE)  Medical Precautions: Fall precautions  Post-Surgical Precautions: Right total knee precautions  Precautions Comment: Pt educated on knee precautions  Vital Signs:     Pain:  Pain Assessment  Pain Assessment: 0-10  Pain Score: 3  Pain Type: Surgical pain  Pain Location: Knee  Pain Orientation: Right  Clinical Progression: Not changed    Objective    Cognition:  Cognition  Overall Cognitive Status: Within Functional Limits  Orientation Level: Oriented X4  Safety/Judgement: Exceptions to WFL  Insight: Mild  Task Initiation: Initiates with cues  Processing Speed: Within funtional limits  Coordination:  Movements are Fluid and Coordinated: No  Upper Body Coordination: WFL  Lower Body Coordination: slower rate of movement RLE>LLE  Activities of Daily Living: Grooming  Grooming Comments: oral hygiene tasks completed seated in bedside chair with set-up and distant supervision. Close supervision provided in standing with FWW to complete hair management tasks. Min verbal cues required for AD management to increase safety and decrease risk of falls.    UE Bathing  UE Bathing Comments: UB bathing tasks completed from seated position with increased time and close supervision    LE Bathing  LE Bathing Comments: LLE bathing tasks completed from seated position, RLE bathing tasks deferred d/t bandaged. Perineal hygiene completed using compensatory techniques from seated position    UE Dressing  UE Dressing Comments: don/doff hospital gown with close supervision from seated position    LE Dressing  LE Dressing: Yes  Pants Level of Assistance: Minimum assistance  LE Dressing Where Assessed: Toilet  LE Dressing Comments: increased assistance required to thread BLE  through pants from seated position, close supervision provided for donning of pants fully over hips in supported standing with FWW. Verbal instruction provided on adaptive bag technique to don compression stockings, pt requesting this ESTEVES to provide education the following date with pt spouse present.  Functional Standing Tolerance:     Bed Mobility/Transfers: Bed Mobility  Bed Mobility: Yes  Bed Mobility 1  Bed Mobility 1: Sitting to supine  Level of Assistance 1: Close supervision  Bed Mobility Comments 1: increased time and effort required for task completion    Transfers  Transfer: Yes  Transfer 1  Trials/Comments 1: sit<>stands completed from standard chair and toilet heights with FWW and close supervision- min verbal cues required for proper hand placement to increase safety and decrease risk of falls.    Toilet Transfers  Toilet Transfer From: Chair  Toilet Transfer Type: To and from  Toilet Transfer to: Standard toilet  Toilet Transfer Technique: Ambulating  Toilet Transfers: Contact guard  Toilet Transfers Comments: with FWW  Tub Transfers  Tub Transfers Comments: tub transfer simulation completed in standing with modA- verbal instruction and demonstration provided for sequencing to maximize safety and potential for transfer completion. Decreased carry-over observed, continued simulations recommended prior to discharge to decrease risk of falls. Increased assistance required for transfer completion d/t limited R knee flexion.      Functional Mobility:  Functional Mobility  Functional Mobility Performed: Yes  Functional Mobility 1  Surface 1: Level tile  Device 1: Rolling walker  Assistance 1: Contact guard  Quality of Functional Mobility 1: Inconsistent stride length  Comments 1: Functional mobility completed <>bathroom with FWW and CGA in preparation for ADL completion. Increased time and verbal cues required for AD management to increase safety and decrease risk of falls.      Outcome Measures:[facility]  Daily Activity  Putting on and taking off regular lower body clothing: A lot  Bathing (including washing, rinsing, drying): A little  Putting on and taking off regular upper body clothing: A little  Toileting, which includes using toilet, bedpan or urinal: A little  Taking care of personal grooming such as brushing teeth: A little  Eating Meals: A little  Daily Activity - Total Score: 17        Education Documentation  Body Mechanics, taught by JORDYN Orellana at 3/14/2024  6:45 PM.  Learner: Patient  Readiness: Acceptance  Method: Explanation, Demonstration  Response: Needs Reinforcement    Precautions, taught by JORDYN Orellana at 3/14/2024  6:45 PM.  Learner: Patient  Readiness: Acceptance  Method: Explanation, Demonstration  Response: Needs Reinforcement    ADL Training, taught by JORDYN Orellana at 3/14/2024  6:45 PM.  Learner: Patient  Readiness: Acceptance  Method: Explanation, Demonstration  Response: Needs Reinforcement    Education Comments  Education provided on role of OT/POC, safety awareness throughout functional tasks/transfers, importance of activity/ rest routine, and use of call light for assistance. Questions, comments and concerns addressed regarding OT.      Goals:  Encounter Problems       Encounter Problems (Active)       OT Goals       ADLs (Progressing)       Start:  03/13/24    Expected End:  03/30/24       Pt will complete ADL tasks with Mod I, using AE as needed, in order to complete self-care tasks.           Functional mobility (Progressing)       Start:  03/13/24    Expected End:  03/30/24       Pt will perform functional mobility household distance at mod ind level with RW           Functional transfers (Progressing)       Start:  03/13/24    Expected End:  03/30/24       Pt will perform functional transfers at mod ind level with RW

## 2024-03-14 NOTE — PROGRESS NOTES
"Jessy Mcdaniel is a 78 y.o. female on day 1 of admission presenting with Osteoarthritis of right knee.    Subjective   The patient is seen at the bedside in the presence of her .  She is tolerating her right knee pain relatively well.  She does not have any chest pain or shortness of breath.       Objective     Physical Exam  General: The patient is resting comfortably in bed.  She does not appear to be in any respiratory or pulmonary distress    Right knee and lower extremity: Dressing is dry.  Neurovascular is intact.  Right calf and thigh are nontender and nonswollen  Last Recorded Vitals  Blood pressure (!) 132/43, pulse 71, temperature 36.7 °C (98.1 °F), temperature source Oral, resp. rate 18, height 1.651 m (5' 5\"), weight 80.8 kg (178 lb 2.1 oz), SpO2 95 %.  Intake/Output last 3 Shifts:  I/O last 3 completed shifts:  In: 1760 (21.8 mL/kg) [P.O.:360; I.V.:1300 (16.1 mL/kg); IV Piggyback:100]  Out: 650 (8 mL/kg) [Urine:650 (0.2 mL/kg/hr)]  Weight: 80.8 kg     Relevant Results      Scheduled medications  acetaminophen, 650 mg, oral, q6h HESHAM  apixaban, 2.5 mg, oral, q12h HESHAM  clotrimazole-betamethasone, 1 Application, Topical, BID  flecainide, 50 mg, oral, BID  [Held by provider] furosemide, 20 mg, oral, Daily  levothyroxine, 50 mcg, oral, Daily  nystatin, 1 Application, Topical, BID  pantoprazole, 40 mg, oral, Daily before breakfast  polyethylene glycol, 17 g, oral, Daily  [Held by provider] triamterene-hydrochlorothiazid, 1 tablet, oral, q AM      Continuous medications  oxygen, 2 L/min      PRN medications  PRN medications: acetaminophen, metoclopramide, morphine, naloxone, naloxone, oxyCODONE-acetaminophen  Results for orders placed or performed during the hospital encounter of 03/13/24 (from the past 24 hour(s))   Basic metabolic panel   Result Value Ref Range    Glucose 126 (H) 65 - 99 mg/dL    Sodium 136 133 - 145 mmol/L    Potassium 3.8 3.4 - 5.1 mmol/L    Chloride 101 97 - 107 mmol/L    " Bicarbonate 25 24 - 31 mmol/L    Urea Nitrogen 17 8 - 25 mg/dL    Creatinine 0.90 0.40 - 1.60 mg/dL    eGFR 65 >60 mL/min/1.73m*2    Calcium 8.1 (L) 8.5 - 10.4 mg/dL    Anion Gap 10 <=19 mmol/L   CBC and Auto Differential   Result Value Ref Range    WBC 7.6 4.4 - 11.3 x10*3/uL    nRBC 0.0 0.0 - 0.0 /100 WBCs    RBC 2.69 (L) 4.00 - 5.20 x10*6/uL    Hemoglobin 8.4 (L) 12.0 - 16.0 g/dL    Hematocrit 25.9 (L) 36.0 - 46.0 %    MCV 96 80 - 100 fL    MCH 31.2 26.0 - 34.0 pg    MCHC 32.4 32.0 - 36.0 g/dL    RDW 13.4 11.5 - 14.5 %    Platelets 226 150 - 450 x10*3/uL    Neutrophils % 78.7 40.0 - 80.0 %    Immature Granulocytes %, Automated 0.3 0.0 - 0.9 %    Lymphocytes % 9.9 13.0 - 44.0 %    Monocytes % 11.0 2.0 - 10.0 %    Eosinophils % 0.0 0.0 - 6.0 %    Basophils % 0.1 0.0 - 2.0 %    Neutrophils Absolute 5.99 (H) 1.60 - 5.50 x10*3/uL    Immature Granulocytes Absolute, Automated 0.02 0.00 - 0.50 x10*3/uL    Lymphocytes Absolute 0.75 (L) 0.80 - 3.00 x10*3/uL    Monocytes Absolute 0.84 (H) 0.05 - 0.80 x10*3/uL    Eosinophils Absolute 0.00 0.00 - 0.40 x10*3/uL    Basophils Absolute 0.01 0.00 - 0.10 x10*3/uL   Iron and TIBC   Result Value Ref Range    Iron 120 30 - 160 ug/dL    UIBC 96 (L) 110 - 370 ug/dL    TIBC 216 (L) 228 - 428 ug/dL    % Saturation 56 (H) 12 - 50 %                            Assessment/Plan   Principal Problem:    Osteoarthritis of right knee  Active Problems:    Chronic pain of right knee    Primary osteoarthritis of right knee    Osteoarthritis of right knee, unspecified osteoarthritis type    History of right total knee replacement    Plan: Continue with physical therapy.  Physical therapy has recommended care at a skilled level and the patient and her  and I all strongly agree.       I spent 30 minutes in the professional and overall care of this patient.      Nic Guerra MD

## 2024-03-14 NOTE — PROGRESS NOTES
Physical Therapy    Physical Therapy Treatment    Patient Name: Jessy Mcdaniel  MRN: [id number]  Today's Date: 3/14/2024  Time Calculation  Start Time: 1414  Stop Time: 1438  Time Calculation (min): 24 min       Assessment/Plan   PT Assessment  End of Session Communication: Bedside nurse  Assessment Comment: She made adequate progress toward her functional mobility goals. Pt presented with decreased muscular strength/endurance, impaired R knee AROM, and increased assist required for functional mobility. She would benefit from continued skilled PT services for maximizing independence and safety with functional mobility prior to and after discharge.  End of Session Patient Position: Up in chair, Alarm off, not on at start of session (call light and needs within reach)     PT Plan  Treatment/Interventions: Bed mobility, Transfer training, Gait training, Stair training, Balance training, Strengthening, Range of motion, Therapeutic exercise, Therapeutic activity, Home exercise program  PT Plan: Skilled PT  PT Frequency: BID  PT Discharge Recommendations: Moderate intensity level of continued care  Equipment Recommended upon Discharge: Wheeled walker  PT Recommended Transfer Status: Assist x1 (FWW)  PT - OK to Discharge: Yes (with continued skilled PT services at next level of care)      General Visit Information:   PT  Visit  PT Received On: 03/14/24  General  Prior to Session Communication: Bedside nurse (RN cleared pt for participation and obtained orthostatics during session.)  Patient Position Received: Bed, 3 rail up, Alarm off, not on at start of session  General Comment: Pt agreed to tx and participated fully. She reported mild dizziness upon standing which did not change in intensity with time. RN aware.    Subjective   Precautions:  Precautions  LE Weight Bearing Status: Weight Bearing as Tolerated (RLE)  Medical Precautions: Fall precautions  Post-Surgical Precautions: Right total knee precautions (Pt able to  state 2/3 precautions prior to education.)    Objective   Pain:  Pain Assessment  Pain Assessment: FLACC (Face, Legs, Activity, Cry, Consolability)  Pain Score: 3  Pain Type: Surgical pain  Pain Location: Knee  Pain Orientation: Right  Pain Interventions: Ambulation/increased activity, Repositioned    Activity Tolerance:  Activity Tolerance  Endurance: Tolerates 10 - 20 min exercise with multiple rests  Treatments:  Therapeutic Exercise  Therapeutic Exercise Performed: Yes  Therapeutic Exercise Activity 1: R LAQ 2 x20  Therapeutic Exercise Activity 2: R seated knee flex 2 x20    Bed Mobility  Bed Mobility: Yes  Bed Mobility 1  Bed Mobility 1: Supine to sitting  Level of Assistance 1: Close supervision  Bed Mobility Comments 1: HOB elevated and used R bed rail  Bed Mobility 2  Bed Mobility  2: Scooting  Level of Assistance 2: Close supervision  Bed Mobility Comments 2: fwd scoot seated EOB  Bed Mobility 3  Bed Mobility 3: Sitting to supine  Level of Assistance 3: Dependent (2 person assist due to hypotension)  Bed Mobility Comments 3: bed flat, toward R side    Ambulation/Gait Training  Ambulation/Gait Training Performed: Yes  Ambulation/Gait Training 1  Surface 1: Level tile  Device 1: Rolling walker  Assistance 1: Contact guard (cued for increased cervical extension and walker management with tight turns)  Comments/Distance (ft) 1: 75 ft using minimal step through pattern with slowed velocity and discontinuous movement. Steady gait.    Transfers  Transfer: Yes  Transfer 1  Technique 1: Sit to stand  Transfer Device 1: Walker  Transfer Level of Assistance 1: Contact guard (cued for RLE positioning)  Trials/Comments 1: x1 trial at EOB  Transfers 2  Technique 2: Stand to sit  Transfer Device 2: Walker  Transfer Level of Assistance 2: Contact guard (cued for RLE positioning)  Trials/Comments 2: x1 trial at bedside chair    Outcome Measures:  [facility] Basic Mobility  Turning from your back to your side while in a flat bed  without using bedrails: A little  Moving from lying on your back to sitting on the side of a flat bed without using bedrails: A lot  Moving to and from bed to chair (including a wheelchair): A little  Standing up from a chair using your arms (e.g. wheelchair or bedside chair): A little  To walk in hospital room: A little  Climbing 3-5 steps with railing: A lot  Basic Mobility - Total Score: 16    Education Documentation  Precautions, taught by Katelynn Hartley PT at 3/14/2024  3:45 PM.  Learner: Patient  Readiness: Eager  Method: Explanation  Response: Needs Reinforcement, Verbalizes Understanding    Home Exercise Program, taught by Katelynn Hartley PT at 3/14/2024  3:45 PM.  Learner: Patient  Readiness: Eager  Method: Explanation  Response: Needs Reinforcement, Verbalizes Understanding    Mobility Training, taught by Katelynn Hartley PT at 3/14/2024  3:45 PM.  Learner: Patient  Readiness: Eager  Method: Explanation  Response: Needs Reinforcement, Verbalizes Understanding    Education Comments  No comments found.        OP EDUCATION:       Encounter Problems       Encounter Problems (Active)       Mobility       STG - Patient will ambulate >/=100 ft with FWW at mod I level. (Progressing)       Start:  03/13/24    Expected End:  03/26/24            STG - Patient will ascend and descend 4 stairs with bilateral rails and close S. (Not Progressing)       Start:  03/13/24    Expected End:  03/26/24               PT Transfers       STG - Patient to transfer supine<>sit at mod I level. (Progressing)       Start:  03/13/24    Expected End:  03/26/24            STG - Patient will transfer sit<>stand with FWW at mod I level. (Progressing)       Start:  03/13/24    Expected End:  03/26/24

## 2024-03-14 NOTE — CONSULTS
Consults-blood management    Reason For Consult  Post op anemia    History Of Present Illness  Jessy Mcdaniel is a 78 y.o. female presenting with OA right knee. She is 1 day S/P right TKA. Pt reports right knee pain for years, pain progressively increasing, she had associated knee instability without falls. Relief of pain at rest, severe pain with prolonged ambulation or standing.  cortisone injections no longer affective. Preop H/H 11.9/37.3, post op H/H 8.4/25.9, Iron panel stable. Hx of Paroxysmal atrial fib, on Eliquis, stopped 3days prior to surgery. Takes daily fish oil, she stopped 1week before surgery. Both could attribute to increased blood loss and post op anemia.hx remote colon cancer with partial colectomy and chemo in 1992, colonoscopy 1/2022, and 12/2016 results of both procedures normal. .      Past Medical History  She has a past medical history of Atrial fibrillation and flutter (CMS/HCC), Bilateral knee pain, Colon cancer (CMS/HCC), High cholesterol, Hypertension, Hypothyroidism, Osteoarthritis, Osteoporosis, Premature atrial contraction, and Sleep apnea.    Surgical History  She has a past surgical history that includes BI stereotactic guided breast right localization and biopsy (Right, 09/24/2014); BI stereotactic guided breast right localization and biopsy (Right, 11/27/2017); BI US guided breast localization and biopsy left (Left, 12/09/2022); Colon surgery (1992); Appendectomy; Hysterectomy; Knee arthroscopy w/ meniscectomy (Left); Cataract extraction w/ intraocular lens  implant, bilateral; Anderson tooth extraction; and Colonoscopy.     Social History  She reports that she has never smoked. She has never been exposed to tobacco smoke. She has never used smokeless tobacco. She reports that she does not drink alcohol and does not use drugs.    Family History  Family History   Problem Relation Name Age of Onset    Coronary artery disease Mother      Heart disease Mother      Stroke Father           Allergies  Prochlorperazine edisylate and Prochlorperazine    Review of Systems   Constitutional:  Positive for activity change. Negative for appetite change, chills, diaphoresis, fatigue and fever.   HENT:  Negative for congestion and nosebleeds.    Eyes:  Negative for visual disturbance.   Respiratory:  Positive for apnea (compliant with CPAP). Negative for cough, shortness of breath and wheezing.    Cardiovascular:  Negative for chest pain, palpitations and leg swelling.        Hx atrial fib, on Eliquis,  stopped 3days prior to surgery   Gastrointestinal:  Negative for abdominal distention, abdominal pain, blood in stool, constipation, diarrhea, nausea and vomiting.   Endocrine: Negative for cold intolerance and heat intolerance.   Genitourinary:  Negative for difficulty urinating, dysuria and hematuria.   Musculoskeletal:  Positive for arthralgias and gait problem.   Skin:  Negative for color change, pallor, rash and wound.   Neurological:  Positive for dizziness and light-headedness. Negative for weakness, numbness and headaches.        C/o lightheaded, dizziness when ambulating in [place] with therapy this morning. Denies at this time resting in bed   Hematological:  Does not bruise/bleed easily.   Psychiatric/Behavioral:  Negative for agitation and confusion.         Physical Exam  Constitutional:       Appearance: Normal appearance.   HENT:      Head: Normocephalic and atraumatic.      Right Ear: External ear normal.      Left Ear: External ear normal.      Nose: Nose normal.      Mouth/Throat:      Pharynx: Oropharynx is clear.   Eyes:      Conjunctiva/sclera: Conjunctivae normal.      Pupils: Pupils are equal, round, and reactive to light.   Cardiovascular:      Rate and Rhythm: Normal rate and regular rhythm.      Heart sounds: Normal heart sounds.   Pulmonary:      Effort: Pulmonary effort is normal. No respiratory distress.      Breath sounds: Normal breath sounds. No wheezing, rhonchi or rales.   Chest:  "     Chest wall: No tenderness.   Abdominal:      General: Bowel sounds are normal. There is no distension.      Palpations: Abdomen is soft.      Tenderness: There is no abdominal tenderness.   Musculoskeletal:         General: Tenderness present.      Cervical back: Normal range of motion and neck supple.      Right lower leg: No edema.      Left lower leg: No edema.   Skin:     General: Skin is warm and dry.      Capillary Refill: Capillary refill takes 2 to 3 seconds.      Coloration: Skin is not pale.      Findings: No bruising, erythema, lesion or rash.      Comments: Ace wrap right leg dry/intact   Neurological:      General: No focal deficit present.      Mental Status: She is alert and oriented to person, place, and time. Mental status is at baseline.   Psychiatric:         Mood and Affect: Mood normal.         Behavior: Behavior normal.          Last Recorded Vitals  Blood pressure (!) 103/41, pulse 71, temperature 36.7 °C (98.1 °F), temperature source Oral, resp. rate 17, height 1.651 m (5' 5\"), weight 80.8 kg (178 lb 2.1 oz), SpO2 97 %.    Relevant Results  Results for orders placed or performed during the hospital encounter of 03/13/24 (from the past 24 hour(s))   Basic metabolic panel   Result Value Ref Range    Glucose 126 (H) 65 - 99 mg/dL    Sodium 136 133 - 145 mmol/L    Potassium 3.8 3.4 - 5.1 mmol/L    Chloride 101 97 - 107 mmol/L    Bicarbonate 25 24 - 31 mmol/L    Urea Nitrogen 17 8 - 25 mg/dL    Creatinine 0.90 0.40 - 1.60 mg/dL    eGFR 65 >60 mL/min/1.73m*2    Calcium 8.1 (L) 8.5 - 10.4 mg/dL    Anion Gap 10 <=19 mmol/L   CBC and Auto Differential   Result Value Ref Range    WBC 7.6 4.4 - 11.3 x10*3/uL    nRBC 0.0 0.0 - 0.0 /100 WBCs    RBC 2.69 (L) 4.00 - 5.20 x10*6/uL    Hemoglobin 8.4 (L) 12.0 - 16.0 g/dL    Hematocrit 25.9 (L) 36.0 - 46.0 %    MCV 96 80 - 100 fL    MCH 31.2 26.0 - 34.0 pg    MCHC 32.4 32.0 - 36.0 g/dL    RDW 13.4 11.5 - 14.5 %    Platelets 226 150 - 450 x10*3/uL    " Neutrophils % 78.7 40.0 - 80.0 %    Immature Granulocytes %, Automated 0.3 0.0 - 0.9 %    Lymphocytes % 9.9 13.0 - 44.0 %    Monocytes % 11.0 2.0 - 10.0 %    Eosinophils % 0.0 0.0 - 6.0 %    Basophils % 0.1 0.0 - 2.0 %    Neutrophils Absolute 5.99 (H) 1.60 - 5.50 x10*3/uL    Immature Granulocytes Absolute, Automated 0.02 0.00 - 0.50 x10*3/uL    Lymphocytes Absolute 0.75 (L) 0.80 - 3.00 x10*3/uL    Monocytes Absolute 0.84 (H) 0.05 - 0.80 x10*3/uL    Eosinophils Absolute 0.00 0.00 - 0.40 x10*3/uL    Basophils Absolute 0.01 0.00 - 0.10 x10*3/uL   Iron and TIBC   Result Value Ref Range    Iron 120 30 - 160 ug/dL    UIBC 96 (L) 110 - 370 ug/dL    TIBC 216 (L) 228 - 428 ug/dL    % Saturation 56 (H) 12 - 50 %      Current Facility-Administered Medications:     acetaminophen (Tylenol) tablet 650 mg, 650 mg, oral, q6h [facility], Nic Guerra MD, 650 mg at 03/14/24 1203    acetaminophen (Tylenol) tablet 650 mg, 650 mg, oral, q4h PRN, Nic Guerra MD    apixaban (Eliquis) tablet 2.5 mg, 2.5 mg, oral, q12h [facility]AM, Nic Guerra MD, 2.5 mg at 03/14/24 1031    clotrimazole-betamethasone (Lotrisone) cream 1 Application, 1 Application, Topical, BID, Nic Guerra MD, 1 Application at 03/14/24 1202    epoetin marizol-epbx (Retacrit) injection 10,000 Units, 10,000 Units, subcutaneous, Once, Bruna Be, APRN-CNP    flecainide (Tambocor) tablet 50 mg, 50 mg, oral, BID, Nic Guerra MD, 50 mg at 03/14/24 1203    [Held by provider] furosemide (Lasix) tablet 20 mg, 20 mg, oral, Daily, Nic G Convery, MD    levothyroxine (Synthroid, Levoxyl) tablet 50 mcg, 50 mcg, oral, Daily, Nic Guerra MD, 50 mcg at 03/14/24 0603    metoclopramide (Reglan) injection 10 mg, 10 mg, intravenous, q6h PRN, Meliza Zuniga, APRN-CNP    morphine injection 2 mg, 2 mg, intravenous, q2h PRN, Nic Guerra MD    naloxone (Narcan) injection 0.2 mg, 0.2 mg, intravenous, q5 min PRN, Nic Guerra MD    naloxone (Narcan)  injection 0.2 mg, 0.2 mg, intravenous, q5 min PRN, Nic Guerra MD    nystatin (Mycostatin) 100,000 unit/gram powder 1 Application, 1 Application, Topical, BID, Nic Guerra MD    oxyCODONE-acetaminophen (Percocet) 5-325 mg per tablet 1 tablet, 1 tablet, oral, q4h PRN, Nic Guerra MD    oxygen (O2) therapy, 2 L/min, inhalation, Continuous, Nic Guerra MD, 2 L/min at 03/13/24 1330    pantoprazole (ProtoNix) EC tablet 40 mg, 40 mg, oral, Daily before breakfast, LIAM Carbajal-CNP, 40 mg at 03/14/24 0603    polyethylene glycol (Glycolax, Miralax) packet 17 g, 17 g, oral, Daily, Nic Guerra MD, 17 g at 03/14/24 1203    [Held by provider] triamterene-hydrochlorothiazid (Maxzide-25) 37.5-25 mg per tablet 1 tablet, 1 tablet, oral, q AM, Nic Guerra MD      Assessment/Plan   OA right knee  S/p right TKA  Paroxysmal atrial fib  SHANDA-compliant with cpap  Post op normocytic anemia  Anemia of acute blood loss  Iron panel stable  Agree with IV Venofer 200mg IV X1  Will give B12 1000mcg IM X1  Epogen [id number] subcutaneous X1  Lab to microsample blood draws-signage placed on door.  Monitor H/H  RBC transfusion 1unit as needed to maintain Hgb over 7

## 2024-03-14 NOTE — PROGRESS NOTES
Jessy Mcdaniel is a 78 y.o. female on day 1 of admission presenting with Osteoarthritis of right knee.      Subjective   Seen and examined.  POD #1 right total knee replacement.  Denies acute events overnight.  Has been weaned to room air.  Denies chest pain or shortness of breath.  Is in no acute distress.       Objective     Last Recorded Vitals  BP (!) 91/36 (BP Location: Right arm, Patient Position: Lying)   Pulse 60   Temp 36.6 °C (97.9 °F) (Oral)   Resp 18   Wt 80.8 kg (178 lb 2.1 oz)   SpO2 95%   Intake/Output last 3 Shifts:    Intake/Output Summary (Last 24 hours) at 3/14/2024 1029  Last data filed at 3/14/2024 0904  Gross per 24 hour   Intake 760 ml   Output 950 ml   Net -190 ml       Admission Weight  Weight: 80.8 kg (178 lb 2.1 oz) (03/13/24 1622)    Daily Weight  03/13/24 : 80.8 kg (178 lb 2.1 oz)    Image Results  XR knee right 1-2 views  Narrative: Interpreted By:  Kevan Lucas,   STUDY:  XR KNEE RIGHT 1-2 VIEWS; 3/13/2024 11:53 am      INDICATION:  Signs/Symptoms:Post op knee.      COMPARISON:  01/19/2024      ACCESSION NUMBER(S):  [id number]      ORDERING CLINICIAN:  MAGEN DANIEL      TECHNIQUE:  AP and lateral views of the right knee.      FINDINGS:  Normal appearance of femoral, tibial, and retropatellar components.  There are expected postoperative changes of right total knee  prosthesis. Expected postoperative changes of the soft tissues. No  abnormal radiopaque foreign body.      Impression: Expected postoperative changes of right total knee prosthesis.      Signed by: Kevan Lucas 3/13/2024 12:14 PM  Dictation workstation:   [id number]      Physical Exam  Vitals and nursing note reviewed.   Constitutional:       Appearance: Normal appearance. She is normal weight.   HENT:      Head: Normocephalic and atraumatic.      Nose: Nose normal.      Mouth/Throat:      Mouth: Mucous membranes are moist.   Eyes:      Extraocular Movements: Extraocular movements intact.      Pupils:  Pupils are equal, round, and reactive to light.   Cardiovascular:      Rate and Rhythm: Normal rate.      Pulses: Normal pulses.      Heart sounds: Normal heart sounds.   Pulmonary:      Effort: Pulmonary effort is normal.      Breath sounds: Normal breath sounds.   Abdominal:      General: Bowel sounds are normal.      Palpations: Abdomen is soft.   Musculoskeletal:         General: Tenderness present. Normal range of motion.      Cervical back: Normal range of motion and neck supple.      Comments: Emotional right lower extremity limited due to pain   Skin:     General: Skin is warm and dry.      Capillary Refill: Capillary refill takes less than 2 seconds.      Comments: Left knee dressing clean dry intact   Neurological:      General: No focal deficit present.      Mental Status: She is alert and oriented to person, place, and time.   Psychiatric:         Mood and Affect: Mood normal.         Behavior: Behavior normal.         Relevant Results               Assessment/Plan      Principal Problem:    Osteoarthritis of right knee  Active Problems:    Chronic pain of right knee    Primary osteoarthritis of right knee    Osteoarthritis of right knee, unspecified osteoarthritis type    POD #1 Right Total knee arthroplasty  As managed per primary service  PT OT  Incentive spirometer teaching  DVT prophylaxis  As needed pain management regimen  On room air    History of Atrial fibrillation, flutter  Continuous telemetry  Continue home flecainide  Low-dose Eliquis     Hypothyroidism  Continue home Synthroid     History of hypertension  Hold home Maxizde for now, hold home Lasix for now  Monitor vital signs    Orthostatic hypotension  Actually some mild orthostatic hypotension with PT recently per nursing  Likely related to recent surgery  1 L bolus, encourage p.o. intake, have communicated to nursing about easing up on pain medication, repeat orthostatic vital signs in a few hours    Acute blood loss anemia  Likely  related to surgery  Give dose of IV Venofer    Obstructive sleep apnea  CPAP q. nightly, patient has brought in her own device     Osteoarthritis  As needed Tylenol, PT OT, fall precautions     DVT/GI  Low-dose Eliquis, PPI     Will continue to follow.          Addendum 3/14/2024 1443: Has received 1 L fluid bolus, IV Venofer and epogen, vitb12.  Orthostatic hypotension has resolved.  Vital signs laying was 108/45 heart rate 67, sitting 124/53 heart rate 74, and standing 132/43 heart rate 71.     Meliza Zuniga, APRN-CNP

## 2024-03-14 NOTE — NURSING NOTE
No change from previous assessment. Pt resting comfortably in bed with call light in reach. Dressing intact to rt knee.

## 2024-03-15 ENCOUNTER — APPOINTMENT (OUTPATIENT)
Dept: CARDIOLOGY | Facility: HOSPITAL | Age: 79
DRG: 470 | End: 2024-03-15
Payer: MEDICARE

## 2024-03-15 LAB
ANION GAP SERPL CALC-SCNC: 10 MMOL/L
BASOPHILS # BLD AUTO: 0.01 X10*3/UL (ref 0–0.1)
BASOPHILS NFR BLD AUTO: 0.2 %
BUN SERPL-MCNC: 16 MG/DL (ref 8–25)
CALCIUM SERPL-MCNC: 8.2 MG/DL (ref 8.5–10.4)
CHLORIDE SERPL-SCNC: 105 MMOL/L (ref 97–107)
CO2 SERPL-SCNC: 25 MMOL/L (ref 24–31)
CREAT SERPL-MCNC: 0.9 MG/DL (ref 0.4–1.6)
EGFRCR SERPLBLD CKD-EPI 2021: 65 ML/MIN/1.73M*2
EOSINOPHIL # BLD AUTO: 0 X10*3/UL (ref 0–0.4)
EOSINOPHIL NFR BLD AUTO: 0 %
ERYTHROCYTE [DISTWIDTH] IN BLOOD BY AUTOMATED COUNT: 13.8 % (ref 11.5–14.5)
GLUCOSE SERPL-MCNC: 104 MG/DL (ref 65–99)
HCT VFR BLD AUTO: 24.9 % (ref 36–46)
HGB BLD-MCNC: 7.9 G/DL (ref 12–16)
IMM GRANULOCYTES # BLD AUTO: 0.02 X10*3/UL (ref 0–0.5)
IMM GRANULOCYTES NFR BLD AUTO: 0.4 % (ref 0–0.9)
LYMPHOCYTES # BLD AUTO: 1.59 X10*3/UL (ref 0.8–3)
LYMPHOCYTES NFR BLD AUTO: 28.6 %
MCH RBC QN AUTO: 30.6 PG (ref 26–34)
MCHC RBC AUTO-ENTMCNC: 31.7 G/DL (ref 32–36)
MCV RBC AUTO: 97 FL (ref 80–100)
MONOCYTES # BLD AUTO: 0.69 X10*3/UL (ref 0.05–0.8)
MONOCYTES NFR BLD AUTO: 12.4 %
NEUTROPHILS # BLD AUTO: 3.24 X10*3/UL (ref 1.6–5.5)
NEUTROPHILS NFR BLD AUTO: 58.4 %
NRBC BLD-RTO: 0 /100 WBCS (ref 0–0)
PLATELET # BLD AUTO: 223 X10*3/UL (ref 150–450)
POTASSIUM SERPL-SCNC: 4.1 MMOL/L (ref 3.4–5.1)
RBC # BLD AUTO: 2.58 X10*6/UL (ref 4–5.2)
SODIUM SERPL-SCNC: 140 MMOL/L (ref 133–145)
WBC # BLD AUTO: 5.6 X10*3/UL (ref 4.4–11.3)

## 2024-03-15 PROCEDURE — 85025 COMPLETE CBC W/AUTO DIFF WBC: CPT

## 2024-03-15 PROCEDURE — 97530 THERAPEUTIC ACTIVITIES: CPT | Mod: GP | Performed by: PHYSICAL THERAPIST

## 2024-03-15 PROCEDURE — 80048 BASIC METABOLIC PNL TOTAL CA: CPT

## 2024-03-15 PROCEDURE — 93971 EXTREMITY STUDY: CPT

## 2024-03-15 PROCEDURE — 1200000002 HC GENERAL ROOM WITH TELEMETRY DAILY

## 2024-03-15 PROCEDURE — 99231 SBSQ HOSP IP/OBS SF/LOW 25: CPT | Performed by: NURSE PRACTITIONER

## 2024-03-15 PROCEDURE — 36415 COLL VENOUS BLD VENIPUNCTURE: CPT

## 2024-03-15 PROCEDURE — 2500000001 HC RX 250 WO HCPCS SELF ADMINISTERED DRUGS (ALT 637 FOR MEDICARE OP): Performed by: ORTHOPAEDIC SURGERY

## 2024-03-15 PROCEDURE — 2500000004 HC RX 250 GENERAL PHARMACY W/ HCPCS (ALT 636 FOR OP/ED): Performed by: ORTHOPAEDIC SURGERY

## 2024-03-15 PROCEDURE — 97535 SELF CARE MNGMENT TRAINING: CPT | Mod: GO,CO

## 2024-03-15 PROCEDURE — 97110 THERAPEUTIC EXERCISES: CPT | Mod: GP | Performed by: PHYSICAL THERAPIST

## 2024-03-15 PROCEDURE — 99024 POSTOP FOLLOW-UP VISIT: CPT | Performed by: ORTHOPAEDIC SURGERY

## 2024-03-15 PROCEDURE — 93971 EXTREMITY STUDY: CPT | Performed by: INTERNAL MEDICINE

## 2024-03-15 PROCEDURE — 2500000001 HC RX 250 WO HCPCS SELF ADMINISTERED DRUGS (ALT 637 FOR MEDICARE OP)

## 2024-03-15 PROCEDURE — 2500000004 HC RX 250 GENERAL PHARMACY W/ HCPCS (ALT 636 FOR OP/ED)

## 2024-03-15 RX ORDER — PANTOPRAZOLE SODIUM 40 MG/1
40 TABLET, DELAYED RELEASE ORAL
Qty: 30 TABLET | Refills: 0 | Status: SHIPPED | OUTPATIENT
Start: 2024-03-16

## 2024-03-15 RX ORDER — OXYCODONE AND ACETAMINOPHEN 5; 325 MG/1; MG/1
1 TABLET ORAL EVERY 6 HOURS PRN
Qty: 28 TABLET | Refills: 0 | Status: SHIPPED | OUTPATIENT
Start: 2024-03-15 | End: 2024-03-22

## 2024-03-15 RX ADMIN — CLOTRIMAZOLE AND BETAMETHASONE DIPROPIONATE 1 APPLICATION: 10; .5 CREAM TOPICAL at 10:16

## 2024-03-15 RX ADMIN — IRON SUCROSE 100 MG: 20 INJECTION, SOLUTION INTRAVENOUS at 10:03

## 2024-03-15 RX ADMIN — ACETAMINOPHEN 650 MG: 325 TABLET ORAL at 18:09

## 2024-03-15 RX ADMIN — CLOTRIMAZOLE AND BETAMETHASONE DIPROPIONATE 1 APPLICATION: 10; .5 CREAM TOPICAL at 20:35

## 2024-03-15 RX ADMIN — ACETAMINOPHEN 650 MG: 325 TABLET ORAL at 06:44

## 2024-03-15 RX ADMIN — APIXABAN 2.5 MG: 2.5 TABLET, FILM COATED ORAL at 09:59

## 2024-03-15 RX ADMIN — APIXABAN 2.5 MG: 2.5 TABLET, FILM COATED ORAL at 20:35

## 2024-03-15 RX ADMIN — FLECAINIDE ACETATE 50 MG: 50 TABLET ORAL at 09:59

## 2024-03-15 RX ADMIN — ACETAMINOPHEN 650 MG: 325 TABLET ORAL at 23:19

## 2024-03-15 RX ADMIN — PANTOPRAZOLE SODIUM 40 MG: 40 TABLET, DELAYED RELEASE ORAL at 06:44

## 2024-03-15 RX ADMIN — POLYETHYLENE GLYCOL 3350 17 G: 17 POWDER, FOR SOLUTION ORAL at 09:59

## 2024-03-15 RX ADMIN — LEVOTHYROXINE SODIUM 50 MCG: 0.05 TABLET ORAL at 06:44

## 2024-03-15 RX ADMIN — ACETAMINOPHEN 650 MG: 325 TABLET ORAL at 12:51

## 2024-03-15 RX ADMIN — FLECAINIDE ACETATE 50 MG: 50 TABLET ORAL at 20:35

## 2024-03-15 RX ADMIN — ACETAMINOPHEN 650 MG: 325 TABLET ORAL at 00:00

## 2024-03-15 ASSESSMENT — COGNITIVE AND FUNCTIONAL STATUS - GENERAL
CLIMB 3 TO 5 STEPS WITH RAILING: A LITTLE
DRESSING REGULAR LOWER BODY CLOTHING: A LITTLE
CLIMB 3 TO 5 STEPS WITH RAILING: A LITTLE
MOVING FROM LYING ON BACK TO SITTING ON SIDE OF FLAT BED WITH BEDRAILS: A LITTLE
STANDING UP FROM CHAIR USING ARMS: A LITTLE
HELP NEEDED FOR BATHING: A LITTLE
WALKING IN HOSPITAL ROOM: A LITTLE
MOBILITY SCORE: 17
MOVING TO AND FROM BED TO CHAIR: A LITTLE
STANDING UP FROM CHAIR USING ARMS: A LITTLE
WALKING IN HOSPITAL ROOM: A LITTLE
CLIMB 3 TO 5 STEPS WITH RAILING: A LITTLE
TURNING FROM BACK TO SIDE WHILE IN FLAT BAD: A LOT
MOVING FROM LYING ON BACK TO SITTING ON SIDE OF FLAT BED WITH BEDRAILS: A LITTLE
WALKING IN HOSPITAL ROOM: A LITTLE
WALKING IN HOSPITAL ROOM: A LITTLE
TURNING FROM BACK TO SIDE WHILE IN FLAT BAD: A LOT
MOBILITY SCORE: 17
DAILY ACTIVITIY SCORE: 18
CLIMB 3 TO 5 STEPS WITH RAILING: A LOT
MOBILITY SCORE: 17
STANDING UP FROM CHAIR USING ARMS: A LITTLE
PERSONAL GROOMING: A LITTLE
STANDING UP FROM CHAIR USING ARMS: A LITTLE
HELP NEEDED FOR BATHING: A LITTLE
TOILETING: A LITTLE
MOBILITY SCORE: 17
MOVING TO AND FROM BED TO CHAIR: A LITTLE
DRESSING REGULAR UPPER BODY CLOTHING: A LITTLE
MOVING TO AND FROM BED TO CHAIR: A LITTLE
DAILY ACTIVITIY SCORE: 21
DRESSING REGULAR LOWER BODY CLOTHING: A LITTLE
DAILY ACTIVITIY SCORE: 21
TOILETING: A LITTLE
MOVING FROM LYING ON BACK TO SITTING ON SIDE OF FLAT BED WITH BEDRAILS: A LITTLE
DRESSING REGULAR LOWER BODY CLOTHING: A LOT
HELP NEEDED FOR BATHING: A LITTLE
MOVING TO AND FROM BED TO CHAIR: A LITTLE
MOVING FROM LYING ON BACK TO SITTING ON SIDE OF FLAT BED WITH BEDRAILS: A LITTLE
TURNING FROM BACK TO SIDE WHILE IN FLAT BAD: A LITTLE
TOILETING: A LITTLE
TURNING FROM BACK TO SIDE WHILE IN FLAT BAD: A LOT

## 2024-03-15 ASSESSMENT — PAIN - FUNCTIONAL ASSESSMENT
PAIN_FUNCTIONAL_ASSESSMENT: 0-10
PAIN_FUNCTIONAL_ASSESSMENT: FLACC (FACE, LEGS, ACTIVITY, CRY, CONSOLABILITY)
PAIN_FUNCTIONAL_ASSESSMENT: 0-10

## 2024-03-15 ASSESSMENT — ACTIVITIES OF DAILY LIVING (ADL): HOME_MANAGEMENT_TIME_ENTRY: 39

## 2024-03-15 ASSESSMENT — PAIN SCALES - GENERAL
PAINLEVEL_OUTOF10: 3
PAINLEVEL_OUTOF10: 2
PAINLEVEL_OUTOF10: 3
PAINLEVEL_OUTOF10: 2
PAINLEVEL_OUTOF10: 0 - NO PAIN
PAINLEVEL_OUTOF10: 3

## 2024-03-15 ASSESSMENT — PAIN SCALES - WONG BAKER: WONGBAKER_NUMERICALRESPONSE: HURTS LITTLE BIT

## 2024-03-15 NOTE — CARE PLAN
The patient's goals for the shift include      The clinical goals for the shift include pain control, increased mobility    Over the shift, the patient did not make progress toward the following goals. Barriers to progression include . Recommendations to address these barriers include .

## 2024-03-15 NOTE — PROGRESS NOTES
"Physical Therapy    Physical Therapy Treatment    Patient Name: Jessy Mcdaniel  MRN: [id number]  Today's Date: 3/15/2024  Time Calculation  Start Time: 0914  Stop Time: 0941  Time Calculation (min): 27 min       Assessment/Plan   PT Assessment  End of Session Communication: Bedside nurse  Assessment Comment: She made adequate progress toward her functional mobility goals. Pt presented with decreased muscular strength/endurance, impaired R knee AROM, and increased assist required for functional mobility. She would benefit from continued skilled PT services for maximizing independence and safety with functional mobility prior to and after discharge.  End of Session Patient Position: Up in chair; Alarm off, not on at start of session (call light and needs within reach)     PT Plan  Treatment/Interventions: Bed mobility, Transfer training, Gait training, Stair training, Balance training, Strengthening, Range of motion, Therapeutic exercise, Therapeutic activity, Home exercise program  PT Plan: Skilled PT  PT Frequency: BID  PT Discharge Recommendations: Moderate intensity level of continued care  Equipment Recommended upon Discharge: Wheeled walker  PT Recommended Transfer Status: Assist x1 (FWW)  PT - OK to Discharge: Yes (with continued skilled PT services at next level of care)      General Visit Information:   PT  Visit  PT Received On: 03/15/24  General  Prior to Session Communication: Bedside nurse (RN cleared pt for participation.)  Patient Position Received: Up in chair, Alarm off, not on at start of session  General Comment: Pt agreed to tx and participated fully. \"I'm determined to get through this.\"    Subjective   Precautions:  Precautions  LE Weight Bearing Status: Weight Bearing as Tolerated (RLE)  Medical Precautions: Fall precautions  Post-Surgical Precautions: Right total knee precautions      Objective   Pain:  Pain Assessment  Pain Assessment: 0-10  Pain Score: 3  Pain Type: Surgical pain  Pain " Location: Knee  Pain Orientation: Right  Pain Interventions: Ambulation/increased activity, Repositioned    Activity Tolerance:  Activity Tolerance  Endurance: Tolerates 10 - 20 min exercise with multiple rests    Treatments:  Therapeutic Exercise  Therapeutic Exercise Performed: Yes   Therapeutic Exercise Activity 1: supine, BLE AP, QS x20 each  Therapeutic Exercise Activity 2: supine, R knee flex x20  Therapeutic Exercise Activity 3: supien, R SLR AAROM x10  Therapeutic Exercise Activity 4: R SAQ x20  Therapeutic Exercise Activity 5: seated, R knee flex x20  Therapeutic Exercise Activity 6: R LAQ x20    Bed Mobility  Bed Mobility: Yes  Bed Mobility 1  Bed Mobility 1: Supine to sitting  Level of Assistance 1: Close supervision  Bed Mobility Comments 1: HOB elevated, toward R side  Bed Mobility 2  Bed Mobility  2: Sitting to supine  Level of Assistance 2: Minimum assistance (assist to elevate RLE)  Bed Mobility Comments 2: bed flat/norail, toward R side  Bed Mobility 3  Bed Mobility 3: Scooting  Level of Assistance 3: Modified independent  Bed Mobility Comments 3: fwd scoot seated EOB    Ambulation/Gait Training  Ambulation/Gait Training Performed: Yes  Ambulation/Gait Training 1  Surface 1: Level tile  Device 1: Rolling walker  Assistance 1: Close supervision (cued for waker management with tight turns and increased bilateral step length)  Comments/Distance (ft) 1: 125 ft using minimal step through pattern with slowed velocity and discontinuous movement. Steady gait.    Transfers  Transfer: Yes  Transfer 1  Technique 1: Sit to stand  Transfer Device 1: Walker  Transfer Level of Assistance 1: Close supervision  Trials/Comments 1: x1 trial each from bedside chair and EOB  Transfers 2  Technique 2: Stand to sit  Transfer Device 2: Walker  Transfer Level of Assistance 2: Close supervision (cued at bedside chair for walker safety/BUE placement)  Trials/Comments 2: x1 trial at bedside chair and EOB  Transfers  3  Technique 3: Stand pivot, To right (EOB>chair)  Transfer Device 3: Walker  Transfer Level of Assistance 3: Close supervision    Outcome Measures:  [facility] Basic Mobility  Turning from your back to your side while in a flat bed without using bedrails: A little  Moving from lying on your back to sitting on the side of a flat bed without using bedrails: A lot  Moving to and from bed to chair (including a wheelchair): A little  Standing up from a chair using your arms (e.g. wheelchair or bedside chair): A little  To walk in hospital room: A little  Climbing 3-5 steps with railing: A little  Basic Mobility - Total Score: 17    Education Documentation  Precautions, taught by Katelynn Hartley PT at 3/15/2024 11:36 AM.  Learner: Patient  Readiness: Acceptance  Method: Explanation  Response: Demonstrated Understanding, Needs Reinforcement    Home Exercise Program, taught by Katelynn Hartley PT at 3/15/2024 11:36 AM.  Learner: Patient  Readiness: Acceptance  Method: Explanation  Response: Demonstrated Understanding, Needs Reinforcement    Mobility Training, taught by Katelynn Hartley PT at 3/15/2024 11:36 AM.  Learner: Patient  Readiness: Acceptance  Method: Explanation  Response: Demonstrated Understanding, Needs Reinforcement    Education Comments  No comments found.      OP EDUCATION:       Encounter Problems       Encounter Problems (Active)       Mobility       STG - Patient will ambulate >/=100 ft with FWW at mod I level. (Progressing)       Start:  03/13/24    Expected End:  03/26/24            STG - Patient will ascend and descend 4 stairs with bilateral rails and close S. (Not Progressing)       Start:  03/13/24    Expected End:  03/26/24               PT Transfers       STG - Patient to transfer supine<>sit at mod I level. (Progressing)       Start:  03/13/24    Expected End:  03/26/24            STG - Patient will transfer sit<>stand with FWW at mod I level. (Progressing)       Start:  03/13/24    Expected End:   03/26/24

## 2024-03-15 NOTE — CARE PLAN
Plan is discharge patient on Sunday to [facility]; 7000 started in [facility]      DISCHARGE PLAN: [facility] ON SUNDAY--DO NOT DISCHARGE PATIENT BEFORE SPEAKING WITH CARE COORDINATION; NEED THE GOLDENROD COMPLETED AND SIGNED; MUST FINISH THE 7000 IN [facility]; MUST COMMUNICATE WITH THE SNF WHEN THE PT IS READY AND ON THE WAY

## 2024-03-15 NOTE — NURSING NOTE
No change from previous assessment. Pt resting comfortably in bed with call light in reach. Dressing intact but to rt knee. Area still warm and tender with swelling.

## 2024-03-15 NOTE — PROGRESS NOTES
Jessy Mcdaniel is a 78 y.o. female on day 2 of admission presenting with Osteoarthritis of right knee. She is 2day s/p right TKA. Mild drop H/H 7.9/24.9, probable dilutional, She was given Saline bolus yesterday.  pt asymptomatic, vitals stable.       Subjective   Pt sitting in chair, reports less knee pain, increase swelling right leg s/p ace wrap removal, ultrasound negative DVT, antiembolic stocking had been applied with noted less swelling of leg. Hx atrial fib, resumed Eliquis BID. Denies peripheral paresthesia, reports ambulating in odell with therapy this morning, no further dizziness today, denies headache, fever, chills, chest pain, palpitation, sob, quevedo, cough, congestion, abd pain, tenderness, nausea, bloating, passing flatus, no bm. taking daily Miralax. Appetite good. Denies hematuria, dysuria, rashes lesions, erythema around incision. Scant drainage to dressing. From home with spouse, plans discharge rehab facility.           Objective     Physical Exam  Constitutional:       Appearance: Normal appearance.   HENT:      Head: Normocephalic and atraumatic.      Right Ear: External ear normal.      Left Ear: External ear normal.      Nose: Nose normal.      Mouth/Throat:      Pharynx: Oropharynx is clear.   Eyes:      Conjunctiva/sclera: Conjunctivae normal.      Pupils: Pupils are equal, round, and reactive to light.   Cardiovascular:      Rate and Rhythm: Normal rate and regular rhythm.      Heart sounds: Normal heart sounds. No murmur heard.  Pulmonary:      Effort: Pulmonary effort is normal. No respiratory distress.      Breath sounds: Normal breath sounds. No wheezing, rhonchi or rales.   Chest:      Chest wall: No tenderness.   Abdominal:      General: Bowel sounds are normal. There is no distension.      Palpations: Abdomen is soft.      Tenderness: There is no abdominal tenderness.   Musculoskeletal:         General: Swelling and tenderness present.      Cervical back: Normal range of motion and  "neck supple.      Right lower leg: Edema present.      Left lower leg: No edema.      Comments: Edema right leg,    Skin:     General: Skin is warm and dry.      Capillary Refill: Capillary refill takes 2 to 3 seconds.      Coloration: Skin is not pale.      Findings: No bruising, erythema, lesion or rash.      Comments: Scant serosanguinous drainage distal end right knee dressing   Neurological:      General: No focal deficit present.      Mental Status: She is alert and oriented to person, place, and time. Mental status is at baseline.   Psychiatric:         Mood and Affect: Mood normal.         Behavior: Behavior normal.         Last Recorded Vitals  Blood pressure (!) 114/35, pulse 71, temperature 36.9 °C (98.4 °F), temperature source Oral, resp. rate 17, height 1.651 m (5' 5\"), weight 80.8 kg (178 lb 2.1 oz), SpO2 94 %.  Intake/Output last 3 Shifts:  I/O last 3 completed shifts:  In: 2245 (27.8 mL/kg) [P.O.:1240; IV Piggyback:1005]  Out: 900 (11.1 mL/kg) [Urine:900 (0.3 mL/kg/hr)]  Weight: 80.8 kg     Relevant Results  Results for orders placed or performed during the hospital encounter of 03/13/24 (from the past 24 hour(s))   Comprehensive Metabolic Panel   Result Value Ref Range    Glucose 124 (H) 65 - 99 mg/dL    Sodium 134 133 - 145 mmol/L    Potassium 3.8 3.4 - 5.1 mmol/L    Chloride 101 97 - 107 mmol/L    Bicarbonate 24 24 - 31 mmol/L    Urea Nitrogen 20 8 - 25 mg/dL    Creatinine 1.00 0.40 - 1.60 mg/dL    eGFR 57 (L) >60 mL/min/1.73m*2    Calcium 7.9 (L) 8.5 - 10.4 mg/dL    Albumin 3.2 (L) 3.5 - 5.0 g/dL    Alkaline Phosphatase 41 35 - 125 U/L    Total Protein 5.3 (L) 5.9 - 7.9 g/dL    AST 17 5 - 40 U/L    Bilirubin, Total 0.3 0.1 - 1.2 mg/dL    ALT 7 5 - 40 U/L    Anion Gap 9 <=19 mmol/L   CBC and Auto Differential   Result Value Ref Range    WBC 6.8 4.4 - 11.3 x10*3/uL    nRBC 0.0 0.0 - 0.0 /100 WBCs    RBC 2.54 (L) 4.00 - 5.20 x10*6/uL    Hemoglobin 8.0 (L) 12.0 - 16.0 g/dL    Hematocrit 24.9 (L) 36.0 " - 46.0 %    MCV 98 80 - 100 fL    MCH 31.5 26.0 - 34.0 pg    MCHC 32.1 32.0 - 36.0 g/dL    RDW 13.6 11.5 - 14.5 %    Platelets 234 150 - 450 x10*3/uL    Neutrophils % 60.5 40.0 - 80.0 %    Immature Granulocytes %, Automated 0.3 0.0 - 0.9 %    Lymphocytes % 26.6 13.0 - 44.0 %    Monocytes % 12.6 2.0 - 10.0 %    Eosinophils % 0.0 0.0 - 6.0 %    Basophils % 0.0 0.0 - 2.0 %    Neutrophils Absolute 4.14 1.60 - 5.50 x10*3/uL    Immature Granulocytes Absolute, Automated 0.02 0.00 - 0.50 x10*3/uL    Lymphocytes Absolute 1.82 0.80 - 3.00 x10*3/uL    Monocytes Absolute 0.86 (H) 0.05 - 0.80 x10*3/uL    Eosinophils Absolute 0.00 0.00 - 0.40 x10*3/uL    Basophils Absolute 0.00 0.00 - 0.10 x10*3/uL   Basic Metabolic Panel   Result Value Ref Range    Glucose 104 (H) 65 - 99 mg/dL    Sodium 140 133 - 145 mmol/L    Potassium 4.1 3.4 - 5.1 mmol/L    Chloride 105 97 - 107 mmol/L    Bicarbonate 25 24 - 31 mmol/L    Urea Nitrogen 16 8 - 25 mg/dL    Creatinine 0.90 0.40 - 1.60 mg/dL    eGFR 65 >60 mL/min/1.73m*2    Calcium 8.2 (L) 8.5 - 10.4 mg/dL    Anion Gap 10 <=19 mmol/L   CBC and Auto Differential   Result Value Ref Range    WBC 5.6 4.4 - 11.3 x10*3/uL    nRBC 0.0 0.0 - 0.0 /100 WBCs    RBC 2.58 (L) 4.00 - 5.20 x10*6/uL    Hemoglobin 7.9 (L) 12.0 - 16.0 g/dL    Hematocrit 24.9 (L) 36.0 - 46.0 %    MCV 97 80 - 100 fL    MCH 30.6 26.0 - 34.0 pg    MCHC 31.7 (L) 32.0 - 36.0 g/dL    RDW 13.8 11.5 - 14.5 %    Platelets 223 150 - 450 x10*3/uL    Neutrophils % 58.4 40.0 - 80.0 %    Immature Granulocytes %, Automated 0.4 0.0 - 0.9 %    Lymphocytes % 28.6 13.0 - 44.0 %    Monocytes % 12.4 2.0 - 10.0 %    Eosinophils % 0.0 0.0 - 6.0 %    Basophils % 0.2 0.0 - 2.0 %    Neutrophils Absolute 3.24 1.60 - 5.50 x10*3/uL    Immature Granulocytes Absolute, Automated 0.02 0.00 - 0.50 x10*3/uL    Lymphocytes Absolute 1.59 0.80 - 3.00 x10*3/uL    Monocytes Absolute 0.69 0.05 - 0.80 x10*3/uL    Eosinophils Absolute 0.00 0.00 - 0.40 x10*3/uL    Basophils  Absolute 0.01 0.00 - 0.10 x10*3/uL   Lower extremity venous duplex right   Result Value Ref Range    BSA 1.92 m2      Current Facility-Administered Medications:     acetaminophen (Tylenol) tablet 650 mg, 650 mg, oral, q6h HESHAM, Nic Guerra MD, 650 mg at 03/15/24 0644    acetaminophen (Tylenol) tablet 650 mg, 650 mg, oral, q4h PRN, Nic Guerra MD    apixaban (Eliquis) tablet 2.5 mg, 2.5 mg, oral, q12h HESHAM, Nic Guerra MD, 2.5 mg at 03/15/24 0959    clotrimazole-betamethasone (Lotrisone) cream 1 Application, 1 Application, Topical, BID, Nic Guerra MD, 1 Application at 03/15/24 1016    flecainide (Tambocor) tablet 50 mg, 50 mg, oral, BID, Nic Guerra MD, 50 mg at 03/15/24 0959    [Held by provider] furosemide (Lasix) tablet 20 mg, 20 mg, oral, Daily, Nic Guerra MD    levothyroxine (Synthroid, Levoxyl) tablet 50 mcg, 50 mcg, oral, Daily, Nic Guerra MD, 50 mcg at 03/15/24 0644    metoclopramide (Reglan) injection 10 mg, 10 mg, intravenous, q6h PRN, Meliza Zuniga, APRN-CNP    morphine injection 2 mg, 2 mg, intravenous, q2h PRN, Nic Guerra MD    naloxone (Narcan) injection 0.2 mg, 0.2 mg, intravenous, q5 min PRN, Nic Guerra MD    naloxone (Narcan) injection 0.2 mg, 0.2 mg, intravenous, q5 min PRN, Nic Guerra MD    nystatin (Mycostatin) 100,000 unit/gram powder 1 Application, 1 Application, Topical, BID, Nic Guerra MD    oxyCODONE-acetaminophen (Percocet) 5-325 mg per tablet 1 tablet, 1 tablet, oral, q4h PRN, Nic Guerra MD    oxygen (O2) therapy, 2 L/min, inhalation, Continuous, Nic Guerra MD, 2 L/min at 03/13/24 1330    pantoprazole (ProtoNix) EC tablet 40 mg, 40 mg, oral, Daily before breakfast, LIAM Carbajal-CNP, 40 mg at 03/15/24 0644    polyethylene glycol (Glycolax, Miralax) packet 17 g, 17 g, oral, Daily, Nic Guerra MD, 17 g at 03/15/24 09    [Held by provider] triamterene-hydrochlorothiazid (Maxzide-25)  37.5-25 mg per tablet 1 tablet, 1 tablet, oral, q AM, Nic Guerra MD                              Assessment/Plan   Principal Problem:    Osteoarthritis of right knee  Active Problems:    Chronic pain of right knee    Primary osteoarthritis of right knee    Osteoarthritis of right knee, unspecified osteoarthritis type  S/p right TKA  Paroxysmal atrial fib  SHANDA-compliant with cpap  Post op normocytic anemia  Anemia of acute blood loss  Iron panel stable  IV Venofer 200mg IV X1 yesterday  Additional dose Venofer 200mg IV ordered today   S/P  B12 1000mcg IM X1  S/P Epogen [id number] subcutaneous X1  Lab to microsample blood draws-signage placed on door.  Monitor H/H  RBC transfusion 1unit as needed to maintain Hgb over 7        Bruna Be, APRN-CNP

## 2024-03-15 NOTE — PROGRESS NOTES
"Jessy Mcdaniel is a 78 y.o. female on day 2 of admission presenting with Osteoarthritis of right knee.    Subjective   Patient states she is tolerating her right knee pain well.  She has no chest pain or shortness of breath.       Objective   General: The patient is resting comfortably in bed.  She is not in any respiratory or pulmonary distress    Right knee and lower extremity: Dressing is dry.  Neurovascular is intact.  Right calf and thigh are nontender and nonswollen  Physical Exam    Last Recorded Vitals  Blood pressure (!) 119/44, pulse 70, temperature 36.7 °C (98.1 °F), temperature source Oral, resp. rate 13, height 1.651 m (5' 5\"), weight 80.8 kg (178 lb 2.1 oz), SpO2 98 %.  Intake/Output last 3 Shifts:  I/O last 3 completed shifts:  In: 2245 (27.8 mL/kg) [P.O.:1240; IV Piggyback:1005]  Out: 900 (11.1 mL/kg) [Urine:900 (0.3 mL/kg/hr)]  Weight: 80.8 kg     Relevant Results      Scheduled medications  acetaminophen, 650 mg, oral, q6h HESHAM  apixaban, 2.5 mg, oral, q12h HESHAM  clotrimazole-betamethasone, 1 Application, Topical, BID  flecainide, 50 mg, oral, BID  [Held by provider] furosemide, 20 mg, oral, Daily  levothyroxine, 50 mcg, oral, Daily  nystatin, 1 Application, Topical, BID  pantoprazole, 40 mg, oral, Daily before breakfast  polyethylene glycol, 17 g, oral, Daily  [Held by provider] triamterene-hydrochlorothiazid, 1 tablet, oral, q AM      Continuous medications  oxygen, 2 L/min      PRN medications  PRN medications: acetaminophen, metoclopramide, morphine, naloxone, naloxone, oxyCODONE-acetaminophen  Results for orders placed or performed during the hospital encounter of 03/13/24 (from the past 24 hour(s))   Comprehensive Metabolic Panel   Result Value Ref Range    Glucose 124 (H) 65 - 99 mg/dL    Sodium 134 133 - 145 mmol/L    Potassium 3.8 3.4 - 5.1 mmol/L    Chloride 101 97 - 107 mmol/L    Bicarbonate 24 24 - 31 mmol/L    Urea Nitrogen 20 8 - 25 mg/dL    Creatinine 1.00 0.40 - 1.60 mg/dL    eGFR 57 " (L) >60 mL/min/1.73m*2    Calcium 7.9 (L) 8.5 - 10.4 mg/dL    Albumin 3.2 (L) 3.5 - 5.0 g/dL    Alkaline Phosphatase 41 35 - 125 U/L    Total Protein 5.3 (L) 5.9 - 7.9 g/dL    AST 17 5 - 40 U/L    Bilirubin, Total 0.3 0.1 - 1.2 mg/dL    ALT 7 5 - 40 U/L    Anion Gap 9 <=19 mmol/L   CBC and Auto Differential   Result Value Ref Range    WBC 6.8 4.4 - 11.3 x10*3/uL    nRBC 0.0 0.0 - 0.0 /100 WBCs    RBC 2.54 (L) 4.00 - 5.20 x10*6/uL    Hemoglobin 8.0 (L) 12.0 - 16.0 g/dL    Hematocrit 24.9 (L) 36.0 - 46.0 %    MCV 98 80 - 100 fL    MCH 31.5 26.0 - 34.0 pg    MCHC 32.1 32.0 - 36.0 g/dL    RDW 13.6 11.5 - 14.5 %    Platelets 234 150 - 450 x10*3/uL    Neutrophils % 60.5 40.0 - 80.0 %    Immature Granulocytes %, Automated 0.3 0.0 - 0.9 %    Lymphocytes % 26.6 13.0 - 44.0 %    Monocytes % 12.6 2.0 - 10.0 %    Eosinophils % 0.0 0.0 - 6.0 %    Basophils % 0.0 0.0 - 2.0 %    Neutrophils Absolute 4.14 1.60 - 5.50 x10*3/uL    Immature Granulocytes Absolute, Automated 0.02 0.00 - 0.50 x10*3/uL    Lymphocytes Absolute 1.82 0.80 - 3.00 x10*3/uL    Monocytes Absolute 0.86 (H) 0.05 - 0.80 x10*3/uL    Eosinophils Absolute 0.00 0.00 - 0.40 x10*3/uL    Basophils Absolute 0.00 0.00 - 0.10 x10*3/uL   Basic Metabolic Panel   Result Value Ref Range    Glucose 104 (H) 65 - 99 mg/dL    Sodium 140 133 - 145 mmol/L    Potassium 4.1 3.4 - 5.1 mmol/L    Chloride 105 97 - 107 mmol/L    Bicarbonate 25 24 - 31 mmol/L    Urea Nitrogen 16 8 - 25 mg/dL    Creatinine 0.90 0.40 - 1.60 mg/dL    eGFR 65 >60 mL/min/1.73m*2    Calcium 8.2 (L) 8.5 - 10.4 mg/dL    Anion Gap 10 <=19 mmol/L   CBC and Auto Differential   Result Value Ref Range    WBC 5.6 4.4 - 11.3 x10*3/uL    nRBC 0.0 0.0 - 0.0 /100 WBCs    RBC 2.58 (L) 4.00 - 5.20 x10*6/uL    Hemoglobin 7.9 (L) 12.0 - 16.0 g/dL    Hematocrit 24.9 (L) 36.0 - 46.0 %    MCV 97 80 - 100 fL    MCH 30.6 26.0 - 34.0 pg    MCHC 31.7 (L) 32.0 - 36.0 g/dL    RDW 13.8 11.5 - 14.5 %    Platelets 223 150 - 450 x10*3/uL     Neutrophils % 58.4 40.0 - 80.0 %    Immature Granulocytes %, Automated 0.4 0.0 - 0.9 %    Lymphocytes % 28.6 13.0 - 44.0 %    Monocytes % 12.4 2.0 - 10.0 %    Eosinophils % 0.0 0.0 - 6.0 %    Basophils % 0.2 0.0 - 2.0 %    Neutrophils Absolute 3.24 1.60 - 5.50 x10*3/uL    Immature Granulocytes Absolute, Automated 0.02 0.00 - 0.50 x10*3/uL    Lymphocytes Absolute 1.59 0.80 - 3.00 x10*3/uL    Monocytes Absolute 0.69 0.05 - 0.80 x10*3/uL    Eosinophils Absolute 0.00 0.00 - 0.40 x10*3/uL    Basophils Absolute 0.01 0.00 - 0.10 x10*3/uL   Lower extremity venous duplex right   Result Value Ref Range    BSA 1.92 m2                            Assessment/Plan   Principal Problem:    Osteoarthritis of right knee  Active Problems:    Chronic pain of right knee    Primary osteoarthritis of right knee    Osteoarthritis of right knee, unspecified osteoarthritis type    Satisfactory post right total knee replacement    Plan options are discussed with the patient in detail.  Physical therapy has recommended care at a skilled level and the patient and her  strongly agree.  Discharge home with home health is discussed and the patient and her  both refused discharge home and wish to go to a skilled facility.  I have talked with care coordination and arrangements have been made.  This patient will have required 3 midnights in the hospital as an inpatient and will be discharged to a skilled nursing facility on 3-.  Discharge orders have been completed.       I spent 30 minutes in the professional and overall care of this patient.      Nic Guerra MD

## 2024-03-15 NOTE — PROGRESS NOTES
Jessy Calzada is a 78 y.o. female on day 2 of admission presenting with Osteoarthritis of right knee.      Subjective   POD #2 right total knee arthroplasty.  Sitting up in chair with feet elevated during exam.  Denies shortness of breath.  Denies chest pain, denies dizziness.  No acute distress       Objective     Last Recorded Vitals  BP (!) 114/35 (BP Location: Left arm, Patient Position: Lying)   Pulse 71   Temp 36.9 °C (98.4 °F) (Oral)   Resp 17   Wt 80.8 kg (178 lb 2.1 oz)   SpO2 94%   Intake/Output last 3 Shifts:    Intake/Output Summary (Last 24 hours) at 3/15/2024 1025  Last data filed at 3/15/2024 0806  Gross per 24 hour   Intake 2405 ml   Output 350 ml   Net 2055 ml         Admission Weight  Weight: 80.8 kg (178 lb 2.1 oz) (03/13/24 1622)    Daily Weight  03/13/24 : 80.8 kg (178 lb 2.1 oz)    Image Results  Lower extremity venous duplex right  Preliminary [place]iology [place] [address]             Phone [phone]             Preliminary Vascular Lab Report     [facility] US LOWER EXTREMITY VENOUS DUPLEX RIGHT       Patient Name:     JESSY KITCHEN       Jose Physician:  [id number] Mayuri Sotelo MD,                    CALZADA                            RPVI  Study Date:       3/15/2024     Ordering Provider:  [id number] CHARI ZARAGOZA  MRN/PID:          [id number]      Fellow:  Accession#:       [id number]  Technologist:       Jazlyn Calderón RVT  YOB: 1945     Technologist 2:  Gender:           F             Encounter#:         [id number]  Admission Status: Inpatient     Location Performed: [facility]       Diagnosis/ICD: Localized (leg) edema-R60.0  Indication:    Limb swelling  CPT Codes:     42538 Peripheral venous duplex scan for DVT Limited       Pertinent History: S/p right total knee arthroplasty 3/13/2024.       PRELIMINARY CONCLUSIONS:     Right Lower Venous: No evidence of acute deep vein thrombus visualized in  the right lower extremity. Additional Findings; Cystic Structure popliteal fossa measuring approximately 2.9 x 1.5 x 0.6cm.  Left Lower Venous: Left common femoral vein is negative for deep vein thrombus.     Imaging & Doppler Findings:     Right                 Compressible Thrombus        Flow  Distal External Iliac                None       Pulsatile  CFV                       Yes        None       Pulsatile  PFV                       Yes        None  FV Proximal               Yes        None       Pulsatile  FV Mid                    Yes        None  FV Distal                 Yes        None  Popliteal                 Yes        None   Spontaneous/Phasic  Peroneal                  Yes        None  PTV                       Yes        None       Left Compress Thrombus        Flow  CFV    Yes      None   Spontaneous/Phasic    VASCULAR PRELIMINARY REPORT  completed by Jazlyn Calderón RVT on 3/15/2024 at 9:18:14 AM       ** Final **      Physical Exam  Vitals and nursing note reviewed.   Constitutional:       Appearance: Normal appearance. She is normal weight.   HENT:      Head: Normocephalic and atraumatic.      Nose: Nose normal.      Mouth/Throat:      Mouth: Mucous membranes are moist.   Eyes:      Extraocular Movements: Extraocular movements intact.      Pupils: Pupils are equal, round, and reactive to light.   Cardiovascular:      Rate and Rhythm: Normal rate and regular rhythm.      Pulses: Normal pulses.      Heart sounds: Normal heart sounds.   Pulmonary:      Effort: Pulmonary effort is normal.      Breath sounds: Normal breath sounds.   Abdominal:      General: Bowel sounds are normal.      Palpations: Abdomen is soft.   Musculoskeletal:         General: Tenderness present. Normal range of motion.      Cervical back: Normal range of motion and neck supple.      Right lower leg: Edema present.      Comments: Range of motion right lower extremity limited due to pain   Skin:     General: Skin is warm and dry.       Capillary Refill: Capillary refill takes less than 2 seconds.      Comments: Right knee dressing clean dry intact   Neurological:      General: No focal deficit present.      Mental Status: She is alert and oriented to person, place, and time.   Psychiatric:         Mood and Affect: Mood normal.         Behavior: Behavior normal.         Relevant Results               Assessment/Plan      Principal Problem:    Osteoarthritis of right knee  Active Problems:    Chronic pain of right knee    Primary osteoarthritis of right knee    Osteoarthritis of right knee, unspecified osteoarthritis type    POD #2 Right Total knee arthroplasty  As managed per primary service  PT OT  Incentive spirometer teaching  DVT prophylaxis  As needed pain management regimen  On room air    Right lower extremity peripheral edema  With some mild edema right lower extremity, ultrasound right lower extremity negative for DVT.      History of Atrial fibrillation, flutter  Normal sinus rhythm currently   continuous telemetry  Continue home flecainide  Low-dose Eliquis     Hypothyroidism  Continue home Synthroid     History of hypertension  Hold home Maxizde for now, hold home Lasix for now  Monitor vital signs    Orthostatic hypotension  Resolved    Acute blood loss anemia  Likely related to surgery  Give dose of IV Venofer  Monitor CBC    Obstructive sleep apnea  CPAP q. nightly, patient has brought in her own device     Osteoarthritis  As needed Tylenol, PT OT, fall precautions     DVT/GI  Low-dose Eliquis, PPI             Addendum 3/14/2024 1443: Has received 1 L fluid bolus, IV Venofer and epogen, vitb12.  Orthostatic hypotension has resolved.  Vital signs laying was 108/45 heart rate 67, sitting 124/53 heart rate 74, and standing 132/43 heart rate 71.   3/15/2024: Awaiting SNF placement, bed will be available Sunday.    LIAM Carbajal-CNP

## 2024-03-15 NOTE — NURSING NOTE
Pt has a swollen, tender, and very warm RLE. Dr. Benavidez notified and ultrasound an labs ordered. Pt in no distress otherwise.

## 2024-03-15 NOTE — NURSING NOTE
Bedside shift report completed. Pt awake in bed. Awaiting US to RLE. Pt aware of POC. Call light within reach.

## 2024-03-15 NOTE — PROGRESS NOTES
Physical Therapy    Physical Therapy Treatment    Patient Name: Jessy Mcdaniel  MRN: [id number]  Today's Date: 3/15/2024  Time Calculation  Start Time: 1422  Stop Time: 1441  Time Calculation (min): 19 min       Assessment/Plan   PT Assessment  Assessment Comment: She made adequate progress toward her functional mobility goals. Pt presented with decreased muscular strength/endurance, impaired R knee AROM, and increased assist required for functional mobility. She would benefit from continued skilled PT services for maximizing independence and safety with functional mobility prior to and after discharge.  End of Session Patient Position: Bed, 3 rail up, Alarm off, not on at start of session (call light and needs within reach)     PT Plan  Treatment/Interventions: Bed mobility, Transfer training, Gait training, Stair training, Balance training, Strengthening, Range of motion, Therapeutic exercise, Therapeutic activity, Home exercise program  PT Plan: Skilled PT  PT Frequency: BID  PT Discharge Recommendations: Moderate intensity level of continued care  Equipment Recommended upon Discharge: Wheeled walker  PT Recommended Transfer Status: Assist x1 (FWW)  PT - OK to Discharge: Yes (with continued skilled PT services at next level of care)      General Visit Information:   PT  Visit  PT Received On: 03/15/24  General  Prior to Session Communication: Bedside nurse (RN cleared pt for participation.)  Patient Position Received: Bed, 3 rail up, Alarm off, not on at start of session  General Comment: Pt agreed to tx but requested ther-ex only due to fatigue and sitting up in chair for 2 hours earlier today.    Subjective   Precautions:  Precautions  LE Weight Bearing Status: Weight Bearing as Tolerated (RLE)  Medical Precautions: Fall precautions  Post-Surgical Precautions: Right total knee precautions    Objective   Pain:  Pain Assessment  Pain Assessment: 0-10  Pain Score: 2  Pain Type: Surgical pain  Pain Location:  Knee  Pain Orientation: Right  Pain Interventions: Cold applied    Activity Tolerance:  Activity Tolerance  Endurance: Tolerates 10 - 20 min exercise with multiple rests  Treatments:  Therapeutic Exercise  Therapeutic Exercise Performed: Yes (supine)  Therapeutic Exercise Activity 1: bilat AP, QS x20 each  Therapeutic Exercise Activity 2: R knee flex x20  Therapeutic Exercise Activity 3: R SAQ x20  Therapeutic Exercise Activity 4: R SLR x10, AAROM  Therapeutic Exercise Activity 5: R LAQ    Bed Mobility  Bed Mobility: No (Pt declined citing fatigue.)    Outcome Measures:  [facility] Basic Mobility  Turning from your back to your side while in a flat bed without using bedrails: A little  Moving from lying on your back to sitting on the side of a flat bed without using bedrails: A lot  Moving to and from bed to chair (including a wheelchair): A little  Standing up from a chair using your arms (e.g. wheelchair or bedside chair): A little  To walk in hospital room: A little  Climbing 3-5 steps with railing: A little  Basic Mobility - Total Score: 17    Education Documentation  Precautions, taught by Katelynn Hartley PT at 3/15/2024  3:13 PM.  Learner: Patient  Readiness: Acceptance  Method: Explanation  Response: Needs Reinforcement, Verbalizes Understanding, Demonstrated Understanding    Home Exercise Program, taught by Katelynn Hartley PT at 3/15/2024  3:13 PM.  Learner: Patient  Readiness: Acceptance  Method: Explanation  Response: Needs Reinforcement, Verbalizes Understanding, Demonstrated Understanding    Mobility Training, taught by Katelynn Hartley PT at 3/15/2024  3:13 PM.  Learner: Patient  Readiness: Acceptance  Method: Explanation  Response: Needs Reinforcement, Verbalizes Understanding, Demonstrated Understanding    Precautions, taught by Katelynn Hartley PT at 3/15/2024 11:36 AM.  Learner: Patient  Readiness: Acceptance  Method: Explanation  Response: Demonstrated Understanding, Needs Reinforcement    Home Exercise  Program, taught by Katelynn Hartley, PT at 3/15/2024 11:36 AM.  Learner: Patient  Readiness: Acceptance  Method: Explanation  Response: Demonstrated Understanding, Needs Reinforcement    Mobility Training, taught by Katelynn Hartley PT at 3/15/2024 11:36 AM.  Learner: Patient  Readiness: Acceptance  Method: Explanation  Response: Demonstrated Understanding, Needs Reinforcement    Education Comments  No comments found.        OP EDUCATION:       Encounter Problems       Encounter Problems (Active)       Mobility       STG - Patient will ambulate >/=100 ft with FWW at mod I level. (Progressing)       Start:  03/13/24    Expected End:  03/26/24            STG - Patient will ascend and descend 4 stairs with bilateral rails and close S. (Not Progressing)       Start:  03/13/24    Expected End:  03/26/24               PT Transfers       STG - Patient to transfer supine<>sit at mod I level. (Progressing)       Start:  03/13/24    Expected End:  03/26/24            STG - Patient will transfer sit<>stand with FWW at mod I level. (Progressing)       Start:  03/13/24    Expected End:  03/26/24

## 2024-03-15 NOTE — DISCHARGE SUMMARY
Discharge Diagnosis  Osteoarthritis of right knee    Issues Requiring Follow-Up  Rehabilitation after right total knee replacement    Test Results Pending At Discharge  Pending Labs       No current pending labs.            Hospital Course   Right total knee replacement on 3- with subsequent rehabilitation    Pertinent Physical Exam At Time of Discharge  Physical Exam    Home Medications     Medication List      START taking these medications     oxyCODONE-acetaminophen 5-325 mg tablet; Commonly known as: Percocet;   Take 1 tablet by mouth every 6 hours if needed for severe pain (7 - 10)   for up to 7 days.   pantoprazole 40 mg EC tablet; Commonly known as: ProtoNix; Take 1 tablet   (40 mg) by mouth once daily in the morning. Take before meals. Do not   crush, chew, or split. Do not start before March 16, 2024.; Start taking   on: March 16, 2024     CHANGE how you take these medications     Synthroid 50 mcg tablet; Generic drug: levothyroxine; Take 1 tablet (50   mcg) by mouth once daily. 1 tab alt with 1/2 daily; What changed:   additional instructions   triamterene-hydrochlorothiazid 37.5-25 mg tablet; Commonly known as:   Maxzide-25; Take 0.5 tablets by mouth once daily in the morning.; What   changed: how much to take     CONTINUE taking these medications     clotrimazole-betamethasone cream; Commonly known as: Lotrisone; Apply 1   Application topically 2 times a day.   Eliquis 5 mg tablet; Generic drug: apixaban; Take 1 tablet (5 mg) by   mouth 2 times a day.   flecainide 50 mg tablet; Commonly known as: Tambocor; Take 1 tablet (50   mg) by mouth 2 times a day.   furosemide 20 mg tablet; Commonly known as: Lasix     STOP taking these medications     CALCIUM 600 + D(3) ORAL   chlorhexidine 0.12 % solution; Commonly known as: Peridex   cholecalciferol 50 MCG (2000 UT) tablet; Commonly known as: Vitamin D-3   estradiol 10 mcg tablet vaginal tablet; Commonly known as: Vagifem   FISH OIL ORAL   nystatin  100,000 unit/gram powder; Commonly known as: Mycostatin   Prolia 60 mg/mL syringe; Generic drug: denosumab   simvastatin 20 mg tablet; Commonly known as: Zocor       Outpatient Follow-Up  Future Appointments   Date Time Provider Department Center   4/5/2024 10:00 AM Teresa Crum PA-C [id number] [facility]   6/18/2024  8:45 AM Ysabel Varela MD [id number] [facility]   6/20/2024  9:00 AM Duglas Aguila MD [id number] None       Nic Guerra MD

## 2024-03-15 NOTE — PROGRESS NOTES
Occupational Therapy    OT Treatment    Patient Name: Jessy Mcdaniel  MRN: [id number]  Today's Date: 3/15/2024  Time Calculation  Start Time: 1126  Stop Time: 1205  Time Calculation (min): 39 min         Assessment:  OT Assessment: Progress made as expected towards OT goals. Continue with current OT POC to increase strength, balance and functional tolerance to maximize safety and independence during ADL/IADLs in the least restrictive environment.  Evaluation/Treatment Tolerance: Patient tolerated treatment well  End of Session Communication: Bedside nurse  End of Session Patient Position: Alarm off, not on at start of session (all needs in reach, no chair alarms present)  OT Assessment Results: Decreased ADL status, Decreased upper extremity strength, Decreased safe judgment during ADL, Decreased cognition, Decreased endurance, Decreased functional mobility, Decreased gross motor control, Decreased IADLs  Evaluation/Treatment Tolerance: Patient tolerated treatment well  Plan:  Treatment Interventions: ADL retraining, Functional transfer training, UE strengthening/ROM, Endurance training, Patient/family training, Equipment evaluation/education  OT Frequency: 5 times per week  OT Discharge Recommendations: Low intensity level of continued care (Collaborated with OTR- pt has made progress towards OT goals and would benefit from continued OT upon discharge at low intensity to maximize safety and independence during ADLs in the least restrictive environment.)  Equipment Recommended upon Discharge: Wheeled walker  OT - OK to Discharge: Yes  Treatment Interventions: ADL retraining, Functional transfer training, UE strengthening/ROM, Endurance training, Patient/family training, Equipment evaluation/education    Subjective   Previous Visit Info:  OT Last Visit  OT Received On: 03/15/24  General:  General  Reason for Referral: impaired ADLs s/p R [id number]  Past Medical History Relevant to Rehab: A-fib, A-flutter, colon cancer,  HTN, OA, osteoporosis, premature atrial contraction, sleep apnea (on CPAP), L knee menisectomy & Baker's cyst removal, bilat breast biopsy  Family/Caregiver Present: Yes  Caregiver Feedback: spouse present- questions, comments and concerns addressed regarding OT.  Prior to Session Communication: Bedside nurse  Patient Position Received: Up in chair, Alarm off, not on at start of session  General Comment: Pt cleared for OT session per nursing, pleasant and cooperative this date.  Precautions:  Hearing/Visual Limitations: wears bifocals; hearing WFL  LE Weight Bearing Status: Weight Bearing as Tolerated (RLE)  Medical Precautions: Fall precautions  Post-Surgical Precautions: Right total knee precautions  Precautions Comment: Pt able to independently recall knee precautions.  Vital Signs:     Pain:  Pain Assessment  Pain Assessment: 0-10  Pain Score: 3  Pain Type: Surgical pain  Pain Location: Knee  Pain Orientation: Right  Clinical Progression: Not changed  Pain Interventions: Repositioned, Ambulation/increased activity, Cold pack    Objective    Cognition:  Cognition  Overall Cognitive Status: Within Functional Limits  Safety/Judgement: Exceptions to WFL  Insight: Mild  Impulsive: Within functional limits  Task Initiation: Initiates with cues  Processing Speed: Delayed  Coordination:  Movements are Fluid and Coordinated: No  Upper Body Coordination: WFL  Lower Body Coordination: slower rate of movement RLE>LLE  Activities of Daily Living: Grooming  Grooming Comments: hand hygiene completed standing at sink with FWW and close supervision- min verbal cues required for AD management to increase safety and decrease risk of falls.    LE Dressing  LE Dressing Comments: pt able to thread BLE through pants from seated position with close supervision and increased time. Close supervision provided in standing with FWW to don pants fully over hips. Verbal instruction and demonstration provided on adaptive bag technique for  donning of compression stockings, pt and spouse verbalized understanding.    Toileting  Toileting Comments: all aspects of toileting completed seated on commode with close supervision  Functional Standing Tolerance:     Bed Mobility/Transfers: Bed Mobility  Bed Mobility: No    Transfers  Transfer: Yes  Transfer 1  Trials/Comments 1: sit<>stands completed from standard EOB, chair and toilet heights with FWW and close supervision.    Toilet Transfers  Toilet Transfer From: Chair  Toilet Transfer Type: To and from  Toilet Transfer to: Standard toilet  Toilet Transfer Technique: Ambulating  Toilet Transfers: Supervision  Toilet Transfers Comments: with FWW, increased time required to complete  Tub Transfers  Tub Transfer From: Wheelchair  Tub Transfer Type: To and from  Tub Transfer to: Standing  Tub Transfer Technique: Lateral  Tub Transfers: Contact guard  Tub Transfers Comments: tub transfer simulation completed in standing with CGA- verbal instruction and demonstration provided for sequencing to maximize safety and potential for transfer completion. Decreased carry-over observed requiring increased time and education. Continued simulations recommended prior to discharge to decrease risk of falls.      Functional Mobility:  Functional Mobility  Functional Mobility Performed: Yes  Functional Mobility 1  Surface 1: Level tile  Device 1: Rolling walker  Assistance 1: Close supervision  Comments 1: Functional mobility completed at household distances with FWW and close supervision in preparation for participation in ADLs and transfer simulations.      Outcome Measures:[facility] Daily Activity  Putting on and taking off regular lower body clothing: A little  Bathing (including washing, rinsing, drying): A little  Putting on and taking off regular upper body clothing: None  Toileting, which includes using toilet, bedpan or urinal: A little  Taking care of personal grooming such as brushing teeth: None  Eating Meals:  None  Daily Activity - Total Score: 21        Education Documentation  Body Mechanics, taught by JORDYN Orellana at 3/15/2024  6:37 PM.  Learner: Significant Other, Patient  Readiness: Acceptance  Method: Explanation, Demonstration  Response: Verbalizes Understanding, Needs Reinforcement    Precautions, taught by JORDYN Orellana at 3/15/2024  6:37 PM.  Learner: Significant Other, Patient  Readiness: Acceptance  Method: Explanation, Demonstration  Response: Verbalizes Understanding, Needs Reinforcement    ADL Training, taught by JORDYN Orellana at 3/15/2024  6:37 PM.  Learner: Significant Other, Patient  Readiness: Acceptance  Method: Explanation, Demonstration  Response: Verbalizes Understanding, Needs Reinforcement      Education Comments  Education provided on role of OT/POC, safety awareness throughout functional tasks/transfers, importance of activity/ rest routine, and use of call light for assistance. Questions, comments and concerns addressed regarding OT.      Goals:  Encounter Problems       Encounter Problems (Active)       OT Goals       ADLs (Progressing)       Start:  03/13/24    Expected End:  03/30/24       Pt will complete ADL tasks with Mod I, using AE as needed, in order to complete self-care tasks.           Functional mobility (Progressing)       Start:  03/13/24    Expected End:  03/30/24       Pt will perform functional mobility household distance at mod ind level with RW           Functional transfers (Progressing)       Start:  03/13/24    Expected End:  03/30/24       Pt will perform functional transfers at mod ind level with RW

## 2024-03-16 LAB
BASOPHILS # BLD AUTO: 0.01 X10*3/UL (ref 0–0.1)
BASOPHILS NFR BLD AUTO: 0.2 %
EOSINOPHIL # BLD AUTO: 0.03 X10*3/UL (ref 0–0.4)
EOSINOPHIL NFR BLD AUTO: 0.5 %
ERYTHROCYTE [DISTWIDTH] IN BLOOD BY AUTOMATED COUNT: 14 % (ref 11.5–14.5)
HCT VFR BLD AUTO: 24.3 % (ref 36–46)
HGB BLD-MCNC: 7.6 G/DL (ref 12–16)
HOLD SPECIMEN: NORMAL
IMM GRANULOCYTES # BLD AUTO: 0.06 X10*3/UL (ref 0–0.5)
IMM GRANULOCYTES NFR BLD AUTO: 1 % (ref 0–0.9)
LYMPHOCYTES # BLD AUTO: 2.02 X10*3/UL (ref 0.8–3)
LYMPHOCYTES NFR BLD AUTO: 34.3 %
MCH RBC QN AUTO: 30.6 PG (ref 26–34)
MCHC RBC AUTO-ENTMCNC: 31.3 G/DL (ref 32–36)
MCV RBC AUTO: 98 FL (ref 80–100)
MONOCYTES # BLD AUTO: 0.56 X10*3/UL (ref 0.05–0.8)
MONOCYTES NFR BLD AUTO: 9.5 %
NEUTROPHILS # BLD AUTO: 3.21 X10*3/UL (ref 1.6–5.5)
NEUTROPHILS NFR BLD AUTO: 54.5 %
NRBC BLD-RTO: 0 /100 WBCS (ref 0–0)
PLATELET # BLD AUTO: 212 X10*3/UL (ref 150–450)
RBC # BLD AUTO: 2.48 X10*6/UL (ref 4–5.2)
WBC # BLD AUTO: 5.9 X10*3/UL (ref 4.4–11.3)

## 2024-03-16 PROCEDURE — 97530 THERAPEUTIC ACTIVITIES: CPT | Mod: GO,CO

## 2024-03-16 PROCEDURE — 1200000002 HC GENERAL ROOM WITH TELEMETRY DAILY

## 2024-03-16 PROCEDURE — 2500000001 HC RX 250 WO HCPCS SELF ADMINISTERED DRUGS (ALT 637 FOR MEDICARE OP)

## 2024-03-16 PROCEDURE — 2500000004 HC RX 250 GENERAL PHARMACY W/ HCPCS (ALT 636 FOR OP/ED)

## 2024-03-16 PROCEDURE — 97116 GAIT TRAINING THERAPY: CPT | Mod: GP,CQ

## 2024-03-16 PROCEDURE — 2500000001 HC RX 250 WO HCPCS SELF ADMINISTERED DRUGS (ALT 637 FOR MEDICARE OP): Performed by: ORTHOPAEDIC SURGERY

## 2024-03-16 PROCEDURE — 97535 SELF CARE MNGMENT TRAINING: CPT | Mod: GO,CO

## 2024-03-16 PROCEDURE — 85025 COMPLETE CBC W/AUTO DIFF WBC: CPT

## 2024-03-16 PROCEDURE — 36415 COLL VENOUS BLD VENIPUNCTURE: CPT

## 2024-03-16 PROCEDURE — 97530 THERAPEUTIC ACTIVITIES: CPT | Mod: GP,CQ

## 2024-03-16 PROCEDURE — 97110 THERAPEUTIC EXERCISES: CPT | Mod: GP,CQ

## 2024-03-16 RX ADMIN — PANTOPRAZOLE SODIUM 40 MG: 40 TABLET, DELAYED RELEASE ORAL at 06:21

## 2024-03-16 RX ADMIN — CLOTRIMAZOLE AND BETAMETHASONE DIPROPIONATE 1 APPLICATION: 10; .5 CREAM TOPICAL at 20:59

## 2024-03-16 RX ADMIN — ACETAMINOPHEN 650 MG: 325 TABLET ORAL at 23:11

## 2024-03-16 RX ADMIN — LEVOTHYROXINE SODIUM 50 MCG: 0.05 TABLET ORAL at 06:21

## 2024-03-16 RX ADMIN — IRON SUCROSE 200 MG: 20 INJECTION, SOLUTION INTRAVENOUS at 12:21

## 2024-03-16 RX ADMIN — ACETAMINOPHEN 650 MG: 325 TABLET ORAL at 06:21

## 2024-03-16 RX ADMIN — ACETAMINOPHEN 650 MG: 325 TABLET ORAL at 18:25

## 2024-03-16 RX ADMIN — CLOTRIMAZOLE AND BETAMETHASONE DIPROPIONATE 1 APPLICATION: 10; .5 CREAM TOPICAL at 09:26

## 2024-03-16 RX ADMIN — APIXABAN 2.5 MG: 2.5 TABLET, FILM COATED ORAL at 20:58

## 2024-03-16 RX ADMIN — FLECAINIDE ACETATE 50 MG: 50 TABLET ORAL at 09:26

## 2024-03-16 RX ADMIN — ACETAMINOPHEN 650 MG: 325 TABLET ORAL at 12:19

## 2024-03-16 RX ADMIN — FLECAINIDE ACETATE 50 MG: 50 TABLET ORAL at 20:58

## 2024-03-16 RX ADMIN — APIXABAN 2.5 MG: 2.5 TABLET, FILM COATED ORAL at 09:26

## 2024-03-16 ASSESSMENT — COGNITIVE AND FUNCTIONAL STATUS - GENERAL
MOBILITY SCORE: 18
MOVING TO AND FROM BED TO CHAIR: A LITTLE
DAILY ACTIVITIY SCORE: 21
CLIMB 3 TO 5 STEPS WITH RAILING: A LITTLE
MOBILITY SCORE: 18
MOVING TO AND FROM BED TO CHAIR: A LITTLE
HELP NEEDED FOR BATHING: A LITTLE
STANDING UP FROM CHAIR USING ARMS: A LITTLE
HELP NEEDED FOR BATHING: A LITTLE
DAILY ACTIVITIY SCORE: 21
DAILY ACTIVITIY SCORE: 21
DRESSING REGULAR LOWER BODY CLOTHING: A LITTLE
STANDING UP FROM CHAIR USING ARMS: A LITTLE
MOBILITY SCORE: 18
WALKING IN HOSPITAL ROOM: A LITTLE
TURNING FROM BACK TO SIDE WHILE IN FLAT BAD: A LITTLE
STANDING UP FROM CHAIR USING ARMS: A LITTLE
CLIMB 3 TO 5 STEPS WITH RAILING: A LITTLE
MOVING FROM LYING ON BACK TO SITTING ON SIDE OF FLAT BED WITH BEDRAILS: A LITTLE
TOILETING: A LITTLE
DRESSING REGULAR LOWER BODY CLOTHING: A LITTLE
DAILY ACTIVITIY SCORE: 21
CLIMB 3 TO 5 STEPS WITH RAILING: A LITTLE
TOILETING: A LITTLE
CLIMB 3 TO 5 STEPS WITH RAILING: A LITTLE
STANDING UP FROM CHAIR USING ARMS: A LITTLE
MOVING TO AND FROM BED TO CHAIR: A LITTLE
HELP NEEDED FOR BATHING: A LITTLE
MOVING FROM LYING ON BACK TO SITTING ON SIDE OF FLAT BED WITH BEDRAILS: A LITTLE
WALKING IN HOSPITAL ROOM: A LITTLE
DRESSING REGULAR LOWER BODY CLOTHING: A LITTLE
MOBILITY SCORE: 17
MOVING TO AND FROM BED TO CHAIR: A LITTLE
TURNING FROM BACK TO SIDE WHILE IN FLAT BAD: A LITTLE
TURNING FROM BACK TO SIDE WHILE IN FLAT BAD: A LITTLE
TURNING FROM BACK TO SIDE WHILE IN FLAT BAD: A LOT
DRESSING REGULAR LOWER BODY CLOTHING: A LITTLE
TOILETING: A LITTLE
HELP NEEDED FOR BATHING: A LITTLE
WALKING IN HOSPITAL ROOM: A LITTLE
TOILETING: A LITTLE
WALKING IN HOSPITAL ROOM: A LITTLE

## 2024-03-16 ASSESSMENT — PAIN - FUNCTIONAL ASSESSMENT
PAIN_FUNCTIONAL_ASSESSMENT: 0-10

## 2024-03-16 ASSESSMENT — ACTIVITIES OF DAILY LIVING (ADL)
BATHING_LEVEL_OF_ASSISTANCE: MINIMUM ASSISTANCE
BATHING_WHERE_ASSESSED: SITTING SINKSIDE;STANDING SINKSIDE
BATHING_EQUIPMENT_NEEDED: LONG-HANDLED SPONGE
HOME_MANAGEMENT_TIME_ENTRY: 25

## 2024-03-16 ASSESSMENT — PAIN SCALES - GENERAL
PAINLEVEL_OUTOF10: 0 - NO PAIN

## 2024-03-16 NOTE — PROGRESS NOTES
"Jessy Mcdaniel is a 78 y.o. female on day 3 of admission presenting with Osteoarthritis of right knee.    Subjective   Doing well.  Resting in bed.  Denies chest pain shortness of breath dizziness or lightheadedness.  Knee pain is controlled       Objective     Physical Exam  Right lower extremity: Dressing has 1 spot inferiorly.  Not very large.  No significant swelling.  Compartments of the thigh and calf are soft otherwise.  Distally neurovascularly intact.    Last Recorded Vitals  Blood pressure (!) 122/44, pulse 69, temperature 36.5 °C (97.7 °F), temperature source Oral, resp. rate 17, height 1.651 m (5' 5\"), weight 80.8 kg (178 lb 2.1 oz), SpO2 98 %.      Relevant Results      Results for orders placed or performed during the hospital encounter of 03/13/24 (from the past 24 hour(s))   PST Top   Result Value Ref Range    Extra Tube Hold for add-ons.    CBC and Auto Differential   Result Value Ref Range    WBC 5.9 4.4 - 11.3 x10*3/uL    nRBC 0.0 0.0 - 0.0 /100 WBCs    RBC 2.48 (L) 4.00 - 5.20 x10*6/uL    Hemoglobin 7.6 (L) 12.0 - 16.0 g/dL    Hematocrit 24.3 (L) 36.0 - 46.0 %    MCV 98 80 - 100 fL    MCH 30.6 26.0 - 34.0 pg    MCHC 31.3 (L) 32.0 - 36.0 g/dL    RDW 14.0 11.5 - 14.5 %    Platelets 212 150 - 450 x10*3/uL    Neutrophils % 54.5 40.0 - 80.0 %    Immature Granulocytes %, Automated 1.0 (H) 0.0 - 0.9 %    Lymphocytes % 34.3 13.0 - 44.0 %    Monocytes % 9.5 2.0 - 10.0 %    Eosinophils % 0.5 0.0 - 6.0 %    Basophils % 0.2 0.0 - 2.0 %    Neutrophils Absolute 3.21 1.60 - 5.50 x10*3/uL    Immature Granulocytes Absolute, Automated 0.06 0.00 - 0.50 x10*3/uL    Lymphocytes Absolute 2.02 0.80 - 3.00 x10*3/uL    Monocytes Absolute 0.56 0.05 - 0.80 x10*3/uL    Eosinophils Absolute 0.03 0.00 - 0.40 x10*3/uL    Basophils Absolute 0.01 0.00 - 0.10 x10*3/uL       Assessment/Plan   Principal Problem:    Osteoarthritis of right knee  Active Problems:    Chronic pain of right knee    Primary osteoarthritis of right knee   "  Osteoarthritis of right knee, unspecified osteoarthritis type      Problem List:  Right total knee replacement: Mobilize physical therapy weightbearing as tolerated.  Appears to have plans for discharge to skilled facility tomorrow.  DVT risk: Per Dr. Skinner very  Anemia due to acute blood loss: Blood management consultation has been placed.  She is getting IV iron.  She does not appear to be symptomatic at this stage and I would hold off on any transfusion.  Patient agreeable.        Mac Barrera MD

## 2024-03-16 NOTE — PROGRESS NOTES
Jessy Mcdaniel is a 78 y.o. female on day 3 of admission presenting with Osteoarthritis of right knee.    [place] completed. 7000 completed.   DC plan is for patient to go to [facility] on 03/17/24 due to needing 3 midnights for Medicare.     Transport will need set up tomorrow.   Patient has a secure dc plan.     Nasrin Nam RN

## 2024-03-16 NOTE — PROGRESS NOTES
Occupational Therapy    OT Treatment    Patient Name: Jessy Mcdaniel  MRN: [id number]  Today's Date: 3/16/2024  Time Calculation  Start Time: 1325  Stop Time: 1405  Time Calculation (min): 40 min         Assessment:  OT Assessment: Pt progressing with established POC. Pt will benefit from continued skilled therapuetic intervention to address remaining deficits.  Prognosis: Good  Barriers to Discharge: None  Evaluation/Treatment Tolerance: Patient tolerated treatment well  Medical Staff Made Aware: Yes  End of Session Communication: Bedside nurse  End of Session Patient Position: Up in chair, Alarm off, not on at start of session (call light in reach all needs met NSG of with no alarm on Pt)  OT Assessment Results: Decreased ADL status, Decreased upper extremity strength, Decreased safe judgment during ADL, Decreased cognition, Decreased endurance, Decreased functional mobility, Decreased gross motor control, Decreased IADLs  Prognosis: Good  Barriers to Discharge: None  Evaluation/Treatment Tolerance: Patient tolerated treatment well  Medical Staff Made Aware: Yes  Strengths: Ability to acquire knowledge, Attitude of self, Coping skills  Barriers to Participation: Comorbidities  Plan:  Treatment Interventions: ADL retraining, Functional transfer training, Endurance training, Equipment evaluation/education, Patient/family training, Compensatory technique education  OT Frequency: 5 times per week  OT Discharge Recommendations: Low intensity level of continued care  Equipment Recommended upon Discharge: Wheeled walker  OT Recommended Transfer Status: Assist of 1  OT - OK to Discharge: Yes  Treatment Interventions: ADL retraining, Functional transfer training, Endurance training, Equipment evaluation/education, Patient/family training, Compensatory technique education    Subjective   Previous Visit Info:  OT Last Visit  OT Received On: 03/16/24  General:  General  Reason for Referral: impaired functional mobility. This 78  02-Nov-2018 year old female was admitted for a planned surgery and is now s/p R TKA by Dr. Guerra.  Referred By: Dr Guerra  Past Medical History Relevant to Rehab: A-fib, A-flutter, colon cancer, HTN, OA, osteoporosis, premature atrial contraction, sleep apnea (on CPAP), L knee menisectomy & Baker's cyst removal, bilat breast biopsy  Missed Visit: No  Family/Caregiver Present: No  Prior to Session Communication: Bedside nurse  Patient Position Received: Bed, 3 rail up, Alarm off, not on at start of session  Preferred Learning Style: verbal  General Comment: Confered with NSG. Pt agreeable to skilled therapeutic intervention  Precautions:  Hearing/Visual Limitations: wears bifocals; hearing WFL  LE Weight Bearing Status: Weight Bearing as Tolerated (RLE)  Medical Precautions: Fall precautions  Post-Surgical Precautions: Right total knee precautions  Precautions Comment: Pt able to independently recall knee precautions.  Pain:  Pain Assessment  Pain Assessment: 0-10  Pain Score: 0 - No pain    Objective    Cognition:  Cognition  Overall Cognitive Status: Within Functional Limits  Orientation Level: Oriented X4  Coordination:  Movements are Fluid and Coordinated: No  Upper Body Coordination: WFL  Lower Body Coordination: slower rate of movement RLE>LLE  Activities of Daily Living:      Grooming  Grooming Level of Assistance: Distant supervision  Grooming Where Assessed: Standing sinkside  Grooming Comments: Pt combing hair items in reach    UE Bathing  UE Bathing Level of Assistance: Setup  UE Bathing Where Assessed: Sitting sinkside  UE Bathing Comments: Pt sponge bathing    LE Bathing  LE Bathing Adaptive Equipment: Long-handled sponge (recommended)  LE Bathing Level of Assistance: Minimum assistance  LE Bathing Where Assessed: Sitting sinkside, Standing sinkside  LE Bathing Comments: spponge bathing instrucgted LH sponge B feet Pt unilateral hand suppoert grab bar in stance    UE Dressing  UE Dressing Level of Assistance:  Setup  UE Dressing Where Assessed: Other (Comment) (toilet)  UE Dressing Comments: P doff/don hospital gown    LE Dressing  LE Dressing: Yes  LE Dressing Adaptive Equipment: Reacher, Sock aide  Pants Level of Assistance: Contact guard (Pt thread pants use LH [id number] pull to waist in stance no LOB)  LE Dressing Where Assessed: Toilet  LE Dressing Comments: Pt demonstrate good use adaptive equiptment with lower body dressing    Toileting  Toileting Level of Assistance: Close supervision  Where Assessed: Toilet  Toileting Comments: Pt adjusting cothing prior/after/hygiene  Functional Standing Tolerance:  Time: 3 minutes  Activity: stance phase ADL skills  Functional Standing Tolerance Comments: unilateral hand support  Bed Mobility/Transfers: Bed Mobility  Bed Mobility: Yes  Bed Mobility 1  Bed Mobility 1: Supine to sitting, Sitting to supine  Level of Assistance 1: Contact guard  Bed Mobility Comments 1: HOB elevated, used rail  Bed Mobility 2  Bed Mobility  2: Scooting  Level of Assistance 2: Close supervision  Bed Mobility Comments 2: bed to neutral to edge of bed    Transfers  Transfer: Yes  Transfer 1  Transfer From 1: Bed to  Transfer to 1: Stand  Technique 1: Sit to stand  Transfer Device 1: Walker, Gait belt  Transfer Level of Assistance 1: Close supervision  Trials/Comments 1: vc hand placement  Transfers 2  Transfer From 2: Stand to  Transfer to 2: Chair with arms  Technique 2: Stand to sit  Transfer Device 2: Walker  Transfer Level of Assistance 2: Close supervision  Trials/Comments 2: vc hand placement    Toilet Transfers  Toilet Transfer From: Chair  Toilet Transfer Type: To and from  Toilet Transfer to: Standard toilet  Toilet Transfer Technique: Ambulating  Toilet Transfers: Contact guard  Toilet Transfers Comments: vc hand placement     Functional Mobility:  Functional Mobility  Functional Mobility Performed: Yes  Functional Mobility 1  Surface 1: Level tile  Device 1: Rolling walker  Functional  Mobility Support Devices: Gait belt  Assistance 1: Contact guard  Quality of Functional Mobility 1: Inconsistent stride length  Comments 1: 15' to include doorway, backing and turns    Outcome Measures:[facility] Daily Activity  Putting on and taking off regular lower body clothing: A little  Bathing (including washing, rinsing, drying): A little  Putting on and taking off regular upper body clothing: None  Toileting, which includes using toilet, bedpan or urinal: A little  Taking care of personal grooming such as brushing teeth: None  Eating Meals: None  Daily Activity - Total Score: 21    Education Documentation  Body Mechanics, taught by JORDYN Berry at 3/16/2024  3:26 PM.  Learner: Patient  Readiness: Acceptance  Method: Explanation, Demonstration, Handout, Teach-back  Response: Verbalizes Understanding, Demonstrated Understanding  Comment: Instructed Pt with application R knee precautions to ADL skills, and safe transfers    Precautions, taught by JORDYN Berry at 3/16/2024  3:26 PM.  Learner: Patient  Readiness: Acceptance  Method: Explanation, Demonstration, Handout, Teach-back  Response: Verbalizes Understanding, Demonstrated Understanding  Comment: Instructed Pt with application R knee precautions to ADL skills, and safe transfers    ADL Training, taught by JORDYN Berry at 3/16/2024  3:26 PM.  Learner: Patient  Readiness: Acceptance  Method: Explanation, Demonstration, Handout, Teach-back  Response: Verbalizes Understanding, Demonstrated Understanding  Comment: Instructed Pt with application R knee precautions to ADL skills, and safe transfers    Education Comments  No comments found.        OP EDUCATION:    Goals:  Encounter Problems       Encounter Problems (Active)       OT Goals       ADLs (Progressing)       Start:  03/13/24    Expected End:  03/30/24       Pt will complete ADL tasks with Mod I, using AE as needed, in order to complete self-care tasks.           Functional mobility  (Progressing)       Start:  03/13/24    Expected End:  03/30/24       Pt will perform functional mobility household distance at mod ind level with RW           Functional transfers (Progressing)       Start:  03/13/24    Expected End:  03/30/24       Pt will perform functional transfers at mod ind level with RW

## 2024-03-16 NOTE — PROGRESS NOTES
Physical Therapy    Physical Therapy Treatment    Patient Name: Jessy Mcdaniel  MRN: [id number]  Today's Date: 3/16/2024  Time Calculation  Start Time: 0845  Stop Time: 0930  Time Calculation (min): 45 min   Ther ex 20 mins  AMB 10 mins  Theract 10 mins    Assessment/Plan   PT Assessment  PT Assessment Results: Decreased strength, Decreased range of motion, Impaired balance, Decreased mobility, Decreased endurance, Decreased safety awareness, Pain  Strengths: Support and attitude of living partners, Premorbid level of function, Housing layout  Barriers to Participation: Comorbidities, Access to adaptive/assistive products  End of Session Communication: Bedside nurse  End of Session Patient Position: Bed, 3 rail up, Alarm off, not on at start of session (call light and needs within reach)     PT Plan  Treatment/Interventions: Bed mobility, Transfer training, Gait training, Stair training, Balance training, Strengthening, Range of motion, Therapeutic exercise, Therapeutic activity, Home exercise program  PT Plan: Skilled PT  PT Frequency: BID  PT Discharge Recommendations: Moderate intensity level of continued care  Equipment Recommended upon Discharge: Wheeled walker  PT Recommended Transfer Status: Assist x1  PT - OK to Discharge: Yes      General Visit Information:   PT  Visit  PT Received On: 03/16/24  Response to Previous Treatment: Patient with no complaints from previous session.  General  Reason for Referral: impaired functional mobility. This 78 year old female was admitted for a planned surgery and is now s/p R TKA by Dr. Guerra.  Referred By: Dr Guerra  Family/Caregiver Present: No  Patient Position Received: Bed, 3 rail up, Alarm off, not on at start of session  General Comment: Pt agreeable to treatment    Subjective   Precautions:  Precautions  LE Weight Bearing Status: Weight Bearing as Tolerated (RLE)  Medical Precautions: Fall precautions  Post-Surgical Precautions: Right total knee  precautions    Objective   Pain:  Pain Assessment  Pain Score: 0 - No pain    Activity Tolerance:  Activity Tolerance  Endurance: Tolerates 30+ min exercise without fatigue  Treatments:  Therapeutic Exercise  Therapeutic Exercise Performed: Yes  Therapeutic Exercise Activity 1: B APs x20  Therapeutic Exercise Activity 2: B quad sets x20  Therapeutic Exercise Activity 3: Glute sets x20  Therapeutic Exercise Activity 4: SLRs with assist 1x10  Therapeutic Exercise Activity 5: LAQs x15 L/R each  Therapeutic Exercise Activity 6: Isometric hip adduction x15  Therapeutic Exercise Activity 7: isometric hip abduction x15  Therapeutic Exercise Activity 8: Resisted hamstring curls x15 L/R each         Ambulation/Gait Training  Ambulation/Gait Training Performed: Yes  Ambulation/Gait Training 1  Surface 1: Level tile  Device 1: Rolling walker  Gait Support Devices: Gait belt  Assistance 1: Close supervision  Quality of Gait 1: Decreased step length  Comments/Distance (ft) 1: AMB 50'x2 with RW  Transfers  Transfer: Yes  Transfer 1  Transfer From 1: Bed to  Transfer to 1: Stand  Technique 1: Sit to stand  Transfer Device 1: Walker, Gait belt  Transfer Level of Assistance 1: Close supervision  Transfers 2  Transfer From 2: Stand to  Transfer to 2: Chair with arms  Technique 2: Stand to sit  Transfer Device 2: Walker  Transfer Level of Assistance 2: Close supervision  Trials/Comments 2: Up in bedside chair    Stairs  Stairs: Yes  Stairs  Rails 1: Right (B UEs on rail)  Device 1: Railing  Support Devices 1: Gait belt  Assistance 1: Minimum assistance  Comment/Number of Steps 1: 4 steps x1 with non recirprocal technique    Outcome Measures:  [facility] Basic Mobility  Turning from your back to your side while in a flat bed without using bedrails: A little  Moving from lying on your back to sitting on the side of a flat bed without using bedrails: A little  Moving to and from bed to chair (including a wheelchair): A little  Standing up  from a chair using your arms (e.g. wheelchair or bedside chair): A little  To walk in hospital room: A little  Climbing 3-5 steps with railing: A little  Basic Mobility - Total Score: 18    Education Documentation  No documentation found.  Education Comments  No comments found.        OP EDUCATION:       Encounter Problems       Encounter Problems (Active)       Mobility       STG - Patient will ambulate >/=100 ft with FWW at mod I level. (Progressing)       Start:  03/13/24    Expected End:  03/26/24            STG - Patient will ascend and descend 4 stairs with bilateral rails and close S. (Progressing)       Start:  03/13/24    Expected End:  03/26/24               PT Transfers       STG - Patient to transfer supine<>sit at mod I level. (Progressing)       Start:  03/13/24    Expected End:  03/26/24            STG - Patient will transfer sit<>stand with FWW at mod I level. (Progressing)       Start:  03/13/24    Expected End:  03/26/24

## 2024-03-16 NOTE — PROGRESS NOTES
Jessy Calzada is a 78 y.o. female on day 3 of admission presenting with Osteoarthritis of right knee.      Subjective   POD #3 right total knee arthroplasty.  Sitting up in chair with feet elevated during exam.  Denies shortness of breath.  Denies chest pain, denies dizziness.  No acute distress       Objective     Last Recorded Vitals  BP (!) 124/44 (BP Location: Left arm, Patient Position: Lying)   Pulse 70   Temp 36.5 °C (97.7 °F) (Oral)   Resp 16   Wt 80.8 kg (178 lb 2.1 oz)   SpO2 95%   Intake/Output last 3 Shifts:    Intake/Output Summary (Last 24 hours) at 3/16/2024 1124  Last data filed at 3/16/2024 0832  Gross per 24 hour   Intake 610 ml   Output --   Net 610 ml         Admission Weight  Weight: 80.8 kg (178 lb 2.1 oz) (03/13/24 1622)    Daily Weight  03/13/24 : 80.8 kg (178 lb 2.1 oz)    Image Results  Lower extremity venous [place] [address]             Phone [phone]       Vascular Lab Report     VASC US LOWER EXTREMITY VENOUS DUPLEX RIGHT    Patient Name:      JESSY CALZADA       Reading Physician:  [id number] Mayuri Sotelo MD, RPVI  Study Date:        3/15/2024            Ordering Provider:  [id number] CHARI ZARAGOZA  MRN/PID:           [id number]             Fellow:  Accession#:        [id number]         Technologist:       Jazlyn Calderón RVT  Date of Birth/Age: 1945 / 78 years Technologist 2:  Gender:            F                    Encounter#:         [id number]  Admission Status:  Inpatient            Location Performed: [facility]       Diagnosis/ICD: Localized (leg) edema-R60.0  Indication:    Limb swelling  CPT Codes:     21125 Peripheral venous duplex scan for DVT Limited       Pertinent History: S/p right total knee arthroplasty 3/13/2024.       CONCLUSIONS:     Right Lower Venous:  No evidence of acute deep vein thrombus visualized in the right lower extremity. Additional Findings; Heterogeneous sturcture noted in the popliteal fossa measuring approximately 2.9 x 1.5 x 0.6 cm. Findings could be consistent with complex/ruptured Baker's cyst or hematoma. No vascularity noted. Clinical correlation and imaging follow-up is recommended.    Left Lower Venous: Left common femoral vein is negative for deep vein thrombus.     Imaging & Doppler Findings:     Right                 Compressible Thrombus        Flow  Distal External Iliac                None       Pulsatile  CFV                       Yes        None       Pulsatile  PFV                       Yes        None  FV Proximal               Yes        None       Pulsatile  FV Mid                    Yes        None  FV Distal                 Yes        None  Popliteal                 Yes        None   Spontaneous/Phasic  Peroneal                  Yes        None  PTV                       Yes        None       Left Compress Thrombus        Flow  CFV    Yes      None   Spontaneous/Phasic       [id number] Mayuri Sotelo MD, RPVI  Electronically signed by [id number] Mayuri Sotelo MD, RPJENNIFER on 3/15/2024 at 6:43:19 PM       ** Final **      Physical Exam  Vitals and nursing note reviewed.   Constitutional:       Appearance: Normal appearance. She is normal weight.   HENT:      Head: Normocephalic and atraumatic.      Nose: Nose normal.      Mouth/Throat:      Mouth: Mucous membranes are moist.   Eyes:      Extraocular Movements: Extraocular movements intact.      Pupils: Pupils are equal, round, and reactive to light.   Cardiovascular:      Rate and Rhythm: Normal rate and regular rhythm.      Pulses: Normal pulses.      Heart sounds: Normal heart sounds.   Pulmonary:      Effort: Pulmonary effort is normal.      Breath sounds: Normal breath sounds.   Abdominal:      General: Bowel sounds are normal.      Palpations: Abdomen is soft.   Musculoskeletal:          General: Normal range of motion.      Cervical back: Normal range of motion and neck supple.      Comments: Range of motion right lower extremity limited due to pain, improving   Skin:     General: Skin is warm and dry.      Capillary Refill: Capillary refill takes less than 2 seconds.      Comments: Right knee dressing clean dry intact   Neurological:      General: No focal deficit present.      Mental Status: She is alert and oriented to person, place, and time.   Psychiatric:         Mood and Affect: Mood normal.         Behavior: Behavior normal.         Relevant Results               Assessment/Plan      Principal Problem:    Osteoarthritis of right knee  Active Problems:    Chronic pain of right knee    Primary osteoarthritis of right knee    Osteoarthritis of right knee, unspecified osteoarthritis type    POD #2 Right Total knee arthroplasty  As managed per primary service  PT OT  Incentive spirometer teaching  DVT prophylaxis  As needed pain management regimen  On room air    Right lower extremity peripheral edema  With some mild edema right lower extremity, ultrasound right lower extremity negative for DVT.      History of Atrial fibrillation, flutter  Normal sinus rhythm currently   continuous telemetry  Continue home flecainide  Low-dose Eliquis     Hypothyroidism  Continue home Synthroid     History of hypertension  Hold home Maxizde for now, hold home Lasix for now  Monitor vital signs    Orthostatic hypotension  Resolved    Acute blood loss anemia  Asymptomatic  Likely related to surgery  Give dose of IV Venofer  Monitor CBC    Obstructive sleep apnea  CPAP q. nightly, patient has brought in her own device     Osteoarthritis  As needed Tylenol, PT OT, fall precautions     DVT/GI  Low-dose Eliquis, PPI             Addendum 3/14/2024 1443: Has received 1 L fluid bolus, IV Venofer and epogen, vitb12.  Orthostatic hypotension has resolved.  Vital signs laying was 108/45 heart rate 67, sitting 124/53 heart  rate 74, and standing 132/43 heart rate 71.   3/15/2024: Awaiting SNF placement, bed will be available Sunday.  3/16/2024: Will give dose of IV Venofer, Awaiting SNF placement  LIAM Carbajal-CNP

## 2024-03-17 VITALS
OXYGEN SATURATION: 94 % | RESPIRATION RATE: 15 BRPM | SYSTOLIC BLOOD PRESSURE: 120 MMHG | HEIGHT: 65 IN | HEART RATE: 72 BPM | BODY MASS INDEX: 29.68 KG/M2 | DIASTOLIC BLOOD PRESSURE: 46 MMHG | WEIGHT: 178.13 LBS | TEMPERATURE: 98.2 F

## 2024-03-17 LAB
BASOPHILS # BLD AUTO: 0.02 X10*3/UL (ref 0–0.1)
BASOPHILS NFR BLD AUTO: 0.3 %
EOSINOPHIL # BLD AUTO: 0.03 X10*3/UL (ref 0–0.4)
EOSINOPHIL NFR BLD AUTO: 0.5 %
ERYTHROCYTE [DISTWIDTH] IN BLOOD BY AUTOMATED COUNT: 14.1 % (ref 11.5–14.5)
HCT VFR BLD AUTO: 24.9 % (ref 36–46)
HGB BLD-MCNC: 7.9 G/DL (ref 12–16)
HOLD SPECIMEN: NORMAL
IMM GRANULOCYTES # BLD AUTO: 0.06 X10*3/UL (ref 0–0.5)
IMM GRANULOCYTES NFR BLD AUTO: 1 % (ref 0–0.9)
LYMPHOCYTES # BLD AUTO: 1.46 X10*3/UL (ref 0.8–3)
LYMPHOCYTES NFR BLD AUTO: 24 %
MCH RBC QN AUTO: 31.1 PG (ref 26–34)
MCHC RBC AUTO-ENTMCNC: 31.7 G/DL (ref 32–36)
MCV RBC AUTO: 98 FL (ref 80–100)
MONOCYTES # BLD AUTO: 0.45 X10*3/UL (ref 0.05–0.8)
MONOCYTES NFR BLD AUTO: 7.4 %
NEUTROPHILS # BLD AUTO: 4.06 X10*3/UL (ref 1.6–5.5)
NEUTROPHILS NFR BLD AUTO: 66.8 %
NRBC BLD-RTO: 0 /100 WBCS (ref 0–0)
PLATELET # BLD AUTO: 236 X10*3/UL (ref 150–450)
RBC # BLD AUTO: 2.54 X10*6/UL (ref 4–5.2)
WBC # BLD AUTO: 6.1 X10*3/UL (ref 4.4–11.3)

## 2024-03-17 PROCEDURE — 2500000001 HC RX 250 WO HCPCS SELF ADMINISTERED DRUGS (ALT 637 FOR MEDICARE OP)

## 2024-03-17 PROCEDURE — 85025 COMPLETE CBC W/AUTO DIFF WBC: CPT

## 2024-03-17 PROCEDURE — 36415 COLL VENOUS BLD VENIPUNCTURE: CPT

## 2024-03-17 PROCEDURE — 2500000001 HC RX 250 WO HCPCS SELF ADMINISTERED DRUGS (ALT 637 FOR MEDICARE OP): Performed by: ORTHOPAEDIC SURGERY

## 2024-03-17 PROCEDURE — 2500000004 HC RX 250 GENERAL PHARMACY W/ HCPCS (ALT 636 FOR OP/ED): Performed by: ORTHOPAEDIC SURGERY

## 2024-03-17 RX ADMIN — ACETAMINOPHEN 650 MG: 325 TABLET ORAL at 06:04

## 2024-03-17 RX ADMIN — ACETAMINOPHEN 650 MG: 325 TABLET ORAL at 11:55

## 2024-03-17 RX ADMIN — CLOTRIMAZOLE AND BETAMETHASONE DIPROPIONATE 1 APPLICATION: 10; .5 CREAM TOPICAL at 09:08

## 2024-03-17 RX ADMIN — LEVOTHYROXINE SODIUM 50 MCG: 0.05 TABLET ORAL at 06:04

## 2024-03-17 RX ADMIN — POLYETHYLENE GLYCOL 3350 17 G: 17 POWDER, FOR SOLUTION ORAL at 09:07

## 2024-03-17 RX ADMIN — APIXABAN 2.5 MG: 2.5 TABLET, FILM COATED ORAL at 09:07

## 2024-03-17 RX ADMIN — PANTOPRAZOLE SODIUM 40 MG: 40 TABLET, DELAYED RELEASE ORAL at 06:04

## 2024-03-17 RX ADMIN — FLECAINIDE ACETATE 50 MG: 50 TABLET ORAL at 09:07

## 2024-03-17 ASSESSMENT — COGNITIVE AND FUNCTIONAL STATUS - GENERAL
HELP NEEDED FOR BATHING: A LITTLE
TURNING FROM BACK TO SIDE WHILE IN FLAT BAD: A LITTLE
MOVING FROM LYING ON BACK TO SITTING ON SIDE OF FLAT BED WITH BEDRAILS: A LITTLE
MOVING TO AND FROM BED TO CHAIR: A LITTLE
WALKING IN HOSPITAL ROOM: A LITTLE
CLIMB 3 TO 5 STEPS WITH RAILING: A LITTLE
MOBILITY SCORE: 18
STANDING UP FROM CHAIR USING ARMS: A LITTLE
DAILY ACTIVITIY SCORE: 22
DRESSING REGULAR LOWER BODY CLOTHING: A LITTLE

## 2024-03-17 ASSESSMENT — PAIN SCALES - GENERAL: PAINLEVEL_OUTOF10: 0 - NO PAIN

## 2024-03-17 ASSESSMENT — PAIN - FUNCTIONAL ASSESSMENT: PAIN_FUNCTIONAL_ASSESSMENT: 0-10

## 2024-03-17 NOTE — PROGRESS NOTES
Pt has a written dc order. Pt met 3 midnights for Medicare. Pt being discharged to [facility]. Transport set up for 1400 via [facility] thru round trip. Sent AVS summary and DC summary via [employer]. 7000 completed and [place]rod signed.    PATIENT HAS A SECURE DISCHARGE PLAN TO GO TO [facility] AT 1400 VIA [facility].       03/17/24 1144   Current Planned Discharge Disposition   Current Planned Discharge Disposition SNF

## 2024-03-17 NOTE — CARE PLAN
The patient's goals for the shift include      The clinical goals for the shift include pain will be controled overnight.

## 2024-03-17 NOTE — PROGRESS NOTES
Jessy Calzada is a 78 y.o. female on day 4 of admission presenting with Osteoarthritis of right knee.      Subjective   POD #4 right total knee arthroplasty.  States she feels great.  Denies shortness of breath.  Denies chest pain, denies dizziness.  No acute distress.       Objective     Last Recorded Vitals  BP (!) 120/46 (BP Location: Left arm, Patient Position: Lying)   Pulse 72   Temp 36.8 °C (98.2 °F) (Oral)   Resp 15   Wt 80.8 kg (178 lb 2.1 oz)   SpO2 94%   Intake/Output last 3 Shifts:    Intake/Output Summary (Last 24 hours) at 3/17/2024 1127  Last data filed at 3/17/2024 1047  Gross per 24 hour   Intake 1660 ml   Output --   Net 1660 ml         Admission Weight  Weight: 80.8 kg (178 lb 2.1 oz) (03/13/24 1622)    Daily Weight  03/13/24 : 80.8 kg (178 lb 2.1 oz)    Image Results  Lower extremity venous duplex right             [place] [address]             Phone [phone]       Vascular Lab Report     [facility] US LOWER EXTREMITY VENOUS DUPLEX RIGHT    Patient Name:      JESSY CALZADA       Reading Physician:  [id number] Mayuri Sotelo MD, RPVI  Study Date:        3/15/2024            Ordering Provider:  [id number] CHARI ZARAGOZA  MRN/PID:           [id number]             Fellow:  Accession#:        [id number]         Technologist:       Jazlyn Calderón RVRUPESH  Date of Birth/Age: 1945 / 78 years Technologist 2:  Gender:            F                    Encounter#:         [id number]  Admission Status:  Inpatient            Location Performed: [facility]       Diagnosis/ICD: Localized (leg) edema-R60.0  Indication:    Limb swelling  CPT Codes:     63477 Peripheral venous duplex scan for DVT Limited       Pertinent History: S/p right total knee arthroplasty 3/13/2024.       CONCLUSIONS:     Right Lower Venous: No evidence of acute deep  vein thrombus visualized in the right lower extremity. Additional Findings; Heterogeneous sturcture noted in the popliteal fossa measuring approximately 2.9 x 1.5 x 0.6 cm. Findings could be consistent with complex/ruptured Baker's cyst or hematoma. No vascularity noted. Clinical correlation and imaging follow-up is recommended.    Left Lower Venous: Left common femoral vein is negative for deep vein thrombus.     Imaging & Doppler Findings:     Right                 Compressible Thrombus        Flow  Distal External Iliac                None       Pulsatile  CFV                       Yes        None       Pulsatile  PFV                       Yes        None  FV Proximal               Yes        None       Pulsatile  FV Mid                    Yes        None  FV Distal                 Yes        None  Popliteal                 Yes        None   Spontaneous/Phasic  Peroneal                  Yes        None  PTV                       Yes        None       Left Compress Thrombus        Flow  CFV    Yes      None   Spontaneous/Phasic       [id number] Mayuri Sotelo MD, RPJENNIFER  Electronically signed by [id number] MICHELLE Gonsales MD on 3/15/2024 at 6:43:19 PM       ** Final **      Physical Exam  Vitals and nursing note reviewed.   Constitutional:       Appearance: Normal appearance. She is normal weight.   HENT:      Head: Normocephalic and atraumatic.      Nose: Nose normal.      Mouth/Throat:      Mouth: Mucous membranes are moist.   Eyes:      Extraocular Movements: Extraocular movements intact.      Pupils: Pupils are equal, round, and reactive to light.   Cardiovascular:      Rate and Rhythm: Normal rate and regular rhythm.      Pulses: Normal pulses.      Heart sounds: Normal heart sounds.   Pulmonary:      Effort: Pulmonary effort is normal.      Breath sounds: Normal breath sounds.   Abdominal:      General: Bowel sounds are normal.      Palpations: Abdomen is soft.   Musculoskeletal:         General: Normal range of  motion.      Cervical back: Normal range of motion and neck supple.      Comments: Range of motion right lower extremity limited due to pain, improving   Skin:     General: Skin is warm and dry.      Capillary Refill: Capillary refill takes less than 2 seconds.      Comments: Right knee dressing clean dry intact   Neurological:      General: No focal deficit present.      Mental Status: She is alert and oriented to person, place, and time.   Psychiatric:         Mood and Affect: Mood normal.         Behavior: Behavior normal.         Relevant Results               Assessment/Plan      Principal Problem:    Osteoarthritis of right knee  Active Problems:    Chronic pain of right knee    Primary osteoarthritis of right knee    Osteoarthritis of right knee, unspecified osteoarthritis type    POD #4 Right Total knee arthroplasty  As managed per primary service  PT OT  Incentive spirometer teaching  DVT prophylaxis  As needed pain management regimen  On room air    Right lower extremity peripheral edema  With some mild edema right lower extremity, ultrasound right lower extremity negative for DVT.      History of Atrial fibrillation, flutter  Normal sinus rhythm currently   continuous telemetry  Continue home flecainide  Low-dose Eliquis     Hypothyroidism  Continue home Synthroid     History of hypertension  Hold home Maxizde for now, hold home Lasix for now  Monitor vital signs    Orthostatic hypotension  Resolved    Acute blood loss anemia  Asymptomatic  Improving  Likely related to surgery  Give dose of IV Venofer  Monitor CBC    Obstructive sleep apnea  CPAP q. nightly, patient has brought in her own device     Osteoarthritis  As needed Tylenol, PT OT, fall precautions     DVT/GI  Low-dose Eliquis, PPI             Addendum 3/14/2024 1443: Has received 1 L fluid bolus, IV Venofer and epogen, vitb12.  Orthostatic hypotension has resolved.  Vital signs laying was 108/45 heart rate 67, sitting 124/53 heart rate 74, and  standing 132/43 heart rate 71.   3/15/2024: Awaiting SNF placement, bed will be available Sunday.  3/16/2024: Will give dose of IV Venofer, Awaiting SNF placement  3/17/2024: Anemia improving.  Medically clear for discharge.  Will sign off.  Thank you for this consult.  Meliza Zuniga, LIAM-CNP

## 2024-03-28 ENCOUNTER — EVALUATION (OUTPATIENT)
Dept: PHYSICAL THERAPY | Facility: CLINIC | Age: 79
End: 2024-03-28
Payer: MEDICARE

## 2024-03-28 DIAGNOSIS — Z96.651 HISTORY OF TOTAL KNEE ARTHROPLASTY, RIGHT: ICD-10-CM

## 2024-03-28 PROCEDURE — 97162 PT EVAL MOD COMPLEX 30 MIN: CPT | Mod: GP | Performed by: PHYSICAL THERAPIST

## 2024-03-28 PROCEDURE — 97530 THERAPEUTIC ACTIVITIES: CPT | Mod: GP | Performed by: PHYSICAL THERAPIST

## 2024-03-28 PROCEDURE — 97110 THERAPEUTIC EXERCISES: CPT | Mod: GP | Performed by: PHYSICAL THERAPIST

## 2024-03-28 ASSESSMENT — ENCOUNTER SYMPTOMS
OCCASIONAL FEELINGS OF UNSTEADINESS: 0
LOSS OF SENSATION IN FEET: 0
DEPRESSION: 0

## 2024-03-28 NOTE — PROGRESS NOTES
"Physical Therapy Evaluation and Treatment      Patient Name: Jessy Mcdaniel  MRN: [id number]  Today's Date: 3/28/2024  Time Calculation  Start Time: 1431  Stop Time: 1512  Time Calculation (min): 41 min  PT Therapeutic Procedures Time Entry  Therapeutic Exercise Time Entry: 16  Therapeutic Activity Time Entry: 11      Insurance:  Visit number: 1 of 6  Authorization info: [place] Vis[place]  Insurance Type: Payor: MEDICARE / Plan: MEDICARE PART A AND B / Product Type: *No Product type* /     Current Problem:   1. History of total knee arthroplasty, right  Referral to Physical Therapy    Follow Up In Physical Therapy          Subjective    General:  Pt presents s/p right TKR 3/13/2024. After surgery she was at [facility] for a [facility]. Prior to surgery she was having a lot of pain. Used to walk 3 miles a day and noticed this got increasingly difficult. Not having any pain currently, and even sleeping good at night. More \"pulling\" symptoms. Currently using 2WW but feels she doesn't necessarily need it. No home PT. Difficulty with standing and walking for prolonged periods. Not driving yet.      Precautions: Post-op  Pain: 0/10    Objective   ROM   Right knee AROM:  Flex 92 deg  Ext 9 deg  *Discomfort at endrange    MMT   Right LE strength:  Hip flex 4-/5  Knee flex 4-/5  Knee ext 3/5      Palpation   TTP right knee incision.    Moderate edema right knee. Incision clean/dry/intact and healing with steri-strips intact     Flexibility  Moderate tightness in right quad and hamstrings     Transfers   Bilateral UE support for sit to stand    Gait   Ambulates with decreased shila using 2WW and forward flexed posture. Decreased right knee flexion. Decreased stance time and step length on right. Mild unsteadiness with narrow JACK       Stairs   Ascends and descends steps with step-to-pattern using 1 rail    Outcome Measures:  LEFS: 54/80      Treatments:  Therapeutic Exercise:  Stand Heel raises  Stand Hip MIP  Mini squats   (Also " reviewed LAQ, hamstring curls, and SLR)    Therapeutic activity:  Educated pt on eval findings and POC  Educated pt on anatomy of knee  Discussed timeframe for healing       Assessment   Assessment:   Pt is a 78 y.o. female s/p right TKR. Pt with decreased ROM, reduced strength, gait deficits, impaired balance, edema, and flexibility limitations. Pt will benefit from skilled PT to address the above deficits for improvement in functional activities.       Moderate complexity due to patient's clinical presentation being evolving with changing characteristics, with comorbidities/complexities to include HTN, cancer, OA, all of which may negatively impact rehab tolerance and progression.     Plan:   Treatment/Interventions: Education/ Instruction, Gait training, Manual therapy, Neuromuscular re-education, Self care/ home management, Therapeutic activities, Therapeutic exercises, Taping techniques  PT Plan: Skilled PT  PT Frequency: 1 time per week  Duration: 5 more visits  Rehab Potential: Good  Plan of Care Agreement: Patient      Goals:   Active       Mobility       Goal 1       Start:  03/28/24    Expected End:  06/26/24       Pt will improve right knee AROM to WNL to improve I/ADLs         Goal 2       Start:  03/28/24    Expected End:  06/26/24       Pt will improve right LE strength to 5/5 to improve I/ADLs.            Pain       Goal 1       Start:  03/28/24    Expected End:  06/26/24       Pt will perform all housework with 0/10 pain and no difficulty.         Goal 2       Start:  03/28/24    Expected End:  06/26/24       Pt will stand/walk >30 min with 0/10 pain to improve I/ADLs.

## 2024-04-02 PROBLEM — I10 PRIMARY HYPERTENSION: Status: ACTIVE | Noted: 2023-04-25

## 2024-04-02 PROBLEM — N39.0 URINARY TRACT INFECTION: Status: ACTIVE | Noted: 2022-12-07

## 2024-04-02 PROBLEM — M79.672 LEFT FOOT PAIN: Status: ACTIVE | Noted: 2023-08-21

## 2024-04-02 PROBLEM — M25.369 INSTABILITY OF KNEE JOINT: Status: ACTIVE | Noted: 2024-01-19

## 2024-04-02 PROBLEM — G47.69 SLEEP-RELATED MOVEMENT DISORDER: Status: ACTIVE | Noted: 2024-04-02

## 2024-04-02 PROBLEM — R21 RASH: Status: ACTIVE | Noted: 2024-04-02

## 2024-04-02 PROBLEM — B37.2 INTERTRIGINOUS CANDIDIASIS: Status: ACTIVE | Noted: 2024-04-02

## 2024-04-02 PROBLEM — R30.0 DYSURIA: Status: ACTIVE | Noted: 2024-04-02

## 2024-04-02 PROBLEM — H25.10 NUCLEAR SCLEROTIC CATARACT: Status: ACTIVE | Noted: 2018-08-07

## 2024-04-05 ENCOUNTER — OFFICE VISIT (OUTPATIENT)
Dept: ORTHOPEDIC SURGERY | Facility: CLINIC | Age: 79
End: 2024-04-05
Payer: MEDICARE

## 2024-04-05 ENCOUNTER — HOSPITAL ENCOUNTER (OUTPATIENT)
Dept: RADIOLOGY | Facility: CLINIC | Age: 79
Discharge: HOME | End: 2024-04-05
Payer: MEDICARE

## 2024-04-05 VITALS — WEIGHT: 178.13 LBS | BODY MASS INDEX: 29.64 KG/M2

## 2024-04-05 DIAGNOSIS — M25.561 CHRONIC PAIN OF RIGHT KNEE: Primary | ICD-10-CM

## 2024-04-05 DIAGNOSIS — Z96.659 TOTAL KNEE REPLACEMENT STATUS: ICD-10-CM

## 2024-04-05 DIAGNOSIS — G89.29 CHRONIC PAIN OF RIGHT KNEE: Primary | ICD-10-CM

## 2024-04-05 DIAGNOSIS — Z96.651 S/P TOTAL KNEE REPLACEMENT, RIGHT: ICD-10-CM

## 2024-04-05 PROCEDURE — 1036F TOBACCO NON-USER: CPT | Performed by: PHYSICIAN ASSISTANT

## 2024-04-05 PROCEDURE — 1125F AMNT PAIN NOTED PAIN PRSNT: CPT | Performed by: PHYSICIAN ASSISTANT

## 2024-04-05 PROCEDURE — 1160F RVW MEDS BY RX/DR IN RCRD: CPT | Performed by: PHYSICIAN ASSISTANT

## 2024-04-05 PROCEDURE — 73560 X-RAY EXAM OF KNEE 1 OR 2: CPT | Mod: RIGHT SIDE | Performed by: ORTHOPAEDIC SURGERY

## 2024-04-05 PROCEDURE — 1111F DSCHRG MED/CURRENT MED MERGE: CPT | Performed by: PHYSICIAN ASSISTANT

## 2024-04-05 PROCEDURE — 73560 X-RAY EXAM OF KNEE 1 OR 2: CPT | Mod: RT

## 2024-04-05 PROCEDURE — 99024 POSTOP FOLLOW-UP VISIT: CPT | Performed by: PHYSICIAN ASSISTANT

## 2024-04-05 PROCEDURE — 1159F MED LIST DOCD IN RCRD: CPT | Performed by: PHYSICIAN ASSISTANT

## 2024-04-05 ASSESSMENT — PAIN - FUNCTIONAL ASSESSMENT: PAIN_FUNCTIONAL_ASSESSMENT: 0-10

## 2024-04-05 ASSESSMENT — LIFESTYLE VARIABLES: TOTAL SCORE: 0

## 2024-04-05 ASSESSMENT — PAIN SCALES - GENERAL: PAINLEVEL_OUTOF10: 1

## 2024-04-05 ASSESSMENT — PATIENT HEALTH QUESTIONNAIRE - PHQ9
2. FEELING DOWN, DEPRESSED OR HOPELESS: NOT AT ALL
SUM OF ALL RESPONSES TO PHQ9 QUESTIONS 1 AND 2: 0
1. LITTLE INTEREST OR PLEASURE IN DOING THINGS: NOT AT ALL

## 2024-04-05 ASSESSMENT — ENCOUNTER SYMPTOMS
OCCASIONAL FEELINGS OF UNSTEADINESS: 0
DEPRESSION: 0
LOSS OF SENSATION IN FEET: 0

## 2024-04-05 ASSESSMENT — PAIN DESCRIPTION - DESCRIPTORS: DESCRIPTORS: ACHING

## 2024-04-05 NOTE — PROGRESS NOTES
Subjective      Past Surgical History:   Procedure Laterality Date    APPENDECTOMY      BI STEREOTACTIC GUIDED BREAST RIGHT LOCALIZATION AND BIOPSY Right 09/24/2014    BI STEREOTACTIC GUIDED BREAST LOCALIZATION AND BIOPSY RIGHT [facility]    BI STEREOTACTIC GUIDED BREAST RIGHT LOCALIZATION AND BIOPSY Right 11/27/2017    BI STEREOTACTIC GUIDED BREAST LOCALIZATION AND BIOPSY RIGHT [facility]    BI US GUIDED BREAST LOCALIZATION AND BIOPSY LEFT Left 12/09/2022    BI US GUIDED BREAST LOCALIZATION AND BIOPSY LEFT [facility]    CATARACT EXTRACTION W/ INTRAOCULAR LENS  IMPLANT, BILATERAL      COLON SURGERY  1992    COLONOSCOPY      HYSTERECTOMY      KNEE ARTHROSCOPY W/ MENISCECTOMY Left     And Baker's cyst removal    KNEE SURGERY Right 03/13/2024    total    WISDOM TOOTH EXTRACTION            Patient History      This patient is s/p right total knee replacement done by Dr. Guerra on 3-. She has returned home from [facility]. They present today and state that their right knee pain is 1/10. They are slowly resuming their normal activities of daily living. She is working with outpatient physical therapy. She is seen today using a walker for support. They deny chest pain, shortness of breath.     There were no vitals taken for this visit.     ROS  CARDIOLOGY:   Negative for chest pain, shortness of breath.   RESPIRATORY:   Negative for chest pain, shortness of breath.   MUSCULOSKELETAL:   See HPI for details.   NEUROLOGY:   Negative for tingling, numbness, weakness.      Physical exam: Right knee: incision is clean dry and well healing. Patient has painless ROM from 5-100 degrees. Nontender in the right calf. Neurovascular is intact. Patient is seen today walking with a stable gait.     AP and lateral x-rays of the right knee done and read in office today show a right total knee replacement in good position.    Lower extremity venous duplex right    Result Date: 3/15/2024           [place] [id number]            Phone [phone]  Vascular Lab Report  [facility] US LOWER EXTREMITY VENOUS DUPLEX RIGHT Patient Name:      GIANCARLO FIDELINA CALZADA       Reading Physician:  [id number] Mayuri Sotelo MD, RPVI Study Date:        3/15/2024            Ordering Provider:  [id number] CHARI ZARAGOZA MRN/PID:           [id number]             Fellow: Accession#:        [id number]         Technologist:       Jazlyn Calderón RVT Date of Birth/Age: 1945 / 78 years Technologist 2: Gender:            F                    Encounter#:         [id number] Admission Status:  Inpatient            Location Performed: [facility]  Diagnosis/ICD: Localized (leg) edema-R60.0 Indication:    Limb swelling CPT Codes:     97827 Peripheral venous duplex scan for DVT Limited  Pertinent History: S/p right total knee arthroplasty 3/13/2024.  CONCLUSIONS:  Right Lower Venous: No evidence of acute deep vein thrombus visualized in the right lower extremity. Additional Findings; Heterogeneous sturcture noted in the popliteal fossa measuring approximately 2.9 x 1.5 x 0.6 cm. Findings could be consistent with complex/ruptured Baker's cyst or hematoma. No vascularity noted. Clinical correlation and imaging follow-up is recommended. Left Lower Venous: Left common femoral vein is negative for deep vein thrombus.  Imaging & Doppler Findings:  Right                 Compressible Thrombus        Flow Distal External Iliac                None       Pulsatile CFV                       Yes        None       Pulsatile PFV                       Yes        None FV Proximal               Yes        None       Pulsatile FV Mid                    Yes        None FV Distal                 Yes        None Popliteal                 Yes        None   Spontaneous/Phasic Peroneal                  Yes         None PTV                       Yes        None  Left Compress Thrombus        Flow CFV    Yes      None   Spontaneous/Phasic  [id number] MICHELLE Gonsales MD Electronically signed by [id number] MICHELLE Gonsales MD on 3/15/2024 at 6:43:19 PM  ** Final **     XR knee right 1-2 views    Result Date: 3/13/2024  Interpreted By:  Kevan Lucas, STUDY: XR KNEE RIGHT 1-2 VIEWS; 3/13/2024 11:53 am   INDICATION: Signs/Symptoms:Post op knee.   COMPARISON: 01/19/2024   ACCESSION NUMBER(S): [id number]   ORDERING CLINICIAN: MAGEN DANIEL   TECHNIQUE: AP and lateral views of the right knee.   FINDINGS: Normal appearance of femoral, tibial, and retropatellar components. There are expected postoperative changes of right total knee prosthesis. Expected postoperative changes of the soft tissues. No abnormal radiopaque foreign body.       Expected postoperative changes of right total knee prosthesis.   Signed by: Kevan Lucas 3/13/2024 12:14 PM Dictation workstation:   [id number]      Jessy was seen today for post-op.  Diagnoses and all orders for this visit:  Chronic pain of right knee (Primary)  S/P total knee replacement, right  Options are discussed with the patient in detail. The patient is instructed to continue with physical therapy to evaluate and treat with gentle strengthening and ROM exercises with modalities as needed. The patient is instructed regarding total knee infection precautions, activity modification and risk for further injury with falling or trauma and to use a walker for support, ice, provider directed at home gentle strengthening and ROM exercises, and the appropriate use of Tylenol as needed for pain with its potential adverse reactions and side effects. The patient understands. Return in 4 weeks or sooner as needed Please note that this report has been produced using speech recognition software.  It may contain errors related to grammar, punctuation or spelling.  Electronically signed, but not  reviewed.

## 2024-04-05 NOTE — PATIENT INSTRUCTIONS
Continue to rest, ice and elevate your knee.  Continue your pain regimen.   Continue Physical therapy.   Remember to call our office for antibiotics before dental work.   Use a cane or walker for support.   Do not kneel, twist, or squat and avoid heavy lifting.     Return in 4 weeks or sooner as needed.

## 2024-04-15 ENCOUNTER — TREATMENT (OUTPATIENT)
Dept: PHYSICAL THERAPY | Facility: CLINIC | Age: 79
End: 2024-04-15
Payer: MEDICARE

## 2024-04-15 DIAGNOSIS — Z96.651 HISTORY OF TOTAL KNEE ARTHROPLASTY, RIGHT: ICD-10-CM

## 2024-04-15 PROBLEM — R60.0 EDEMA OF LOWER EXTREMITY: Status: ACTIVE | Noted: 2024-04-15

## 2024-04-15 PROCEDURE — 97110 THERAPEUTIC EXERCISES: CPT | Mod: GP | Performed by: PHYSICAL THERAPIST

## 2024-04-15 NOTE — PROGRESS NOTES
Physical Therapy Treatment    Patient Name: Jessy Mcdaniel  MRN: [id number]  Today's Date: 4/15/2024  Time Calculation  Start Time: 0815  Stop Time: 0857  Time Calculation (min): 42 min  PT Therapeutic Procedures Time Entry  Therapeutic Exercise Time Entry: 42    Insurance:  Visit number: 2 of 6  Authorization info: [place] Vis[place]   Insurance Type: Payor: MEDICARE / Plan: MEDICARE PART A AND B / Product Type: *No Product type* /     Current Problem   1. History of total knee arthroplasty, right  Follow Up In Physical Therapy          Subjective   General   Pt reports she has been performing exercises faithfully. Also feels like she is having less swelling in right knee.       Precautions: Post-op  Pain 0/10  Post Treatment Pain Level 0/10    Objective   Right knee extension: 3 degrees     Treatments:  NuStep L5 x6 minutes  Gastroc stretch at wedge, 30 seconds x 3  Stand hamstring stretch, 30 seconds x 3  2nd step knee flexion, 2x10   Heel raises, 2x10  Stand hip ABD, 2x10 ea R/L  Stand MIP, 2x10 ea R/L   LAQ, 2x10   QS, 2x10  Heel slides, 2x10  SLR, 2x10       Assessment   Assessment:   Pt tolerated there ex progressions without experiencing pain in right knee. Initial cues required for all exercises to ensure adequate ROM.      Plan: AROM and strength    OP EDUCATION: Reviewed HEP      Goals:   Active       Mobility       Goal 1       Start:  03/28/24    Expected End:  06/26/24       Pt will improve right knee AROM to WNL to improve I/ADLs         Goal 2       Start:  03/28/24    Expected End:  06/26/24       Pt will improve right LE strength to 5/5 to improve I/ADLs.            Pain       Goal 1       Start:  03/28/24    Expected End:  06/26/24       Pt will perform all housework with 0/10 pain and no difficulty.         Goal 2       Start:  03/28/24    Expected End:  06/26/24       Pt will stand/walk >30 min with 0/10 pain to improve I/ADLs.

## 2024-04-17 ENCOUNTER — OFFICE VISIT (OUTPATIENT)
Dept: ORTHOPEDIC SURGERY | Facility: CLINIC | Age: 79
End: 2024-04-17
Payer: MEDICARE

## 2024-04-17 VITALS — BODY MASS INDEX: 29.64 KG/M2 | WEIGHT: 178.13 LBS

## 2024-04-17 DIAGNOSIS — G89.29 CHRONIC PAIN OF RIGHT KNEE: Primary | ICD-10-CM

## 2024-04-17 DIAGNOSIS — M25.561 CHRONIC PAIN OF RIGHT KNEE: Primary | ICD-10-CM

## 2024-04-17 DIAGNOSIS — Z96.651 S/P TOTAL KNEE REPLACEMENT, RIGHT: ICD-10-CM

## 2024-04-17 PROCEDURE — 1036F TOBACCO NON-USER: CPT | Performed by: PHYSICIAN ASSISTANT

## 2024-04-17 PROCEDURE — 1160F RVW MEDS BY RX/DR IN RCRD: CPT | Performed by: PHYSICIAN ASSISTANT

## 2024-04-17 PROCEDURE — 99024 POSTOP FOLLOW-UP VISIT: CPT | Performed by: PHYSICIAN ASSISTANT

## 2024-04-17 PROCEDURE — 1159F MED LIST DOCD IN RCRD: CPT | Performed by: PHYSICIAN ASSISTANT

## 2024-04-17 ASSESSMENT — ENCOUNTER SYMPTOMS
OCCASIONAL FEELINGS OF UNSTEADINESS: 0
LOSS OF SENSATION IN FEET: 0
DEPRESSION: 0

## 2024-04-17 ASSESSMENT — PAIN DESCRIPTION - DESCRIPTORS: DESCRIPTORS: ACHING;BURNING

## 2024-04-17 ASSESSMENT — PAIN - FUNCTIONAL ASSESSMENT: PAIN_FUNCTIONAL_ASSESSMENT: 0-10

## 2024-04-17 ASSESSMENT — LIFESTYLE VARIABLES: TOTAL SCORE: 0

## 2024-04-17 ASSESSMENT — PAIN SCALES - GENERAL: PAINLEVEL_OUTOF10: 0 - NO PAIN

## 2024-04-17 NOTE — PATIENT INSTRUCTIONS
Continue to rest, ice and elevate your knee.  Continue your pain regimen.   Continue Physical therapy.   Remember to call our office for antibiotics before dental work.   Use a cane or walker for support.   Do not kneel, twist, or squat and avoid heavy lifting.     Follow up as scheduled

## 2024-04-17 NOTE — PROGRESS NOTES
Subjective      Past Surgical History:   Procedure Laterality Date    APPENDECTOMY      BI STEREOTACTIC GUIDED BREAST RIGHT LOCALIZATION AND BIOPSY Right 09/24/2014    BI STEREOTACTIC GUIDED BREAST LOCALIZATION AND BIOPSY RIGHT [facility]    BI STEREOTACTIC GUIDED BREAST RIGHT LOCALIZATION AND BIOPSY Right 11/27/2017    BI STEREOTACTIC GUIDED BREAST LOCALIZATION AND BIOPSY RIGHT [facility]    BI US GUIDED BREAST LOCALIZATION AND BIOPSY LEFT Left 12/09/2022    BI US GUIDED BREAST LOCALIZATION AND BIOPSY LEFT [facility]    CATARACT EXTRACTION W/ INTRAOCULAR LENS  IMPLANT, BILATERAL      COLON SURGERY  1992    COLONOSCOPY      HYSTERECTOMY      KNEE ARTHROSCOPY W/ MENISCECTOMY Left     And Baker's cyst removal    KNEE SURGERY Right 03/13/2024    total    WISDOM TOOTH EXTRACTION            Patient History      This patient is s/p right total knee replacement done by Dr. Guerra on 3-. She has returned home from [facility]. They present today and state that their right knee pain is 1/10. They are slowly resuming their normal activities of daily living. She is working with outpatient physical therapy. She is seen today using a walker for support. They deny chest pain, shortness of breath.     There were no vitals taken for this visit.     ROS  CARDIOLOGY:   Negative for chest pain, shortness of breath.   RESPIRATORY:   Negative for chest pain, shortness of breath.   MUSCULOSKELETAL:   See HPI for details.   NEUROLOGY:   Negative for tingling, numbness, weakness.      Physical exam: Right knee: incision is clean dry and well healing. There are two areas of skin irritation at the medial aspect of the incision from previous dressing removal. No evidence of infection, cellulitis, fluctuance or abscess. Patient has painless ROM from 2-105 degrees. Nontender in the right calf. Neurovascular is intact. Patient is seen today walking with a stable gait.     AP and lateral x-rays of the right  knee done and read in office on previous office visit show a right total knee replacement in good position.    Lower extremity venous duplex right    Result Date: 3/15/2024           [place] [address]            Phone [phone]  Vascular Lab Report  [facility] US LOWER EXTREMITY VENOUS DUPLEX RIGHT Patient Name:      GIANCARLO CALZADA       Reading Physician:  [id number] Mayuri Sotelo MD, RPVI Study Date:        3/15/2024            Ordering Provider:  [id number] CHARI ZARAGOZA MRN/PID:           [id number]             Fellow: Accession#:        [id number]         Technologist:       Jazlyn Calderón RVT Date of Birth/Age: 1945 / 78 years Technologist 2: Gender:            F                    Encounter#:         [id number] Admission Status:  Inpatient            Location Performed: [facility]  Diagnosis/ICD: Localized (leg) edema-R60.0 Indication:    Limb swelling CPT Codes:     81965 Peripheral venous duplex scan for DVT Limited  Pertinent History: S/p right total knee arthroplasty 3/13/2024.  CONCLUSIONS:  Right Lower Venous: No evidence of acute deep vein thrombus visualized in the right lower extremity. Additional Findings; Heterogeneous sturcture noted in the popliteal fossa measuring approximately 2.9 x 1.5 x 0.6 cm. Findings could be consistent with complex/ruptured Baker's cyst or hematoma. No vascularity noted. Clinical correlation and imaging follow-up is recommended. Left Lower Venous: Left common femoral vein is negative for deep vein thrombus.  Imaging & Doppler Findings:  Right                 Compressible Thrombus        Flow Distal External Iliac                None       Pulsatile CFV                       Yes        None       Pulsatile PFV                       Yes        None FV Proximal               Yes        None        Pulsatile FV Mid                    Yes        None FV Distal                 Yes        None Popliteal                 Yes        None   Spontaneous/Phasic Peroneal                  Yes        None PTV                       Yes        None  Left Compress Thrombus        Flow CFV    Yes      None   Spontaneous/Phasic  [id number] MICHELLE Gonsales MD Electronically signed by [id number] MICHELLE Gonsales MD on 3/15/2024 at 6:43:19 PM  ** Final **     XR knee right 1-2 views    Result Date: 3/13/2024  Interpreted By:  Kevan Lucas, STUDY: XR KNEE RIGHT 1-2 VIEWS; 3/13/2024 11:53 am   INDICATION: Signs/Symptoms:Post op knee.   COMPARISON: 01/19/2024   ACCESSION NUMBER(S): [id number]   ORDERING CLINICIAN: MAGEN DANIEL   TECHNIQUE: AP and lateral views of the right knee.   FINDINGS: Normal appearance of femoral, tibial, and retropatellar components. There are expected postoperative changes of right total knee prosthesis. Expected postoperative changes of the soft tissues. No abnormal radiopaque foreign body.       Expected postoperative changes of right total knee prosthesis.   Signed by: Kevan Lucas 3/13/2024 12:14 PM Dictation workstation:   [id number]      Jessy was seen today for pain.  Diagnoses and all orders for this visit:  Chronic pain of right knee (Primary)  S/P total knee replacement, right  Options are discussed with the patient in detail. The patient is instructed to continue with physical therapy to evaluate and treat with gentle strengthening and ROM exercises with modalities as needed. The patient is instructed regarding total knee infection precautions, activity modification and risk for further injury with falling or trauma and to use a walker for support, ice, provider directed at home gentle strengthening and ROM exercises, and the appropriate use of Tylenol as needed for pain with its potential adverse reactions and side effects. The patient understands. Return in 4 weeks or sooner as  needed Please note that this report has been produced using speech recognition software.  It may contain errors related to grammar, punctuation or spelling.  Electronically signed, but not reviewed.

## 2024-04-22 ENCOUNTER — TREATMENT (OUTPATIENT)
Dept: PHYSICAL THERAPY | Facility: CLINIC | Age: 79
End: 2024-04-22
Payer: MEDICARE

## 2024-04-22 DIAGNOSIS — I49.1 PAC (PREMATURE ATRIAL CONTRACTION): ICD-10-CM

## 2024-04-22 DIAGNOSIS — I48.0 PAROXYSMAL ATRIAL FIBRILLATION (MULTI): ICD-10-CM

## 2024-04-22 DIAGNOSIS — Z96.651 HISTORY OF TOTAL KNEE ARTHROPLASTY, RIGHT: ICD-10-CM

## 2024-04-22 PROCEDURE — 97110 THERAPEUTIC EXERCISES: CPT | Mod: GP | Performed by: PHYSICAL THERAPIST

## 2024-04-22 RX ORDER — TRIAMTERENE/HYDROCHLOROTHIAZID 37.5-25 MG
0.5 TABLET ORAL EVERY MORNING
Qty: 90 TABLET | Refills: 1 | Status: SHIPPED | OUTPATIENT
Start: 2024-04-22 | End: 2025-04-17

## 2024-04-22 NOTE — PROGRESS NOTES
Physical Therapy Treatment    Patient Name: Jessy Mcdaniel  MRN: [id number]  Today's Date: 4/22/2024  Time Calculation  Start Time: 0955  Stop Time: 1036  Time Calculation (min): 41 min  PT Therapeutic Procedures Time Entry  Therapeutic Exercise Time Entry: 41    Insurance:  Visit number: 3 of 6  Authorization info: [place]   Insurance Type: Payor: MEDICARE / Plan: MEDICARE PART A AND B / Product Type: *No Product type* /     Current Problem   1. History of total knee arthroplasty, right  Follow Up In Physical Therapy          Subjective   General   Pt didn't know she would have some numbness along the outside of her right knee after surgery. Thinks this is why she isn't having pain.       Precautions: Post-op  Pain 0/10  Post Treatment Pain Level 0/10    Objective   Reciprocal pattern when ascending and descending stairs with 1 rail       Treatments:  Therapeutic Exercise:  NuStep L5 x6 minutes  Gastroc stretch at wedge, 30 seconds x 3  Stand hamstring stretch, 30 seconds x 3  2nd step knee flexion, 2x12   Stand hip ABD, 2x10 ea R/L  Seated Heel slides, 2x10  LAQ, 3x10   QS, 3x10      Assessment   Assessment:   Improved gait and stair negotiation in clinic and home environment without experiencing pain. Plan to discontinue 2WW by next visit.       Plan: Strengthening    OP EDUCATION: Numbness being normal after surgery. Updated HEP      Goals:   Active       Mobility       Goal 1       Start:  03/28/24    Expected End:  06/26/24       Pt will improve right knee AROM to WNL to improve I/ADLs         Goal 2       Start:  03/28/24    Expected End:  06/26/24       Pt will improve right LE strength to 5/5 to improve I/ADLs.            Pain       Goal 1       Start:  03/28/24    Expected End:  06/26/24       Pt will perform all housework with 0/10 pain and no difficulty.         Goal 2       Start:  03/28/24    Expected End:  06/26/24       Pt will stand/walk >30 min with 0/10 pain to improve I/ADLs.

## 2024-04-25 DIAGNOSIS — I48.0 PAROXYSMAL ATRIAL FIBRILLATION (MULTI): ICD-10-CM

## 2024-04-25 DIAGNOSIS — E03.9 ACQUIRED HYPOTHYROIDISM: ICD-10-CM

## 2024-04-25 DIAGNOSIS — I49.1 PAC (PREMATURE ATRIAL CONTRACTION): ICD-10-CM

## 2024-04-25 RX ORDER — LEVOTHYROXINE SODIUM 50 UG/1
50 TABLET ORAL DAILY
Qty: 90 TABLET | Refills: 3 | Status: SHIPPED | OUTPATIENT
Start: 2024-04-25

## 2024-04-29 ENCOUNTER — TREATMENT (OUTPATIENT)
Dept: PHYSICAL THERAPY | Facility: CLINIC | Age: 79
End: 2024-04-29
Payer: MEDICARE

## 2024-04-29 DIAGNOSIS — Z96.651 HISTORY OF TOTAL KNEE ARTHROPLASTY, RIGHT: ICD-10-CM

## 2024-04-29 PROCEDURE — 97110 THERAPEUTIC EXERCISES: CPT | Mod: GP | Performed by: PHYSICAL THERAPIST

## 2024-04-29 NOTE — PROGRESS NOTES
Physical Therapy Treatment    Patient Name: Jessy Mcdaniel  MRN: [id number]  Today's Date: 4/29/2024  Time Calculation  Start Time: 1020  Stop Time: 1100  Time Calculation (min): 40 min  PT Therapeutic Procedures Time Entry  Therapeutic Exercise Time Entry: 40    Insurance:  Visit number: 4 of 6  Authorization info: [place] [facility]   Insurance Type: Payor: MEDICARE / Plan: MEDICARE PART A AND B / Product Type: *No Product type* /    Current Problem   1. History of total knee arthroplasty, right  Follow Up In Physical Therapy          Subjective   General   Pt has been practicing on transfers and transitions from various surfaces. Feels she is doing well, especially with performance of home exercises.       Precautions: Post-op  Pain 2/10  Post Treatment Pain Level 0/10    Objective   Sit to stand without UE support from all surfaces    Treatments:  Therapeutic Exercise:  NuStep L5 x7 minutes  Gastroc stretch at wedge, 30 seconds x 3  Stand hamstring stretch, 30 seconds x 3  2nd step knee flexion, 2x12   Stand hip ABD, 2x10 ea R/L  Mini squats, 2x10   FWD step ups, 6inch 2x10  LAQ, 3x10  Sit to stand, 2x10       Assessment   Assessment:   Gait with excellent improvement since initial eval as she no longer requires use of assistive device and demonstrates no unsteadiness.     Plan: Strength    OP EDUCATION: Practice stairs as able     Goals:   Active       Mobility       Goal 1       Start:  03/28/24    Expected End:  06/26/24       Pt will improve right knee AROM to WNL to improve I/ADLs         Goal 2       Start:  03/28/24    Expected End:  06/26/24       Pt will improve right LE strength to 5/5 to improve I/ADLs.            Pain       Goal 1       Start:  03/28/24    Expected End:  06/26/24       Pt will perform all housework with 0/10 pain and no difficulty.         Goal 2       Start:  03/28/24    Expected End:  06/26/24       Pt will stand/walk >30 min with 0/10 pain to improve I/ADLs.

## 2024-05-03 ENCOUNTER — HOSPITAL ENCOUNTER (OUTPATIENT)
Dept: RADIOLOGY | Facility: CLINIC | Age: 79
Discharge: HOME | End: 2024-05-03
Payer: MEDICARE

## 2024-05-03 ENCOUNTER — OFFICE VISIT (OUTPATIENT)
Dept: ORTHOPEDIC SURGERY | Facility: CLINIC | Age: 79
End: 2024-05-03
Payer: MEDICARE

## 2024-05-03 DIAGNOSIS — Z96.651 S/P TOTAL KNEE REPLACEMENT, RIGHT: ICD-10-CM

## 2024-05-03 DIAGNOSIS — M25.561 CHRONIC PAIN OF RIGHT KNEE: Primary | ICD-10-CM

## 2024-05-03 DIAGNOSIS — G89.29 CHRONIC PAIN OF RIGHT KNEE: Primary | ICD-10-CM

## 2024-05-03 DIAGNOSIS — Z96.659 STATUS POST KNEE REPLACEMENT: ICD-10-CM

## 2024-05-03 PROCEDURE — 1036F TOBACCO NON-USER: CPT | Performed by: PHYSICIAN ASSISTANT

## 2024-05-03 PROCEDURE — 73560 X-RAY EXAM OF KNEE 1 OR 2: CPT | Mod: RIGHT SIDE | Performed by: ORTHOPAEDIC SURGERY

## 2024-05-03 PROCEDURE — 73560 X-RAY EXAM OF KNEE 1 OR 2: CPT | Mod: RT

## 2024-05-03 PROCEDURE — 1159F MED LIST DOCD IN RCRD: CPT | Performed by: PHYSICIAN ASSISTANT

## 2024-05-03 PROCEDURE — 1160F RVW MEDS BY RX/DR IN RCRD: CPT | Performed by: PHYSICIAN ASSISTANT

## 2024-05-03 PROCEDURE — 99024 POSTOP FOLLOW-UP VISIT: CPT | Performed by: PHYSICIAN ASSISTANT

## 2024-05-03 ASSESSMENT — PAIN SCALES - GENERAL: PAINLEVEL_OUTOF10: 0 - NO PAIN

## 2024-05-03 ASSESSMENT — PAIN DESCRIPTION - DESCRIPTORS: DESCRIPTORS: ACHING

## 2024-05-03 ASSESSMENT — PAIN - FUNCTIONAL ASSESSMENT: PAIN_FUNCTIONAL_ASSESSMENT: 0-10

## 2024-05-03 NOTE — PROGRESS NOTES
Subjective      Past Surgical History:   Procedure Laterality Date    APPENDECTOMY      BI STEREOTACTIC GUIDED BREAST RIGHT LOCALIZATION AND BIOPSY Right 09/24/2014    BI STEREOTACTIC GUIDED BREAST LOCALIZATION AND BIOPSY RIGHT [facility]    BI STEREOTACTIC GUIDED BREAST RIGHT LOCALIZATION AND BIOPSY Right 11/27/2017    BI STEREOTACTIC GUIDED BREAST LOCALIZATION AND BIOPSY RIGHT [facility]    BI US GUIDED BREAST LOCALIZATION AND BIOPSY LEFT Left 12/09/2022    BI US GUIDED BREAST LOCALIZATION AND BIOPSY LEFT [facility]    CATARACT EXTRACTION W/ INTRAOCULAR LENS  IMPLANT, BILATERAL      COLON SURGERY  1992    COLONOSCOPY      HYSTERECTOMY      KNEE ARTHROSCOPY W/ MENISCECTOMY Left     And Baker's cyst removal    KNEE SURGERY Right 03/13/2024    total    WISDOM TOOTH EXTRACTION            Patient History      This patient is s/p right total knee replacement done by Dr. Guerra on 3-. She has returned home from [facility]. They present today and state that their right knee pain is 1/10. They are slowly resuming their normal activities of daily living. She is working with outpatient physical therapy. She is walking independently without support.  They deny chest pain, shortness of breath.     There were no vitals taken for this visit.     ROS  CARDIOLOGY:   Negative for chest pain, shortness of breath.   RESPIRATORY:   Negative for chest pain, shortness of breath.   MUSCULOSKELETAL:   See HPI for details.   NEUROLOGY:   Negative for tingling, numbness, weakness.      Physical exam: Right knee: incision is clean dry and well healing. There are two areas of skin irritation at the medial aspect of the incision from previous dressing removal. No evidence of infection, cellulitis, fluctuance or abscess. Patient has painless ROM from 2-110 degrees. Nontender in the right calf. She has decreased sensation to light touch at the lateral aspect of the incision. Otherwise, Neurovascular is  intact. Patient is seen today walking with a stable gait.     AP and lateral x-rays of the right knee done and read in office on previous office visit show a right total knee replacement in good position.    Lower extremity venous duplex right    Result Date: 3/15/2024           [place] [id number]            Phone [phone]  Vascular Lab Report  [facility] US LOWER EXTREMITY VENOUS DUPLEX RIGHT Patient Name:      GIANCARLO CALZADA       Reading Physician:  [id number] Mayuri Sotelo MD, RPVI Study Date:        3/15/2024            Ordering Provider:  [id number] CHARI ZARAGOZA MRN/PID:           [id number]             Fellow: Accession#:        [id number]         Technologist:       Jazlyn Calderón RVRUPESH Date of Birth/Age: 1945 / 78 years Technologist 2: Gender:            F                    Encounter#:         [id number] Admission Status:  Inpatient            Location Performed: [facility]  Diagnosis/ICD: Localized (leg) edema-R60.0 Indication:    Limb swelling CPT Codes:     25440 Peripheral venous duplex scan for DVT Limited  Pertinent History: S/p right total knee arthroplasty 3/13/2024.  CONCLUSIONS:  Right Lower Venous: No evidence of acute deep vein thrombus visualized in the right lower extremity. Additional Findings; Heterogeneous sturcture noted in the popliteal fossa measuring approximately 2.9 x 1.5 x 0.6 cm. Findings could be consistent with complex/ruptured Baker's cyst or hematoma. No vascularity noted. Clinical correlation and imaging follow-up is recommended. Left Lower Venous: Left common femoral vein is negative for deep vein thrombus.  Imaging & Doppler Findings:  Right                 Compressible Thrombus        Flow Distal External Iliac                None       Pulsatile CFV                       Yes        None       Pulsatile  PFV                       Yes        None FV Proximal               Yes        None       Pulsatile FV Mid                    Yes        None FV Distal                 Yes        None Popliteal                 Yes        None   Spontaneous/Phasic Peroneal                  Yes        None PTV                       Yes        None  Left Compress Thrombus        Flow CFV    Yes      None   Spontaneous/Phasic  [id number] MICHELLE Gonsales MD Electronically signed by [id number] MICHELLE Gonsales MD on 3/15/2024 at 6:43:19 PM  ** Final **     XR knee right 1-2 views    Result Date: 3/13/2024  Interpreted By:  Kevan Lucas, STUDY: XR KNEE RIGHT 1-2 VIEWS; 3/13/2024 11:53 am   INDICATION: Signs/Symptoms:Post op knee.   COMPARISON: 01/19/2024   ACCESSION NUMBER(S): [id number]   ORDERING CLINICIAN: MAGEN DANIEL   TECHNIQUE: AP and lateral views of the right knee.   FINDINGS: Normal appearance of femoral, tibial, and retropatellar components. There are expected postoperative changes of right total knee prosthesis. Expected postoperative changes of the soft tissues. No abnormal radiopaque foreign body.       Expected postoperative changes of right total knee prosthesis.   Signed by: Kevan Lucas 3/13/2024 12:14 PM Dictation workstation:   [id number]      Diagnoses and all orders for this visit:  Chronic pain of right knee (Primary)  S/P total knee replacement, right  Options are discussed with the patient in detail. The patient is instructed to continue with physical therapy to evaluate and treat with gentle strengthening and ROM exercises with modalities as needed. The patient is instructed regarding total knee infection precautions, activity modification and risk for further injury with falling or trauma and to use a walker for support, ice, provider directed at home gentle strengthening and ROM exercises, and the appropriate use of Tylenol as needed for pain with its potential adverse reactions and side effects.  The patient understands. Return in 6 weeks or sooner as needed Please note that this report has been produced using speech recognition software.  It may contain errors related to grammar, punctuation or spelling.  Electronically signed, but not reviewed.

## 2024-05-03 NOTE — PATIENT INSTRUCTIONS
Continue to rest, ice and elevate your knee.  Continue your pain regimen.   Continue Physical therapy.   Remember to call our office for antibiotics before dental work.   Use a cane for support.   Do not kneel, twist, or squat and avoid heavy lifting.     Follow up with PC after 6-

## 2024-05-06 ENCOUNTER — TREATMENT (OUTPATIENT)
Dept: PHYSICAL THERAPY | Facility: CLINIC | Age: 79
End: 2024-05-06
Payer: MEDICARE

## 2024-05-06 DIAGNOSIS — Z96.651 HISTORY OF TOTAL KNEE ARTHROPLASTY, RIGHT: ICD-10-CM

## 2024-05-06 PROCEDURE — 97110 THERAPEUTIC EXERCISES: CPT | Mod: GP | Performed by: PHYSICAL THERAPIST

## 2024-05-06 NOTE — PROGRESS NOTES
Physical Therapy Treatment    Patient Name: Jesys Mcdaniel  MRN: [id number]  Today's Date: 5/6/2024  Time Calculation  Start Time: 1020  Stop Time: 1104  Time Calculation (min): 44 min  PT Therapeutic Procedures Time Entry  Therapeutic Exercise Time Entry: 44    Insurance:  Visit number: 5 of 6  Authorization info: [place] [facility]   Insurance Type: Payor: MEDICARE / Plan: MEDICARE PART A AND B / Product Type: *No Product type* /     Current Problem   1. History of total knee arthroplasty, right  Follow Up In Physical Therapy          Subjective   General   Returned to MD office last week and they are happy with her progress. She started driving yesterday with no problem.       Precautions: Post-op  Pain 0/10  Post Treatment Pain Level 0/10    Objective   Right knee flexion: 114 degrees       Treatments:  Therapeutic Exercise:  NuStep L5 x7 minutes  Gastroc stretch at wedge, 30 seconds x 3  Stand hamstring stretch, 30 seconds x 3  2nd step knee flexion, 2x12   Mini squats, 2x10   FWD step ups, 6inch 2x10  LAT step ups, 6inch 2x10  Sit to stand, 2x12        Assessment   Assessment:   Good progress with AROM since initial visit as she has achieved post-surgical expectations. Able to progress to lateral based motions to incorporate all directions.       Plan:  Reassess next session, anticipate discharge     OP EDUCATION: Add side steps at stairs, try walking down driveway      Goals:   Active       Mobility       Goal 1       Start:  03/28/24    Expected End:  06/26/24       Pt will improve right knee AROM to WNL to improve I/ADLs         Goal 2       Start:  03/28/24    Expected End:  06/26/24       Pt will improve right LE strength to 5/5 to improve I/ADLs.            Pain       Goal 1       Start:  03/28/24    Expected End:  06/26/24       Pt will perform all housework with 0/10 pain and no difficulty.         Goal 2       Start:  03/28/24    Expected End:  06/26/24       Pt will stand/walk >30 min with 0/10 pain to improve  I/ADLs.

## 2024-05-13 ENCOUNTER — TREATMENT (OUTPATIENT)
Dept: PHYSICAL THERAPY | Facility: CLINIC | Age: 79
End: 2024-05-13
Payer: MEDICARE

## 2024-05-13 DIAGNOSIS — Z96.651 HISTORY OF TOTAL KNEE ARTHROPLASTY, RIGHT: ICD-10-CM

## 2024-05-13 PROCEDURE — 97110 THERAPEUTIC EXERCISES: CPT | Mod: GP | Performed by: PHYSICAL THERAPIST

## 2024-05-13 NOTE — PROGRESS NOTES
Physical Therapy Treatment/Discharge Report    Patient Name: Jessy Mcdaniel  MRN: [id number]  Today's Date: 5/13/2024  Time Calculation  Start Time: 1025  Stop Time: 1107  Time Calculation (min): 42 min  PT Therapeutic Procedures Time Entry  Therapeutic Exercise Time Entry: 41    Insurance:  Visit number: 6 of 6  [place] info[place]  [place] Type: Payor: MEDICARE / Plan: MEDICARE PART A AND B / Product Type: *No Product type* /     Current Problem   1. History of total knee arthroplasty, right  Follow Up In Physical Therapy          Subjective   General   Pt driving, walking, doing stairs, and housework without issues or pain in right knee.      Precautions: Post-op  Pain 0/10  Post Treatment Pain Level 0/10    Objective   Right knee AROM:  Flex 123 deg  Ext 0 deg    Right LE strength:  Hip flex 5/5  Knee flex 5/5  Knee ext 5/5    Flexibility: WNL  Gait: Ambulates without deficits and no assistive device     Treatments:  Therapeutic Exercise:  SciFit LE only x6 minutes  Gastroc stretch at wedge, 30 seconds x 3  Stand hamstring stretch, 30 seconds x 3  2nd step knee flexion, 3x10   Mini squats, 2x10   LAT step ups, 6inch 2x10  Sit to stand, 2x12      Assessment   Assessment:   Pt with good progress during therapy and has met all her goals. She has normal and improved ROM, strength, and is no longer having gait deficits. Pt to be discharged at this time and continue with HEP on her own. She is in good understanding.     Plan: Discharge    OP EDUCATION: Cont with HEP on own, reviewed and provided handout     Goals:   Resolved       Mobility       Goal 1 (Met)       Start:  03/28/24    Expected End:  06/26/24    Resolved:  05/13/24    Pt will improve right knee AROM to WNL to improve I/ADLs         Goal 2 (Met)       Start:  03/28/24    Expected End:  06/26/24    Resolved:  05/13/24    Pt will improve right LE strength to 5/5 to improve I/ADLs.            Pain       Goal 1 (Met)       Start:  03/28/24    Expected  End:  06/26/24    Resolved:  05/13/24    Pt will perform all housework with 0/10 pain and no difficulty.         Goal 2 (Met)       Start:  03/28/24    Expected End:  06/26/24    Resolved:  05/13/24    Pt will stand/walk >30 min with 0/10 pain to improve I/ADLs.

## 2024-05-23 ENCOUNTER — TELEPHONE (OUTPATIENT)
Dept: PRIMARY CARE | Facility: CLINIC | Age: 79
End: 2024-05-23
Payer: MEDICARE

## 2024-05-23 DIAGNOSIS — N95.2 ATROPHIC VAGINITIS: Primary | ICD-10-CM

## 2024-05-23 NOTE — TELEPHONE ENCOUNTER
Patient called and inquired if she needs refill on Estradiol. I did not see medication in chart. Please advise.

## 2024-05-24 NOTE — TELEPHONE ENCOUNTER
Patient was on Estradiol 10mcg vaginal tablet, insert one tab twice a week. Med was stopped during hospital admission on 3/13/24 and never restarted. Patient stated she has been on that medication for a long time. Has 3 weeks left of medication. Has appt 6/18/24 with SERA. Patient requested script sent to pharmacy if LB feels she still needs to be on it. Please advise.

## 2024-05-28 ENCOUNTER — TELEPHONE (OUTPATIENT)
Dept: ORTHOPEDIC SURGERY | Facility: CLINIC | Age: 79
End: 2024-05-28
Payer: MEDICARE

## 2024-05-28 NOTE — TELEPHONE ENCOUNTER
Patient called and states the following   She is to call if she has any questions and set up appt with dr Guerra   Foot on surgical side has been swollen since surgery   Leg has been swollen for a few weeks   Pain developing in thigh above knee replacement  Foot now red and blue tint for 3 days.     Please advice    Anesthesia Volume In Cc: 0 Did You Provide Opioid Counseling: No Repair Type: Complex Repair Which Eyelid Repair Cpt Are You Using?: 05335 Suturegard Retention Suture: 2-0 Nylon Retention Suture Bite Size: 3 mm Length To Time In Minutes Device Was In Place: 10 Number Of Hemigard Strips Per Side: 1 Simple / Intermediate / Complex Repair - Final Wound Length In Cm: 5.1 Complex Requirements: Extensive Undermining Performed?: Yes Width Of Defect Perpendicular To Closure In Cm (Required): 1.3 Distance Of Undermining In Cm (Required): 2.3 Undermining Type: Entire Wound Debridement Text: The wound edges were debrided prior to proceeding with the closure to facilitate wound healing. Helical Rim Text: The closure involved the helical rim. Vermilion Border Text: The closure involved the vermilion border. Nostril Rim Text: The closure involved the nostril rim. Retention Suture Text: Retention sutures were placed to support the closure and prevent dehiscence. Skin Substitute: EpiFix Micronized Detail Level: Detailed Anesthesia Type: 1% lidocaine with 1:100,000 epinephrine and 408mcg clindamycin/ml and a 1:10 solution of 8.4% sodium bicarbonate Hemostasis: Electrodesiccation Estimated Blood Loss (Cc): minimal Brow Lift Text: A midfrontal incision was made medially to the defect to allow access to the tissues just superior to the left eyebrow. Following careful dissection inferiorly in a supraperiosteal plane to the level of the left eyebrow, several 3-0 monocryl sutures were used to resuspend the eyebrow orbicularis oculi muscular unit to the superior frontal bone periosteum. This resulted in an appropriate reapproximation of static eyebrow symmetry and correction of the left brow ptosis. Deep Sutures: 4-0 Vicryl Epidermal Sutures: 5-0 Fast Absorbing Gut Epidermal Closure: running Wound Care: No ointment Dressing: steri-strips and pressure dressing Suturegard Intro: Intraoperative tissue expansion was performed, utilizing the SUTUREGARD device, in order to reduce wound tension. Suturegard Body: The suture ends were repeatedly re-tightened and re-clamped to achieve the desired tissue expansion. Hemigard Intro: Due to skin fragility and wound tension, it was decided to use HEMIGARD adhesive retention suture devices to permit a linear closure. The skin was cleaned and dried for a 6cm distance away from the wound. Excessive hair, if present, was removed to allow for adhesion. Hemigard Postcare Instructions: The HEMIGARD strips are to remain completely dry for at least 5-7 days. Epidermal Closure Graft Donor Site (Optional): simple interrupted Graft Donor Site Bandage (Optional-Leave Blank If You Don't Want In Note): Steri-strips and a pressure bandage were applied to the donor site. Closure 2 Information: This tab is for additional flaps and grafts, including complex repair and grafts and complex repair and flaps. You can also specify a different location for the additional defect, if the location is the same you do not need to select a new one. We will insert the automated text for the repair you select below just as we do for solitary flaps and grafts. Please note that at this time if you select a location with a different insurance zone you will need to override the ICD10 and CPT if appropriate. Closure 3 Information: This tab is for additional flaps and grafts above and beyond our usual structured repairs.  Please note if you enter information here it will not currently bill and you will need to add the billing information manually. Complex Repair Preamble Text (Leave Blank If You Do Not Want): Extensive wide undermining was performed. Intermediate Repair Preamble Text (Leave Blank If You Do Not Want): Undermining was performed with blunt dissection. Flap Thinning Complex Repair Preamble Text (Leave Blank If You Do Not Want): An incision was made along the previous flap suture line. Undermining was performed beneath the flap and redundant tissue was removed to restore the normal contour of the skin. Non-Graft Cartilage Fenestration Text: The cartilage was fenestrated with a 2mm punch biopsy to help facilitate healing. Graft Cartilage Fenestration Text: The cartilage was fenestrated with a 2mm punch biopsy to help facilitate graft survival and healing. Preparation Of Recipient Site - Flap: The eschar was removed surgically with sharp dissection to facilitate appropriate wound healing of the following adjacent tissue rearrangement. Preparation Of Recipient Site - Graft: The eschar was removed surgically with sharp dissection to facilitate appropriate survival of the following graft. Preparation Of Recipient Site - Flap Takedown: The eschar and granulation tissue was removed surgically with sharp dissection to facilitate appropriate healing after division and inset of the proximal and distal interpolation flap. Secondary Intention Text (Leave Blank If You Do Not Want): The defect will heal with secondary intention. No Repair - Repaired With Adjacent Surgical Defect Text (Leave Blank If You Do Not Want): After obtaining clear surgical margins the defect was repaired concurrently with another surgical defect which was in close approximation. Referred To Oculoplastics For Closure Text (Leave Blank If You Do Not Want): After obtaining clear surgical margins the patient was sent to oculoplastics for surgical repair.  The patient understands they will receive post-surgical care and follow-up from the referring physician's office. Referred To Otolaryngology For Closure Text (Leave Blank If You Do Not Want): After obtaining clear surgical margins the patient was sent to otolaryngology for surgical repair.  The patient understands they will receive post-surgical care and follow-up from the referring physician's office. Referred To Plastics For Closure Text (Leave Blank If You Do Not Want): After obtaining clear surgical margins the patient was sent to plastics for surgical repair.  The patient understands they will receive post-surgical care and follow-up from the referring physician's office. Referred To Asc For Closure Text (Leave Blank If You Do Not Want): After obtaining clear surgical margins the patient was sent to an ASC for surgical repair.  The patient understands they will receive post-surgical care and follow-up from the ASC physician. Referred To Mid-Level For Closure Text (Leave Blank If You Do Not Want): After obtaining clear surgical margins the patient was sent to a mid-level provider for surgical repair.  The patient understands they will receive post-surgical care and follow-up from the mid-level provider. Repair Performed By Another Provider Text (Leave Blank If You Do Not Want): After obtaining clear surgical margins the defect was repaired by another provider. Adjacent Tissue Transfer Text: The defect edges were debeveled with a #15 scalpel blade. Given the location of the defect and the proximity to free margins an adjacent tissue transfer was deemed most appropriate. Using a sterile surgical marker, an appropriate flap was drawn incorporating the defect and placing the expected incisions within the relaxed skin tension lines where possible. The area thus outlined was incised deep to adipose tissue with a #15 scalpel blade. The skin margins were undermined to an appropriate distance in all directions utilizing iris scissors and carried over to close the primary defect. Advancement Flap (Single) Text: The defect edges were debeveled with a #15 scalpel blade. Given the location of the defect and the proximity to free margins a single advancement flap was deemed most appropriate. Using a sterile surgical marker, an appropriate advancement flap was drawn incorporating the defect and placing the expected incisions within the relaxed skin tension lines where possible. The area thus outlined was incised deep to adipose tissue with a #15 scalpel blade. The skin margins were undermined to an appropriate distance in all directions utilizing iris scissors. Following this, the designed flap was advanced and carried over into the primary defect and sutured into place. Advancement Flap (Double) Text: The defect edges were debeveled with a #15 scalpel blade. Given the location of the defect and the proximity to free margins a double advancement flap was deemed most appropriate. Using a sterile surgical marker, the appropriate advancement flaps were drawn incorporating the defect and placing the expected incisions within the relaxed skin tension lines where possible. The area thus outlined was incised deep to adipose tissue with a #15 scalpel blade. The skin margins were undermined to an appropriate distance in all directions utilizing iris scissors. Following this, the designed flaps were advanced and carried over into the primary defect and sutured into place. Burow's Advancement Flap Text: The defect edges were debeveled with a #15 scalpel blade. Given the location of the defect and the proximity to free margins a Burow's advancement flap was deemed most appropriate. Using a sterile surgical marker, the appropriate advancement flap was drawn incorporating the defect and placing the expected incisions within the relaxed skin tension lines where possible. The area thus outlined was incised deep to adipose tissue with a #15 scalpel blade. The skin margins were undermined to an appropriate distance in all directions utilizing iris scissors. Following this, the designed flap was advanced and carried over into the primary defect and sutured into place. Chonodrocutaneous Helical Advancement Flap Text: The defect edges were debeveled with a #15 scalpel blade. Given the location of the defect and the proximity to free margins a chondrocutaneous helical advancement flap was deemed most appropriate. Using a sterile surgical marker, the appropriate advancement flap was drawn incorporating the defect and placing the expected incisions within the relaxed skin tension lines where possible. The area thus outlined was incised deep to adipose tissue with a #15 scalpel blade. The skin margins were undermined to an appropriate distance in all directions utilizing iris scissors. Following this, the designed flap was advanced and carried over into the primary defect and sutured into place. Crescentic Advancement Flap Text: The defect edges were debeveled with a #15 scalpel blade. Given the location of the defect and the proximity to free margins a crescentic advancement flap was deemed most appropriate. Using a sterile surgical marker, the appropriate advancement flap was drawn incorporating the defect and placing the expected incisions within the relaxed skin tension lines where possible. The area thus outlined was incised deep to adipose tissue with a #15 scalpel blade. The skin margins were undermined to an appropriate distance in all directions utilizing iris scissors. Following this, the designed flap was advanced and carried over into the primary defect and sutured into place. A-T Advancement Flap Text: The defect edges were debeveled with a #15 scalpel blade. Given the location of the defect, shape of the defect and the proximity to free margins an A-T advancement flap was deemed most appropriate. Using a sterile surgical marker, an appropriate advancement flap was drawn incorporating the defect and placing the expected incisions within the relaxed skin tension lines where possible. The area thus outlined was incised deep to adipose tissue with a #15 scalpel blade. The skin margins were undermined to an appropriate distance in all directions utilizing iris scissors. Following this, the designed flap was advanced and carried over into the primary defect and sutured into place. O-T Advancement Flap Text: The defect edges were debeveled with a #15 scalpel blade. Given the location of the defect, shape of the defect and the proximity to free margins an O-T advancement flap was deemed most appropriate. Using a sterile surgical marker, an appropriate advancement flap was drawn incorporating the defect and placing the expected incisions within the relaxed skin tension lines where possible. The area thus outlined was incised deep to adipose tissue with a #15 scalpel blade. The skin margins were undermined to an appropriate distance in all directions utilizing iris scissors. Following this, the designed flap was advanced and carried over into the primary defect and sutured into place. O-L Flap Text: The defect edges were debeveled with a #15 scalpel blade. Given the location of the defect, shape of the defect and the proximity to free margins an O-L flap was deemed most appropriate. Using a sterile surgical marker, an appropriate advancement flap was drawn incorporating the defect and placing the expected incisions within the relaxed skin tension lines where possible. The area thus outlined was incised deep to adipose tissue with a #15 scalpel blade. The skin margins were undermined to an appropriate distance in all directions utilizing iris scissors. Following this, the designed flap was advanced and carried over into the primary defect and sutured into place. O-Z Flap Text: The defect edges were debeveled with a #15 scalpel blade. Given the location of the defect, shape of the defect and the proximity to free margins an O-Z flap was deemed most appropriate. Using a sterile surgical marker, an appropriate transposition flap was drawn incorporating the defect and placing the expected incisions within the relaxed skin tension lines where possible. The area thus outlined was incised deep to adipose tissue with a #15 scalpel blade. The skin margins were undermined to an appropriate distance in all directions utilizing iris scissors. Following this, the designed flap was carried over into the primary defect and sutured into place. Double O-Z Flap Text: The defect edges were debeveled with a #15 scalpel blade. Given the location of the defect, shape of the defect and the proximity to free margins a Double O-Z flap was deemed most appropriate. Using a sterile surgical marker, an appropriate transposition flap was drawn incorporating the defect and placing the expected incisions within the relaxed skin tension lines where possible. The area thus outlined was incised deep to adipose tissue with a #15 scalpel blade. The skin margins were undermined to an appropriate distance in all directions utilizing iris scissors. Following this, the designed flap was carried over into the primary defect and sutured into place. V-Y Flap Text: The defect edges were debeveled with a #15 scalpel blade. Given the location of the defect, shape of the defect and the proximity to free margins a V-Y flap was deemed most appropriate. Using a sterile surgical marker, an appropriate advancement flap was drawn incorporating the defect and placing the expected incisions within the relaxed skin tension lines where possible. The area thus outlined was incised deep to adipose tissue with a #15 scalpel blade. The skin margins were undermined to an appropriate distance in all directions utilizing iris scissors. Following this, the designed flap was advanced and carried over into the primary defect and sutured into place. Advancement-Rotation Flap Text: The defect edges were debeveled with a #15 scalpel blade. Given the location of the defect, shape of the defect and the proximity to free margins an advancement-rotation flap was deemed most appropriate. Using a sterile surgical marker, an appropriate flap was drawn incorporating the defect and placing the expected incisions within the relaxed skin tension lines where possible. The area thus outlined was incised deep to adipose tissue with a #15 scalpel blade. The skin margins were undermined to an appropriate distance in all directions utilizing iris scissors. Following this, the designed flap was carried over into the primary defect and sutured into place. Mercedes Flap Text: The defect edges were debeveled with a #15 scalpel blade. Given the location of the defect, shape of the defect and the proximity to free margins a Mercedes flap was deemed most appropriate. Using a sterile surgical marker, an appropriate advancement flap was drawn incorporating the defect and placing the expected incisions within the relaxed skin tension lines where possible. The area thus outlined was incised deep to adipose tissue with a #15 scalpel blade. The skin margins were undermined to an appropriate distance in all directions utilizing iris scissors. Following this, the designed flap was advanced and carried over into the primary defect and sutured into place. Modified Advancement Flap Text: The defect edges were debeveled with a #15 scalpel blade. Given the location of the defect, shape of the defect and the proximity to free margins a modified advancement flap was deemed most appropriate. Using a sterile surgical marker, an appropriate advancement flap was drawn incorporating the defect and placing the expected incisions within the relaxed skin tension lines where possible. The area thus outlined was incised deep to adipose tissue with a #15 scalpel blade. The skin margins were undermined to an appropriate distance in all directions utilizing iris scissors. Following this, the designed flap was advanced and carried over into the primary defect and sutured into place. Mucosal Advancement Flap Text: Given the location of the defect, shape of the defect and the proximity to free margins a mucosal advancement flap was deemed most appropriate. Incisions were made with a 15 blade scalpel in the appropriate fashion along the cutaneous vermilion border and the mucosal lip. The remaining actinically damaged mucosal tissue was excised.  The mucosal advancement flap was then elevated to the gingival sulcus with care taken to preserve the neurovascular structures and advanced into the primary defect. Care was taken to ensure that precise realignment of the vermilion border was achieved. Peng Advancement Flap Text: The defect edges were debeveled with a #15 scalpel blade. Given the location of the defect, shape of the defect and the proximity to free margins a Peng advancement flap was deemed most appropriate. Using a sterile surgical marker, an appropriate advancement flap was drawn incorporating the defect and placing the expected incisions within the relaxed skin tension lines where possible. The area thus outlined was incised deep to adipose tissue with a #15 scalpel blade. The skin margins were undermined to an appropriate distance in all directions utilizing iris scissors. Following this, the designed flap was advanced and carried over into the primary defect and sutured into place. Hatchet Flap Text: The defect edges were debeveled with a #15 scalpel blade. Given the location of the defect, shape of the defect and the proximity to free margins a hatchet flap was deemed most appropriate. Using a sterile surgical marker, an appropriate hatchet flap was drawn incorporating the defect and placing the expected incisions within the relaxed skin tension lines where possible. The area thus outlined was incised deep to adipose tissue with a #15 scalpel blade. The skin margins were undermined to an appropriate distance in all directions utilizing iris scissors. Following this, the designed flap was carried over into the primary defect and sutured into place. Rotation Flap Text: The defect edges were debeveled with a #15 scalpel blade. Given the location of the defect, shape of the defect and the proximity to free margins a rotation flap was deemed most appropriate. Using a sterile surgical marker, an appropriate rotation flap was drawn incorporating the defect and placing the expected incisions within the relaxed skin tension lines where possible. The area thus outlined was incised deep to adipose tissue with a #15 scalpel blade. The skin margins were undermined to an appropriate distance in all directions utilizing iris scissors. Following this, the designed flap was carried over into the primary defect and sutured into place. Bilateral Rotation Flap Text: The defect edges were debeveled with a #15 scalpel blade. Given the location of the defect, shape of the defect and the proximity to free margins a bilateral rotation flap was deemed most appropriate. Using a sterile surgical marker, an appropriate rotation flap was drawn incorporating the defect and placing the expected incisions within the relaxed skin tension lines where possible. The area thus outlined was incised deep to adipose tissue with a #15 scalpel blade. The skin margins were undermined to an appropriate distance in all directions utilizing iris scissors. Following this, the designed flap was carried over into the primary defect and sutured into place. Spiral Flap Text: The defect edges were debeveled with a #15 scalpel blade. Given the location of the defect, shape of the defect and the proximity to free margins a spiral flap was deemed most appropriate. Using a sterile surgical marker, an appropriate rotation flap was drawn incorporating the defect and placing the expected incisions within the relaxed skin tension lines where possible. The area thus outlined was incised deep to adipose tissue with a #15 scalpel blade. The skin margins were undermined to an appropriate distance in all directions utilizing iris scissors. Following this, the designed flap was carried over into the primary defect and sutured into place. Staged Advancement Flap Text: The defect edges were debeveled with a #15 scalpel blade. Given the location of the defect, shape of the defect and the proximity to free margins a staged advancement flap was deemed most appropriate. Using a sterile surgical marker, an appropriate advancement flap was drawn incorporating the defect and placing the expected incisions within the relaxed skin tension lines where possible. The area thus outlined was incised deep to adipose tissue with a #15 scalpel blade. The skin margins were undermined to an appropriate distance in all directions utilizing iris scissors. Following this, the designed flap was carried over into the primary defect and sutured into place. Star Wedge Flap Text: The defect edges were debeveled with a #15 scalpel blade. Given the location of the defect, shape of the defect and the proximity to free margins a star wedge flap was deemed most appropriate. Using a sterile surgical marker, an appropriate rotation flap was drawn incorporating the defect and placing the expected incisions within the relaxed skin tension lines where possible. The area thus outlined was incised deep to adipose tissue with a #15 scalpel blade. The skin margins were undermined to an appropriate distance in all directions utilizing iris scissors. Following this, the designed flap was carried over into the primary defect and sutured into place. Transposition Flap Text: The defect edges were debeveled with a #15 scalpel blade. Given the location of the defect and the proximity to free margins a transposition flap was deemed most appropriate. Using a sterile surgical marker, an appropriate transposition flap was drawn incorporating the defect. The area thus outlined was incised deep to adipose tissue with a #15 scalpel blade. The skin margins were undermined to an appropriate distance in all directions utilizing iris scissors. Following this, the designed flap was carried over into the primary defect and sutured into place. Muscle Hinge Flap Text: The defect edges were debeveled with a #15 scalpel blade.  Given the size, depth and location of the defect and the proximity to free margins a muscle hinge flap was deemed most appropriate. Using a sterile surgical marker, an appropriate hinge flap was drawn incorporating the defect. The area thus outlined was incised with a #15 scalpel blade. The skin margins were undermined to an appropriate distance in all directions utilizing iris scissors. Following this, the designed flap was carried into the primary defect and sutured into place. Mustarde Flap Text: The defect edges were debeveled with a #15 scalpel blade.  Given the size, depth and location of the defect and the proximity to free margins a Mustarde flap was deemed most appropriate. Using a sterile surgical marker, an appropriate flap was drawn incorporating the defect. The area thus outlined was incised with a #15 scalpel blade. The skin margins were undermined to an appropriate distance in all directions utilizing iris scissors. Following this, the designed flap was carried into the primary defect and sutured into place. Nasal Turnover Hinge Flap Text: The defect edges were debeveled with a #15 scalpel blade.  Given the size, depth, location of the defect and the defect being full thickness a nasal turnover hinge flap was deemed most appropriate. Using a sterile surgical marker, an appropriate hinge flap was drawn incorporating the defect. The area thus outlined was incised with a #15 scalpel blade. The flap was designed to recreate the nasal mucosal lining and the alar rim. The skin margins were undermined to an appropriate distance in all directions utilizing iris scissors. Following this, the designed flap was carried over into the primary defect and sutured into place Nasalis-Muscle-Based Myocutaneous Island Pedicle Flap Text: Using a #15 blade, an incision was made around the donor flap to the level of the nasalis muscle. Wide lateral undermining was then performed in both the subcutaneous plane above the nasalis muscle, and in a submuscular plane just above periosteum. This allowed the formation of a free nasalis muscle axial pedicle (based on the angular artery) which was still attached to the actual cutaneous flap, increasing its mobility and vascular viability. Hemostasis was obtained with pinpoint electrocoagulation. The flap was mobilized into position and the pivotal anchor points positioned and stabilized with buried interrupted sutures. Subcutaneous and dermal tissues were closed in a multilayered fashion with sutures. Tissue redundancies were excised, and the epidermal edges were apposed without significant tension and sutured with sutures. Nasalis Myocutaneous Flap Text: Using a #15 blade, an incision was made around the donor flap to the level of the nasalis muscle. Wide lateral undermining was then performed in both the subcutaneous plane above the nasalis muscle, and in a submuscular plane just above periosteum. This allowed the formation of a free nasalis muscle axial pedicle which was still attached to the actual cutaneous flap, increasing its mobility and vascular viability. Hemostasis was obtained with pinpoint electrocoagulation. The flap was mobilized into position and the pivotal anchor points positioned and stabilized with buried interrupted sutures. Subcutaneous and dermal tissues were closed in a multilayered fashion with sutures. Tissue redundancies were excised, and the epidermal edges were apposed without significant tension and sutured with sutures. Orbicularis Oris Muscle Flap Text: The defect edges were debeveled with a #15 scalpel blade.  Given that the defect affected the competency of the oral sphincter an orbicularis oris muscle flap was deemed most appropriate to restore this competency and normal muscle function.  Using a sterile surgical marker, an appropriate flap was drawn incorporating the defect. The area thus outlined was incised with a #15 scalpel blade. Following this, the designed flap was carried over into the primary defect and sutured into place. Melolabial Transposition Flap Text: The defect edges were debeveled with a #15 scalpel blade. Given the location of the defect and the proximity to free margins a melolabial flap was deemed most appropriate. Using a sterile surgical marker, an appropriate melolabial transposition flap was drawn incorporating the defect. The area thus outlined was incised deep to adipose tissue with a #15 scalpel blade. The skin margins were undermined to an appropriate distance in all directions utilizing iris scissors. Following this, the designed flap was carried over into the primary defect and sutured into place. Rectangular Flap Text: The defect edges were debeveled with a #15 scalpel blade. Given the location of the defect and the proximity to free margins a rectangular flap was deemed most appropriate. Using a sterile surgical marker, an appropriate rectangular flap was drawn incorporating the defect. The area thus outlined was incised deep to adipose tissue with a #15 scalpel blade. The skin margins were undermined to an appropriate distance in all directions utilizing iris scissors. Following this, the designed flap was carried over into the primary defect and sutured into place. Rhombic Flap Text: The defect edges were debeveled with a #15 scalpel blade. Given the location of the defect and the proximity to free margins a rhombic flap was deemed most appropriate. Using a sterile surgical marker, an appropriate rhombic flap was drawn incorporating the defect. The area thus outlined was incised deep to adipose tissue with a #15 scalpel blade. The skin margins were undermined to an appropriate distance in all directions utilizing iris scissors. Following this, the designed flap was carried over into the primary defect and sutured into place. Rhomboid Transposition Flap Text: The defect edges were debeveled with a #15 scalpel blade. Given the location of the defect and the proximity to free margins a rhomboid transposition flap was deemed most appropriate. Using a sterile surgical marker, an appropriate rhomboid flap was drawn incorporating the defect. The area thus outlined was incised deep to adipose tissue with a #15 scalpel blade. The skin margins were undermined to an appropriate distance in all directions utilizing iris scissors. Following this, the designed flap was carried over into the primary defect and sutured into place. Bi-Rhombic Flap Text: The defect edges were debeveled with a #15 scalpel blade. Given the location of the defect and the proximity to free margins a bi-rhombic flap was deemed most appropriate. Using a sterile surgical marker, an appropriate rhombic flap was drawn incorporating the defect. The area thus outlined was incised deep to adipose tissue with a #15 scalpel blade. The skin margins were undermined to an appropriate distance in all directions utilizing iris scissors. Following this, the designed flap was carried over into the primary defect and sutured into place. Helical Rim Advancement Flap Text: The defect edges were debeveled with a #15 blade scalpel.  Given the location of the defect and the proximity to free margins (helical rim) a double helical rim advancement flap was deemed most appropriate. Using a sterile surgical marker, the appropriate advancement flaps were drawn incorporating the defect and placing the expected incisions between the helical rim and antihelix where possible.  The area thus outlined was incised through and through with a #15 scalpel blade.  With a skin hook and iris scissors, the flaps were gently and sharply undermined and freed up. Folllowing this, the designed flaps were carried over into the primary defect and sutured into place. Bilateral Helical Rim Advancement Flap Text: The defect edges were debeveled with a #15 blade scalpel.  Given the location of the defect and the proximity to free margins (helical rim) a bilateral helical rim advancement flap was deemed most appropriate. Using a sterile surgical marker, the appropriate advancement flaps were drawn incorporating the defect and placing the expected incisions between the helical rim and antihelix where possible.  The area thus outlined was incised through and through with a #15 scalpel blade.  With a skin hook and iris scissors, the flaps were gently and sharply undermined and freed up. Following this, the designed flaps were placed into the primary defect and sutured into place. Ear Star Wedge Flap Text: The defect edges were debeveled with a #15 blade scalpel.  Given the location of the defect and the proximity to free margins (helical rim) an ear star wedge flap was deemed most appropriate. Using a sterile surgical marker, the appropriate flap was drawn incorporating the defect and placing the expected incisions between the helical rim and antihelix where possible.  The area thus outlined was incised through and through with a #15 scalpel blade. Following this, the designed flap was carried over into the primary defect and sutured into place. Banner Transposition Flap Text: The defect edges were debeveled with a #15 scalpel blade. Given the location of the defect and the proximity to free margins a Banner transposition flap was deemed most appropriate. Using a sterile surgical marker, an appropriate flap was drawn around the defect. The area thus outlined was incised deep to adipose tissue with a #15 scalpel blade. The skin margins were undermined to an appropriate distance in all directions utilizing iris scissors. Following this, the designed flap was carried into the primary defect and sutured into place. Bilobed Flap Text: The defect edges were debeveled with a #15 scalpel blade. Given the location of the defect and the proximity to free margins a bilobe flap was deemed most appropriate. Using a sterile surgical marker, an appropriate bilobe flap drawn around the defect. The area thus outlined was incised deep to adipose tissue with a #15 scalpel blade. The skin margins were undermined to an appropriate distance in all directions utilizing iris scissors. Following this, the designed flap was carried over into the primary defect and sutured into place. Bilobed Transposition Flap Text: The defect edges were debeveled with a #15 scalpel blade. Given the location of the defect and the proximity to free margins a bilobed transposition flap was deemed most appropriate. Using a sterile surgical marker, an appropriate bilobe flap drawn around the defect. The area thus outlined was incised deep to adipose tissue with a #15 scalpel blade. The skin margins were undermined to an appropriate distance in all directions utilizing iris scissors. Following this, the designed flap was carried over into the primary defect and sutured into place. Trilobed Flap Text: The defect edges were debeveled with a #15 scalpel blade. Given the location of the defect and the proximity to free margins a trilobed flap was deemed most appropriate. Using a sterile surgical marker, an appropriate trilobed flap was drawn around the defect. The area thus outlined was incised deep to adipose tissue with a #15 scalpel blade. The skin margins were undermined to an appropriate distance in all directions utilizing iris scissors. Following this, the designed flap was carried into the primary defect and sutured into place. Dorsal Nasal Flap Text: The defect edges were debeveled with a #15 scalpel blade. Given the location of the defect and the proximity to free margins a dorsal nasal flap was deemed most appropriate. Using a sterile surgical marker, an appropriate dorsal nasal flap was drawn around the defect. The area thus outlined was incised deep to adipose tissue with a #15 scalpel blade. The skin margins were undermined to an appropriate distance in all directions utilizing iris scissors. Following this, the designed flap was carried into the primary defect and sutured into place. Island Pedicle Flap Text: The defect edges were debeveled with a #15 scalpel blade. Given the location of the defect, shape of the defect and the proximity to free margins an island pedicle advancement flap was deemed most appropriate. Using a sterile surgical marker, an appropriate advancement flap was drawn incorporating the defect, outlining the appropriate donor tissue and placing the expected incisions within the relaxed skin tension lines where possible. The area thus outlined was incised deep to adipose tissue with a #15 scalpel blade. The skin margins were undermined to an appropriate distance in all directions around the primary defect and laterally outward around the island pedicle utilizing iris scissors.  There was minimal undermining beneath the pedicle flap. Following this, the flap was carried over into the primary defect and sutured into place. Island Pedicle Flap With Canthal Suspension Text: The defect edges were debeveled with a #15 scalpel blade. Given the location of the defect, shape of the defect and the proximity to free margins an island pedicle advancement flap was deemed most appropriate. Using a sterile surgical marker, an appropriate advancement flap was drawn incorporating the defect, outlining the appropriate donor tissue and placing the expected incisions within the relaxed skin tension lines where possible. The area thus outlined was incised deep to adipose tissue with a #15 scalpel blade. The skin margins were undermined to an appropriate distance in all directions around the primary defect and laterally outward around the island pedicle utilizing iris scissors.  There was minimal undermining beneath the pedicle flap. A suspension suture was placed in the canthal tendon to prevent tension and prevent ectropion. Following this, the designed flap was placed into the primary defect and sutured into place. Alar Island Pedicle Flap Text: The defect edges were debeveled with a #15 scalpel blade. Given the location of the defect, shape of the defect and the proximity to the alar rim an island pedicle advancement flap was deemed most appropriate. Using a sterile surgical marker, an appropriate advancement flap was drawn incorporating the defect, outlining the appropriate donor tissue and placing the expected incisions within the nasal ala running parallel to the alar rim. The area thus outlined was incised with a #15 scalpel blade. The skin margins were undermined minimally to an appropriate distance in all directions around the primary defect and laterally outward around the island pedicle utilizing iris scissors.  There was minimal undermining beneath the pedicle flap. Following this, the designed flap was carried over into the primary defect and sutured into place. Double Island Pedicle Flap Text: The defect edges were debeveled with a #15 scalpel blade. Given the location of the defect, shape of the defect and the proximity to free margins a double island pedicle advancement flap was deemed most appropriate. Using a sterile surgical marker, an appropriate advancement flap was drawn incorporating the defect, outlining the appropriate donor tissue and placing the expected incisions within the relaxed skin tension lines where possible. The area thus outlined was incised deep to adipose tissue with a #15 scalpel blade. The skin margins were undermined to an appropriate distance in all directions around the primary defect and laterally outward around the island pedicle utilizing iris scissors.  There was minimal undermining beneath the pedicle flap. Following this, the flap was carried over into the primary defect and sutured into place. Island Pedicle Flap-Requiring Vessel Identification Text: The defect edges were debeveled with a #15 scalpel blade. Given the location of the defect, shape of the defect and the proximity to free margins an island pedicle advancement flap was deemed most appropriate. Using a sterile surgical marker, an appropriate advancement flap was drawn, based on the axial vessel mentioned above, incorporating the defect, outlining the appropriate donor tissue and placing the expected incisions within the relaxed skin tension lines where possible. The area thus outlined was incised deep to adipose tissue with a #15 scalpel blade. The skin margins were undermined to an appropriate distance in all directions around the primary defect and laterally outward around the island pedicle utilizing iris scissors.  There was minimal undermining beneath the pedicle flap. Following this, the designed flap was carried over into the primary defect and sutured into place. Keystone Flap Text: The defect edges were debeveled with a #15 scalpel blade. Given the location of the defect, shape of the defect a keystone flap was deemed most appropriate. Using a sterile surgical marker, an appropriate keystone flap was drawn incorporating the defect, outlining the appropriate donor tissue and placing the expected incisions within the relaxed skin tension lines where possible. The area thus outlined was incised deep to adipose tissue with a #15 scalpel blade. The skin margins were undermined to an appropriate distance in all directions around the primary defect and laterally outward around the flap utilizing iris scissors. Following this, the designed flap was carried into the primary defect and sutured into place. O-T Plasty Text: The defect edges were debeveled with a #15 scalpel blade. Given the location of the defect, shape of the defect and the proximity to free margins an O-T plasty was deemed most appropriate. Using a sterile surgical marker, an appropriate O-T plasty was drawn incorporating the defect and placing the expected incisions within the relaxed skin tension lines where possible. The area thus outlined was incised deep to adipose tissue with a #15 scalpel blade. The skin margins were undermined to an appropriate distance in all directions utilizing iris scissors. Following this, the designed flap was carried over into the primary defect and sutured into place. O-Z Plasty Text: The defect edges were debeveled with a #15 scalpel blade. Given the location of the defect, shape of the defect and the proximity to free margins an O-Z plasty (double transposition flap) was deemed most appropriate. Using a sterile surgical marker, the appropriate transposition flaps were drawn incorporating the defect and placing the expected incisions within the relaxed skin tension lines where possible. The area thus outlined was incised deep to adipose tissue with a #15 scalpel blade. The skin margins were undermined to an appropriate distance in all directions utilizing iris scissors. Hemostasis was achieved with electrocautery. The flaps were then transposed and carried over into place, one clockwise and the other counterclockwise, and anchored with interrupted buried subcutaneous sutures. Double O-Z Plasty Text: The defect edges were debeveled with a #15 scalpel blade. Given the location of the defect, shape of the defect and the proximity to free margins a Double O-Z plasty (double transposition flap) was deemed most appropriate. Using a sterile surgical marker, the appropriate transposition flaps were drawn incorporating the defect and placing the expected incisions within the relaxed skin tension lines where possible. The area thus outlined was incised deep to adipose tissue with a #15 scalpel blade. The skin margins were undermined to an appropriate distance in all directions utilizing iris scissors. Hemostasis was achieved with electrocautery. The flaps were then transposed and carried over into place, one clockwise and the other counterclockwise, and anchored with interrupted buried subcutaneous sutures. V-Y Plasty Text: The defect edges were debeveled with a #15 scalpel blade. Given the location of the defect, shape of the defect and the proximity to free margins an V-Y advancement flap was deemed most appropriate. Using a sterile surgical marker, an appropriate advancement flap was drawn incorporating the defect and placing the expected incisions within the relaxed skin tension lines where possible. The area thus outlined was incised deep to adipose tissue with a #15 scalpel blade. The skin margins were undermined to an appropriate distance in all directions utilizing iris scissors. Following this, the designed flap was advanced and carried over into the primary defect and sutured into place. H Plasty Text: Given the location of the defect, shape of the defect and the proximity to free margins a H-plasty was deemed most appropriate for repair. Using a sterile surgical marker, the appropriate advancement arms of the H-plasty were drawn incorporating the defect and placing the expected incisions within the relaxed skin tension lines where possible. The area thus outlined was incised deep to adipose tissue with a #15 scalpel blade. The skin margins were undermined to an appropriate distance in all directions utilizing iris scissors.  The opposing advancement arms were then advanced and carried over into place in opposite direction and anchored with interrupted buried subcutaneous sutures. W Plasty Text: The lesion was extirpated to the level of the fat with a #15 scalpel blade. Given the location of the defect, shape of the defect and the proximity to free margins a W-plasty was deemed most appropriate for repair. Using a sterile surgical marker, the appropriate transposition arms of the W-plasty were drawn incorporating the defect and placing the expected incisions within the relaxed skin tension lines where possible. The area thus outlined was incised deep to adipose tissue with a #15 scalpel blade. The skin margins were undermined to an appropriate distance in all directions utilizing iris scissors. The opposing transposition arms were then transposed and carried over into place in opposite direction and anchored with interrupted buried subcutaneous sutures. Z Plasty Text: The lesion was extirpated to the level of the fat with a #15 scalpel blade. Given the location of the defect, shape of the defect and the proximity to free margins a Z-plasty was deemed most appropriate for repair. Using a sterile surgical marker, the appropriate transposition arms of the Z-plasty were drawn incorporating the defect and placing the expected incisions within the relaxed skin tension lines where possible. The area thus outlined was incised deep to adipose tissue with a #15 scalpel blade. The skin margins were undermined to an appropriate distance in all directions utilizing iris scissors. The opposing transposition arms were then transposed and carried over into place in opposite direction and anchored with interrupted buried subcutaneous sutures. Double Z Plasty Text: The lesion was extirpated to the level of the fat with a #15 scalpel blade. Given the location of the defect, shape of the defect and the proximity to free margins a double Z-plasty was deemed most appropriate for repair. Using a sterile surgical marker, the appropriate transposition arms of the double Z-plasty were drawn incorporating the defect and placing the expected incisions within the relaxed skin tension lines where possible. The area thus outlined was incised deep to adipose tissue with a #15 scalpel blade. The skin margins were undermined to an appropriate distance in all directions utilizing iris scissors. The opposing transposition arms were then transposed and carried over into place in opposite direction and anchored with interrupted buried subcutaneous sutures. Zygomaticofacial Flap Text: Given the location of the defect, shape of the defect and the proximity to free margins a zygomaticofacial flap was deemed most appropriate for repair. Using a sterile surgical marker, the appropriate flap was drawn incorporating the defect and placing the expected incisions within the relaxed skin tension lines where possible. The area thus outlined was incised deep to adipose tissue with a #15 scalpel blade with preservation of a vascular pedicle.  The skin margins were undermined to an appropriate distance in all directions utilizing iris scissors. The flap was then carried over into the defect and anchored with interrupted buried subcutaneous sutures. Cheek Interpolation Flap Text: A decision was made to reconstruct the defect utilizing an interpolation axial flap and a staged reconstruction.  A telfa template was made of the defect.  This telfa template was then used to outline the Cheek Interpolation flap.  The donor area for the pedicle flap was then injected with anesthesia.  The flap was excised through the skin and subcutaneous tissue down to the layer of the underlying musculature.  The interpolation flap was carefully excised within this deep plane to maintain its blood supply.  The edges of the donor site were undermined.   The donor site was closed in a primary fashion.  The pedicle was then rotated into position and sutured.  Once the tube was sutured into place, adequate blood supply was confirmed with blanching and refill.  The pedicle was then wrapped with xeroform gauze and dressed appropriately with a telfa and gauze bandage to ensure continued blood supply and protect the attached pedicle. Cheek-To-Nose Interpolation Flap Text: A decision was made to reconstruct the defect utilizing an interpolation axial flap and a staged reconstruction.  A telfa template was made of the defect.  This telfa template was then used to outline the Cheek-To-Nose Interpolation flap.  The donor area for the pedicle flap was then injected with anesthesia.  The flap was excised through the skin and subcutaneous tissue down to the layer of the underlying musculature.  The interpolation flap was carefully excised within this deep plane to maintain its blood supply.  The edges of the donor site were undermined.   The donor site was closed in a primary fashion.  The pedicle was then rotated into position and sutured.  Once the tube was sutured into place, adequate blood supply was confirmed with blanching and refill.  The pedicle was then wrapped with xeroform gauze and dressed appropriately with a telfa and gauze bandage to ensure continued blood supply and protect the attached pedicle. Interpolation Flap Text: A decision was made to reconstruct the defect utilizing an interpolation axial flap and a staged reconstruction.  A telfa template was made of the defect.  This telfa template was then used to outline the interpolation flap.  The donor area for the pedicle flap was then injected with anesthesia.  The flap was excised through the skin and subcutaneous tissue down to the layer of the underlying musculature.  The interpolation flap was carefully excised within this deep plane to maintain its blood supply.  The edges of the donor site were undermined.   The donor site was closed in a primary fashion.  The pedicle was then rotated into position and sutured.  Once the tube was sutured into place, adequate blood supply was confirmed with blanching and refill.  The pedicle was then wrapped with xeroform gauze and dressed appropriately with a telfa and gauze bandage to ensure continued blood supply and protect the attached pedicle. Melolabial Interpolation Flap Text: A decision was made to reconstruct the defect utilizing an interpolation axial flap and a staged reconstruction.  A telfa template was made of the defect.  This telfa template was then used to outline the melolabial interpolation flap.  The donor area for the pedicle flap was then injected with anesthesia.  The flap was excised through the skin and subcutaneous tissue down to the layer of the underlying musculature.  The pedicle flap was carefully excised within this deep plane to maintain its blood supply.  The edges of the donor site were undermined.   The donor site was closed in a primary fashion.  The pedicle was then rotated into position and sutured.  Once the tube was sutured into place, adequate blood supply was confirmed with blanching and refill.  The pedicle was then wrapped with xeroform gauze and dressed appropriately with a telfa and gauze bandage to ensure continued blood supply and protect the attached pedicle. Mastoid Interpolation Flap Text: A decision was made to reconstruct the defect utilizing an interpolation axial flap and a staged reconstruction.  A telfa template was made of the defect.  This telfa template was then used to outline the mastoid interpolation flap.  The donor area for the pedicle flap was then injected with anesthesia.  The flap was excised through the skin and subcutaneous tissue down to the layer of the underlying musculature.  The pedicle flap was carefully excised within this deep plane to maintain its blood supply.  The edges of the donor site were undermined.   The donor site was closed in a primary fashion.  The pedicle was then rotated into position and sutured.  Once the tube was sutured into place, adequate blood supply was confirmed with blanching and refill.  The pedicle was then wrapped with xeroform gauze and dressed appropriately with a telfa and gauze bandage to ensure continued blood supply and protect the attached pedicle. Posterior Auricular Interpolation Flap Text: A decision was made to reconstruct the defect utilizing an interpolation axial flap and a staged reconstruction.  A telfa template was made of the defect.  This telfa template was then used to outline the posterior auricular interpolation flap.  The donor area for the pedicle flap was then injected with anesthesia.  The flap was excised through the skin and subcutaneous tissue down to the layer of the underlying musculature.  The pedicle flap was carefully excised within this deep plane to maintain its blood supply.  The edges of the donor site were undermined.   The donor site was closed in a primary fashion.  The pedicle was then rotated into position and sutured.  Once the tube was sutured into place, adequate blood supply was confirmed with blanching and refill.  The pedicle was then wrapped with xeroform gauze and dressed appropriately with a telfa and gauze bandage to ensure continued blood supply and protect the attached pedicle. Paramedian Forehead Flap Text: A decision was made to reconstruct the defect utilizing an interpolation axial flap and a staged reconstruction.  A telfa template was made of the defect.  This telfa template was then used to outline the paramedian forehead pedicle flap.  The donor area for the pedicle flap was then injected with anesthesia.  The flap was excised through the skin and subcutaneous tissue down to the layer of the underlying musculature.  The pedicle flap was carefully excised within this deep plane to maintain its blood supply.  The edges of the donor site were undermined.   The donor site was closed in a primary fashion.  The pedicle was then rotated into position and sutured.  Once the tube was sutured into place, adequate blood supply was confirmed with blanching and refill.  The pedicle was then wrapped with xeroform gauze and dressed appropriately with a telfa and gauze bandage to ensure continued blood supply and protect the attached pedicle. Abbe Flap (Upper To Lower Lip) Text: The defect of the lower lip was assessed and measured.  Given the location and size of the defect, an Abbe flap was deemed most appropriate. Using a sterile surgical marker, an appropriate Abbe flap was measured and drawn on the upper lip. Local anesthesia was then infiltrated.  A scalpel was then used to incise the upper lip through and through the skin, vermilion, muscle and mucosa, leaving the flap pedicled on the opposite side.  The flap was then rotated and transferred to the lower lip defect.  The flap was then sutured into place with a three layer technique, closing the orbicularis oris muscle layer with subcutaneous buried sutures, followed by a mucosal layer and an epidermal layer. Abbe Flap (Lower To Upper Lip) Text: The defect of the upper lip was assessed and measured.  Given the location and size of the defect, an Abbe flap was deemed most appropriate. Using a sterile surgical marker, an appropriate Abbe flap was measured and drawn on the lower lip. Local anesthesia was then infiltrated. A scalpel was then used to incise the upper lip through and through the skin, vermilion, muscle and mucosa, leaving the flap pedicled on the opposite side.  The flap was then rotated and transferred to the lower lip defect.  The flap was then sutured into place with a three layer technique, closing the orbicularis oris muscle layer with subcutaneous buried sutures, followed by a mucosal layer and an epidermal layer. Estlander Flap (Upper To Lower Lip) Text: The defect of the lower lip was assessed and measured.  Given the location and size of the defect, an Estlander flap was deemed most appropriate. Using a sterile surgical marker, an appropriate Estlander flap was measured and drawn on the upper lip. Local anesthesia was then infiltrated. A scalpel was then used to incise the lateral aspect of the flap, through skin, muscle and mucosa, leaving the flap pedicled medially.  The flap was then rotated and positioned to fill the lower lip defect.  The flap was then sutured into place with a three layer technique, closing the orbicularis oris muscle layer with subcutaneous buried sutures, followed by a mucosal layer and an epidermal layer. Cheiloplasty (Less Than 50%) Text: A decision was made to reconstruct the defect with a  cheiloplasty.  The defect was undermined extensively.  Additional orbicularis oris muscle was excised with a 15 blade scalpel.  The defect was converted into a full thickness wedge, of less than 50% of the vertical height of the lip, to facilite a better cosmetic result.  Small vessels were then tied off with 5-0 monocyrl. The orbicularis oris, superficial fascia, adipose and dermis were then reapproximated.  After the deeper layers were approximated the epidermis was reapproximated with particular care given to realign the vermilion border. Cheiloplasty (Complex) Text: A decision was made to reconstruct the defect with a  cheiloplasty.  The defect was undermined extensively.  Additional orbicularis oris muscle was excised with a 15 blade scalpel.  The defect was converted into a full thickness wedge to facilite a better cosmetic result.  Small vessels were then tied off with 5-0 monocyrl. The orbicularis oris, superficial fascia, adipose and dermis were then reapproximated.  After the deeper layers were approximated the epidermis was reapproximated with particular care given to realign the vermilion border. Ear Wedge Repair Text: A wedge excision was completed by carrying down an excision through the full thickness of the ear and cartilage with an inward facing Burow's triangle. The wound was then closed in a layered fashion. Full Thickness Lip Wedge Repair (Flap) Text: Given the location of the defect and the proximity to free margins a full thickness wedge repair was deemed most appropriate. Using a sterile surgical marker, the appropriate repair was drawn incorporating the defect and placing the expected incisions perpendicular to the vermilion border.  The vermilion border was also meticulously outlined to ensure appropriate reapproximation during the repair.  The area thus outlined was incised through and through with a #15 scalpel blade.  The muscularis and dermis were reaproximated with deep sutures following hemostasis. Care was taken to realign the vermilion border before proceeding with the superficial closure.  Once the vermilion was realigned the superfical and mucosal closure was finished. Ftsg Text: The defect edges were debeveled with a #15 scalpel blade. Given the location of the defect, shape of the defect and the proximity to free margins a full thickness skin graft was deemed most appropriate. Using a sterile surgical marker, the primary defect shape was transferred to the donor site. The area thus outlined was incised deep to adipose tissue with a #15 scalpel blade.  The harvested graft was then trimmed of adipose tissue until only dermis and epidermis was left.  The skin margins of the secondary defect were undermined to an appropriate distance in all directions utilizing iris scissors.  The secondary defect was closed with interrupted buried subcutaneous sutures.  The skin edges were then re-apposed with running  sutures.  The skin graft was then placed in the primary defect and oriented appropriately. Split-Thickness Skin Graft Text: The defect edges were debeveled with a #15 scalpel blade. Given the location of the defect, shape of the defect and the proximity to free margins a split thickness skin graft was deemed most appropriate. Using a sterile surgical marker, the primary defect shape was transferred to the donor site. The split thickness graft was then harvested.  The skin graft was then placed in the primary defect and oriented appropriately. Pinch Graft Text: The defect edges were debeveled with a #15 scalpel blade. Given the location of the defect, shape of the defect and the proximity to free margins a pinch graft was deemed most appropriate. Using a sterile surgical marker, the primary defect shape was transferred to the donor site. The area thus outlined was incised deep to adipose tissue with a #15 scalpel blade.  The harvested graft was then trimmed of adipose tissue until only dermis and epidermis was left. The skin margins of the secondary defect were undermined to an appropriate distance in all directions utilizing iris scissors.  The secondary defect was closed with interrupted buried subcutaneous sutures.  The skin edges were then re-apposed with running  sutures.  The skin graft was then placed in the primary defect and oriented appropriately. Burow's Graft Text: The defect edges were debeveled with a #15 scalpel blade. Given the location of the defect, shape of the defect, the proximity to free margins and the presence of a standing cone deformity a Burow's skin graft was deemed most appropriate. The standing cone was removed and this tissue was then trimmed to the shape of the primary defect. The adipose tissue was also removed until only dermis and epidermis were left.  The skin margins of the secondary defect were undermined to an appropriate distance in all directions utilizing iris scissors.  The secondary defect was closed with interrupted buried subcutaneous sutures.  The skin edges were then re-apposed with running  sutures.  The skin graft was then placed in the primary defect and oriented appropriately. Cartilage Graft Text: The defect edges were debeveled with a #15 scalpel blade. Given the location of the defect, shape of the defect, the fact the defect involved a full thickness cartilage defect a cartilage graft was deemed most appropriate.  An appropriate donor site was identified, cleansed, and anesthetized. The cartilage graft was then harvested and transferred to the recipient site, oriented appropriately and then sutured into place.  The secondary defect was then repaired using a primary closure. Composite Graft Text: The defect edges were debeveled with a #15 scalpel blade. Given the location of the defect, shape of the defect, the proximity to free margins and the fact the defect was full thickness a composite graft was deemed most appropriate.  The defect was outline and then transferred to the donor site.  A full thickness graft was then excised from the donor site. The graft was then placed in the primary defect, oriented appropriately and then sutured into place.  The secondary defect was then repaired using a primary closure. Epidermal Autograft Text: The defect edges were debeveled with a #15 scalpel blade. Given the location of the defect, shape of the defect and the proximity to free margins an epidermal autograft was deemed most appropriate. Using a sterile surgical marker, the primary defect shape was transferred to the donor site. The epidermal graft was then harvested.  The skin graft was then placed in the primary defect and oriented appropriately. Dermal Autograft Text: The defect edges were debeveled with a #15 scalpel blade. Given the location of the defect, shape of the defect and the proximity to free margins a dermal autograft was deemed most appropriate. Using a sterile surgical marker, the primary defect shape was transferred to the donor site. The area thus outlined was incised deep to adipose tissue with a #15 scalpel blade.  The harvested graft was then trimmed of adipose and epidermal tissue until only dermis was left.  The skin graft was then placed in the primary defect and oriented appropriately. Skin Substitute Text: The defect edges were debeveled with a #15 scalpel blade. Given the location of the defect, shape of the defect and the proximity to free margins a skin substitute graft was deemed most appropriate.  The graft material was trimmed to fit the size of the defect. The graft was then placed in the primary defect and oriented appropriately. Skin Substitute Paste Text: The defect edges were debeveled with a #15 scalpel blade. Given the location of the defect, shape of the defect and the proximity to free margins a skin substitute micronized graft was deemed most appropriate.  The entire vial contents were admixed with 0.5ccs of sterile saline, formed into a paste and then evenly spread over the entire wound bed. Skin Substitute Injection Text: The defect edges were debeveled with a #15 scalpel blade. Given the location of the defect, shape of the defect and the proximity to free margins a skin substitute micronized graft was deemed most appropriate.  The entire vial contents were admixed with 3.0ccs of sterile saline and then injected subcutaneously throughout the entire wound bed. Tissue Cultured Epidermal Autograft Text: The defect edges were debeveled with a #15 scalpel blade. Given the location of the defect, shape of the defect and the proximity to free margins a tissue cultured epidermal autograft was deemed most appropriate.  The graft was then trimmed to fit the size of the defect.  The graft was then placed in the primary defect and oriented appropriately. Xenograft Text: The defect edges were debeveled with a #15 scalpel blade. Given the location of the defect, shape of the defect and the proximity to free margins a xenograft was deemed most appropriate.  The graft was then trimmed to fit the size of the defect.  The graft was then placed in the primary defect and oriented appropriately. Purse String (Simple) Text: Given the location of the defect and the characteristics of the surrounding skin a purse string closure was deemed most appropriate.  Undermining was performed circumferentially around the surgical defect.  A purse string suture was then placed and tightened. Purse String (Intermediate) Text: Given the location of the defect and the characteristics of the surrounding skin a purse string intermediate closure was deemed most appropriate.  Undermining was performed circumferentially around the surgical defect.  A purse string suture was then placed and tightened. Partial Purse String (Simple) Text: Given the location of the defect and the characteristics of the surrounding skin a simple purse string closure was deemed most appropriate.  Undermining was performed circumferentially around the surgical defect.  A purse string suture was then placed and tightened. Wound tension only allowed a partial closure of the circular defect. Partial Purse String (Intermediate) Text: Given the location of the defect and the characteristics of the surrounding skin an intermediate purse string closure was deemed most appropriate.  Undermining was performed circumferentially around the surgical defect.  A purse string suture was then placed and tightened. Wound tension only allowed a partial closure of the circular defect. Localized Dermabrasion With Wire Brush Text: The patient was draped in routine manner.  Localized dermabrasion using 3 x 17 mm wire brush was performed in routine manner to papillary dermis. This spot dermabrasion is being performed to complete skin cancer reconstruction. It also will eliminate the other sun damaged precancerous cells that are known to be part of the regional effect of a lifetime's worth of sun exposure. This localized dermabrasion is therapeutic and should not be considered cosmetic in any regard. Localized Dermabrasion With Sand Papertext: The patient was draped in routine manner.  Localized dermabrasion using sterile sand paper was performed in routine manner to papillary dermis. This spot dermabrasion is being performed to complete skin cancer reconstruction. It also will eliminate the other sun damaged precancerous cells that are known to be part of the regional effect of a lifetime's worth of sun exposure. This localized dermabrasion is therapeutic and should not be considered cosmetic in any regard. Localized Dermabrasion With 15 Blade Text: The patient was draped in routine manner.  Localized dermabrasion using a 15 blade was performed in routine manner to papillary dermis. This spot dermabrasion is being performed to complete skin cancer reconstruction. It also will eliminate the other sun damaged precancerous cells that are known to be part of the regional effect of a lifetime's worth of sun exposure. This localized dermabrasion is therapeutic and should not be considered cosmetic in any regard. Tarsorrhaphy Text: A tarsorrhaphy was performed using Frost sutures. Intermediate Repair And Flap Additional Text (Will Appearing After The Standard Complex Repair Text): The intermediate repair was not sufficient to completely close the primary defect. The remaining additional defect was repaired with the flap mentioned below. Intermediate Repair And Graft Additional Text (Will Appearing After The Standard Complex Repair Text): The intermediate repair was not sufficient to completely close the primary defect. The remaining additional defect was repaired with the graft mentioned below. Complex Repair And Flap Additional Text (Will Appearing After The Standard Complex Repair Text): The complex repair was not sufficient to completely close the primary defect. The remaining additional defect was repaired with the flap mentioned below. Complex Repair And Graft Additional Text (Will Appearing After The Standard Complex Repair Text): The complex repair was not sufficient to completely close the primary defect. The remaining additional defect was repaired with the graft mentioned below. Eyelid Full Thickness Repair - 04454: The eyelid defect was full thickness which required a wedge repair of the eyelid. Special care was taken to ensure that the eyelid margin was realligned when placing sutures. Eyelid Partial Thickness Repair - 77100: The eyelid defect was partial thickness which required a wedge repair of the eyelid. Special care was taken to ensure that the eyelid margin was realligned when placing sutures. Cheek Interpolation Flap Division And Inset Text: Division and inset of the cheek interpolation flap was performed to achieve optimal aesthetic result, restore normal anatomic appearance and avoid distortion of normal anatomy, expedite and facilitate wound healing, achieve optimal functional result and because linear closure either not possible or would produce suboptimal result. The patient was prepped and draped in the usual manner. The pedicle was infiltrated with local anesthesia. The pedicle was sectioned with a #15 blade. The pedicle was de-bulked and trimmed to match the shape of the defect. Hemostasis was achieved. The flap donor site and free margin of the flap were secured with deep buried sutures and the wound edges were re-approximated. Cheek To Nose Interpolation Flap Division And Inset Text: Division and inset of the cheek to nose interpolation flap was performed to achieve optimal aesthetic result, restore normal anatomic appearance and avoid distortion of normal anatomy, expedite and facilitate wound healing, achieve optimal functional result and because linear closure either not possible or would produce suboptimal result. The patient was prepped and draped in the usual manner. The pedicle was infiltrated with local anesthesia. The pedicle was sectioned with a #15 blade. The pedicle was de-bulked and trimmed to match the shape of the defect. Hemostasis was achieved. The flap donor site and free margin of the flap were secured with deep buried sutures and the wound edges were re-approximated. Melolabial Interpolation Flap Division And Inset Text: Division and inset of the melolabial interpolation flap was performed to achieve optimal aesthetic result, restore normal anatomic appearance and avoid distortion of normal anatomy, expedite and facilitate wound healing, achieve optimal functional result and because linear closure either not possible or would produce suboptimal result. The patient was prepped and draped in the usual manner. The pedicle was infiltrated with local anesthesia. The pedicle was sectioned with a #15 blade. The pedicle was de-bulked and trimmed to match the shape of the defect. Hemostasis was achieved. The flap donor site and free margin of the flap were secured with deep buried sutures and the wound edges were re-approximated. Mastoid Interpolation Flap Division And Inset Text: Division and inset of the mastoid interpolation flap was performed to achieve optimal aesthetic result, restore normal anatomic appearance and avoid distortion of normal anatomy, expedite and facilitate wound healing, achieve optimal functional result and because linear closure either not possible or would produce suboptimal result. The patient was prepped and draped in the usual manner. The pedicle was infiltrated with local anesthesia. The pedicle was sectioned with a #15 blade. The pedicle was de-bulked and trimmed to match the shape of the defect. Hemostasis was achieved. The flap donor site and free margin of the flap were secured with deep buried sutures and the wound edges were re-approximated. Paramedian Forehead Flap Division And Inset Text: Division and inset of the paramedian forehead flap was performed to achieve optimal aesthetic result, restore normal anatomic appearance and avoid distortion of normal anatomy, expedite and facilitate wound healing, achieve optimal functional result and because linear closure either not possible or would produce suboptimal result. The patient was prepped and draped in the usual manner. The pedicle was infiltrated with local anesthesia. The pedicle was sectioned with a #15 blade. The pedicle was de-bulked and trimmed to match the shape of the defect. Hemostasis was achieved. The flap donor site and free margin of the flap were secured with deep buried sutures and the wound edges were re-approximated. Posterior Auricular Interpolation Flap Division And Inset Text: Division and inset of the posterior auricular interpolation flap was performed to achieve optimal aesthetic result, restore normal anatomic appearance and avoid distortion of normal anatomy, expedite and facilitate wound healing, achieve optimal functional result and because linear closure either not possible or would produce suboptimal result. The patient was prepped and draped in the usual manner. The pedicle was infiltrated with local anesthesia. The pedicle was sectioned with a #15 blade. The pedicle was de-bulked and trimmed to match the shape of the defect. Hemostasis was achieved. The flap donor site and free margin of the flap were secured with deep buried sutures and the wound edges were re-approximated. Manual Repair Warning Statement: We plan on removing the manually selected variable below in favor of our much easier automatic structured text blocks found in the previous tab. We decided to do this to help make the flow better and give you the full power of structured data. Manual selection is never going to be ideal in our platform and I would encourage you to avoid using manual selection from this point on, especially since I will be sunsetting this feature. It is important that you do one of two things with the customized text below. First, you can save all of the text in a word file so you can have it for future reference. Second, transfer the text to the appropriate area in the Library tab. Lastly, if there is a flap or graft type which we do not have you need to let us know right away so I can add it in before the variable is hidden. No need to panic, we plan to give you roughly 6 months to make the change. Consent: The rationale for the repair was explained to the patient and consent was obtained. The risks, benefits and alternatives to therapy were discussed in detail. Specifically, the risks of infection, scarring, bleeding, prolonged wound healing, incomplete removal, allergy to anesthesia, nerve injury and recurrence were addressed. Prior to the procedure, the treatment site was clearly identified and confirmed by the patient. All components of Universal Protocol/PAUSE Rule completed. Post-Care Instructions: I reviewed with the patient in detail post-care instructions. Patient is not to engage in any heavy lifting, exercise, or swimming for the next 14 days. Should the patient develop any fevers, chills, bleeding, severe pain patient will contact the office immediately. Pain Refusal Text: I offered to prescribe pain medication but the patient refused to take this medication. Where Do You Want The Question To Include Opioid Counseling Located?: Case Summary Tab Information: Selecting Yes will display possible errors in your note based on the variables you have selected. This validation is only offered as a suggestion for you. PLEASE NOTE THAT THE VALIDATION TEXT WILL BE REMOVED WHEN YOU FINALIZE YOUR NOTE. IF YOU WANT TO FAX A PRELIMINARY NOTE YOU WILL NEED TO TOGGLE THIS TO 'NO' IF YOU DO NOT WANT IT IN YOUR FAXED NOTE.

## 2024-05-31 ENCOUNTER — HOSPITAL ENCOUNTER (OUTPATIENT)
Dept: RADIOLOGY | Facility: CLINIC | Age: 79
Discharge: HOME | End: 2024-05-31
Payer: MEDICARE

## 2024-05-31 ENCOUNTER — OFFICE VISIT (OUTPATIENT)
Dept: ORTHOPEDIC SURGERY | Facility: CLINIC | Age: 79
End: 2024-05-31
Payer: MEDICARE

## 2024-05-31 ENCOUNTER — TELEPHONE (OUTPATIENT)
Dept: PRIMARY CARE | Facility: CLINIC | Age: 79
End: 2024-05-31

## 2024-05-31 VITALS — BODY MASS INDEX: 27.39 KG/M2 | WEIGHT: 164.4 LBS | HEIGHT: 65 IN

## 2024-05-31 DIAGNOSIS — E78.2 MIXED HYPERLIPIDEMIA: Primary | ICD-10-CM

## 2024-05-31 DIAGNOSIS — R52 PAIN: ICD-10-CM

## 2024-05-31 DIAGNOSIS — M25.561 CHRONIC PAIN OF RIGHT KNEE: Primary | ICD-10-CM

## 2024-05-31 DIAGNOSIS — Z96.651 S/P TOTAL KNEE REPLACEMENT, RIGHT: ICD-10-CM

## 2024-05-31 DIAGNOSIS — G89.29 CHRONIC PAIN OF RIGHT KNEE: Primary | ICD-10-CM

## 2024-05-31 PROCEDURE — 1036F TOBACCO NON-USER: CPT | Performed by: ORTHOPAEDIC SURGERY

## 2024-05-31 PROCEDURE — 1160F RVW MEDS BY RX/DR IN RCRD: CPT | Performed by: ORTHOPAEDIC SURGERY

## 2024-05-31 PROCEDURE — 73502 X-RAY EXAM HIP UNI 2-3 VIEWS: CPT | Mod: RT

## 2024-05-31 PROCEDURE — 99024 POSTOP FOLLOW-UP VISIT: CPT | Performed by: ORTHOPAEDIC SURGERY

## 2024-05-31 PROCEDURE — 73560 X-RAY EXAM OF KNEE 1 OR 2: CPT | Mod: RIGHT SIDE | Performed by: ORTHOPAEDIC SURGERY

## 2024-05-31 PROCEDURE — 1159F MED LIST DOCD IN RCRD: CPT | Performed by: ORTHOPAEDIC SURGERY

## 2024-05-31 PROCEDURE — 73560 X-RAY EXAM OF KNEE 1 OR 2: CPT | Mod: RT

## 2024-05-31 PROCEDURE — 1125F AMNT PAIN NOTED PAIN PRSNT: CPT | Performed by: ORTHOPAEDIC SURGERY

## 2024-05-31 ASSESSMENT — ENCOUNTER SYMPTOMS
LOSS OF SENSATION IN FEET: 0
DEPRESSION: 0
OCCASIONAL FEELINGS OF UNSTEADINESS: 0

## 2024-05-31 ASSESSMENT — PAIN SCALES - GENERAL: PAINLEVEL_OUTOF10: 8

## 2024-05-31 ASSESSMENT — PAIN - FUNCTIONAL ASSESSMENT: PAIN_FUNCTIONAL_ASSESSMENT: 0-10

## 2024-05-31 NOTE — PATIENT INSTRUCTIONS
Continue to rest, ice and elevate your knee.  Continue your pain regimen.   We will give a a referral to Physical therapy.   Remember to call our office for antibiotics before dental work.   Use a cane or walker for support.   Do not kneel, twist, or squat and avoid heavy lifting.   Return in 4 weeks or sooner as needed.

## 2024-05-31 NOTE — PROGRESS NOTES
Subjective      Past Surgical History:   Procedure Laterality Date    APPENDECTOMY      BI STEREOTACTIC GUIDED BREAST RIGHT LOCALIZATION AND BIOPSY Right 09/24/2014    BI STEREOTACTIC GUIDED BREAST LOCALIZATION AND BIOPSY RIGHT [facility]    BI STEREOTACTIC GUIDED BREAST RIGHT LOCALIZATION AND BIOPSY Right 11/27/2017    BI STEREOTACTIC GUIDED BREAST LOCALIZATION AND BIOPSY RIGHT [facility]    BI US GUIDED BREAST LOCALIZATION AND BIOPSY LEFT Left 12/09/2022    BI US GUIDED BREAST LOCALIZATION AND BIOPSY LEFT [facility]    CATARACT EXTRACTION W/ INTRAOCULAR LENS  IMPLANT, BILATERAL      COLON SURGERY  1992    COLONOSCOPY      HYSTERECTOMY      KNEE ARTHROSCOPY W/ MENISCECTOMY Left     And Baker's cyst removal    KNEE SURGERY Right 03/13/2024    total    WISDOM TOOTH EXTRACTION            Patient History      This patient is s/p right total knee replacement done by Dr. Guerra on 3-. She has returned home from [facility]. They present today and state that their right knee pain is 1/10. They are slowly resuming their normal activities of daily living. She is working with outpatient physical therapy. She is walking independently without support.  They deny chest pain, shortness of breath.     There were no vitals taken for this visit.     ROS  CARDIOLOGY:   Negative for chest pain, shortness of breath.   RESPIRATORY:   Negative for chest pain, shortness of breath.   MUSCULOSKELETAL:   See HPI for details.   NEUROLOGY:   Negative for tingling, numbness, weakness.      Physical exam: Right hip: There is some pain with and limitation of range of motion.  Right knee: incision is clean dry and well healed.  No evidence of infection, cellulitis, fluctuance or abscess. Patient has painless ROM from 2-110 degrees. Nontender in the right calf. She has decreased sensation to light touch at the lateral aspect of the incision. Otherwise, Neurovascular is intact. Patient is seen today walking  dependently without pain and without support and with a stable gait.     AP and lateral x-rays of the right knee done and read in office on previous office visit show a right total knee replacement in good position.  X-rays of the right hip done and read in the office today show osteoarthritis of the right hip with joint irregularity.  I reviewed the x-rays listed above with the patient in the office today.    Lower extremity venous duplex right    Result Date: 3/15/2024           [place] [id number]            Phone [phone]  Vascular Lab Report  [facility] US LOWER EXTREMITY VENOUS DUPLEX RIGHT Patient Name:      GIANCARLO FIDELINA CALZADA       Reading Physician:  [id number] Mayuri Sotelo MD, RPVI Study Date:        3/15/2024            Ordering Provider:  [id number] CHARI ZARAGOZA MRN/PID:           [id number]             Fellow: Accession#:        [id number]         Technologist:       Jazlyn Calderón RVRUPESH Date of Birth/Age: 1945 / 78 years Technologist 2: Gender:            F                    Encounter#:         [id number] Admission Status:  Inpatient            Location Performed: [facility]  Diagnosis/ICD: Localized (leg) edema-R60.0 Indication:    Limb swelling CPT Codes:     67833 Peripheral venous duplex scan for DVT Limited  Pertinent History: S/p right total knee arthroplasty 3/13/2024.  CONCLUSIONS:  Right Lower Venous: No evidence of acute deep vein thrombus visualized in the right lower extremity. Additional Findings; Heterogeneous sturcture noted in the popliteal fossa measuring approximately 2.9 x 1.5 x 0.6 cm. Findings could be consistent with complex/ruptured Baker's cyst or hematoma. No vascularity noted. Clinical correlation and imaging follow-up is recommended. Left Lower Venous: Left common femoral vein is negative for deep vein  thrombus.  Imaging & Doppler Findings:  Right                 Compressible Thrombus        Flow Distal External Iliac                None       Pulsatile CFV                       Yes        None       Pulsatile PFV                       Yes        None FV Proximal               Yes        None       Pulsatile FV Mid                    Yes        None FV Distal                 Yes        None Popliteal                 Yes        None   Spontaneous/Phasic Peroneal                  Yes        None PTV                       Yes        None  Left Compress Thrombus        Flow CFV    Yes      None   Spontaneous/Phasic  [id number] Mayuri Sotelo MD, MICHELLE Electronically signed by [id number] MICHELLE Gonsales MD on 3/15/2024 at 6:43:19 PM  ** Final **     XR knee right 1-2 views    Result Date: 3/13/2024  Interpreted By:  Kevan Lucas, STUDY: XR KNEE RIGHT 1-2 VIEWS; 3/13/2024 11:53 am   INDICATION: Signs/Symptoms:Post op knee.   COMPARISON: 01/19/2024   ACCESSION NUMBER(S): [id number]   ORDERING CLINICIAN: MAGEN DANIEL   TECHNIQUE: AP and lateral views of the right knee.   FINDINGS: Normal appearance of femoral, tibial, and retropatellar components. There are expected postoperative changes of right total knee prosthesis. Expected postoperative changes of the soft tissues. No abnormal radiopaque foreign body.       Expected postoperative changes of right total knee prosthesis.   Signed by: Kevan Lucas 3/13/2024 12:14 PM Dictation workstation:   [id number]      Diagnoses and all orders for this visit:  Chronic pain of right knee (Primary)  S/P total knee replacement, right  Options are discussed with the patient in detail. The patient is instructed to continue with home exercise with gentle strengthening and ROM exercises.  The patient is instructed regarding total knee infection precautions, activity modification and risk for further injury with falling or trauma and to use a cane for support, ice, provider  directed at home gentle strengthening and ROM exercises, and the appropriate use of Tylenol as needed for pain with its potential adverse reactions and side effects. The patient understands. Return in 6 weeks for reevaluation or sooner as needed Please note that this report has been produced using speech recognition software.  It may contain errors related to grammar, punctuation or spelling.  Electronically signed, but not reviewed.    Nic Guerra MD

## 2024-06-02 RX ORDER — SIMVASTATIN 20 MG/1
20 TABLET, FILM COATED ORAL NIGHTLY
Qty: 90 TABLET | Refills: 3 | Status: SHIPPED | OUTPATIENT
Start: 2024-06-02 | End: 2025-06-02

## 2024-06-13 ENCOUNTER — APPOINTMENT (OUTPATIENT)
Dept: ORTHOPEDIC SURGERY | Facility: CLINIC | Age: 79
End: 2024-06-13
Payer: MEDICARE

## 2024-06-18 ENCOUNTER — OFFICE VISIT (OUTPATIENT)
Dept: PRIMARY CARE | Facility: CLINIC | Age: 79
End: 2024-06-18
Payer: MEDICARE

## 2024-06-18 VITALS
BODY MASS INDEX: 26.79 KG/M2 | DIASTOLIC BLOOD PRESSURE: 58 MMHG | SYSTOLIC BLOOD PRESSURE: 122 MMHG | HEART RATE: 78 BPM | WEIGHT: 161 LBS | OXYGEN SATURATION: 97 %

## 2024-06-18 DIAGNOSIS — I10 PRIMARY HYPERTENSION: Primary | ICD-10-CM

## 2024-06-18 DIAGNOSIS — R60.0 EDEMA OF LOWER EXTREMITY: ICD-10-CM

## 2024-06-18 DIAGNOSIS — I48.20 CHRONIC ATRIAL FIBRILLATION (MULTI): ICD-10-CM

## 2024-06-18 DIAGNOSIS — M54.31 RIGHT SIDED SCIATICA: ICD-10-CM

## 2024-06-18 DIAGNOSIS — E78.2 MIXED HYPERLIPIDEMIA: ICD-10-CM

## 2024-06-18 DIAGNOSIS — G47.33 OBSTRUCTIVE SLEEP APNEA SYNDROME: ICD-10-CM

## 2024-06-18 DIAGNOSIS — M81.0 AGE-RELATED OSTEOPOROSIS WITHOUT CURRENT PATHOLOGICAL FRACTURE: ICD-10-CM

## 2024-06-18 DIAGNOSIS — E03.9 ACQUIRED HYPOTHYROIDISM: ICD-10-CM

## 2024-06-18 PROBLEM — R21 RASH: Status: RESOLVED | Noted: 2024-04-02 | Resolved: 2024-06-18

## 2024-06-18 PROBLEM — N39.0 URINARY TRACT INFECTION: Status: RESOLVED | Noted: 2022-12-07 | Resolved: 2024-06-18

## 2024-06-18 PROBLEM — R30.0 DYSURIA: Status: RESOLVED | Noted: 2024-04-02 | Resolved: 2024-06-18

## 2024-06-18 PROBLEM — B37.2 INTERTRIGINOUS CANDIDIASIS: Status: RESOLVED | Noted: 2024-04-02 | Resolved: 2024-06-18

## 2024-06-18 PROCEDURE — 1125F AMNT PAIN NOTED PAIN PRSNT: CPT | Performed by: INTERNAL MEDICINE

## 2024-06-18 PROCEDURE — 1159F MED LIST DOCD IN RCRD: CPT | Performed by: INTERNAL MEDICINE

## 2024-06-18 PROCEDURE — 1036F TOBACCO NON-USER: CPT | Performed by: INTERNAL MEDICINE

## 2024-06-18 PROCEDURE — 99214 OFFICE O/P EST MOD 30 MIN: CPT | Performed by: INTERNAL MEDICINE

## 2024-06-18 PROCEDURE — 3078F DIAST BP <80 MM HG: CPT | Performed by: INTERNAL MEDICINE

## 2024-06-18 PROCEDURE — 3074F SYST BP LT 130 MM HG: CPT | Performed by: INTERNAL MEDICINE

## 2024-06-18 RX ORDER — PREDNISONE 10 MG/1
10 TABLET ORAL DAILY
Qty: 10 TABLET | Refills: 0 | Status: SHIPPED | OUTPATIENT
Start: 2024-06-18 | End: 2024-06-28

## 2024-06-18 ASSESSMENT — ENCOUNTER SYMPTOMS
COUGH: 0
LOSS OF SENSATION IN FEET: 0
DYSURIA: 0
CONSTITUTIONAL NEGATIVE: 1
DIARRHEA: 0
FREQUENCY: 0
PSYCHIATRIC NEGATIVE: 1
DEPRESSION: 0
OCCASIONAL FEELINGS OF UNSTEADINESS: 0
CONSTIPATION: 0
SHORTNESS OF BREATH: 0
ARTHRALGIAS: 1
ACTIVITY CHANGE: 0
ABDOMINAL PAIN: 0
PALPITATIONS: 0
MYALGIAS: 1

## 2024-06-18 ASSESSMENT — PAIN SCALES - GENERAL: PAINLEVEL: 6

## 2024-06-18 NOTE — PROGRESS NOTES
Subjective   Patient ID: Jessy Mcdaniel is a 78 y.o. female who presents for 4 month follow up .    Hypertension-compliant and stable on current medications       Atrial Fibrillation--stable on meds-remains on eliquis. Managed by Dr Aguila    Hyperlipidemia-stable and compliant on meds. Taking simvastatin. Coronary calcium score 0 in 2022.Lab Results       Component                Value               Date                       LDLCALC                  46 (L)              04/25/2023               Hypothyroid-stable on meds- Lab Results       Component                Value               Date                       TSH                      3.33                02/28/2024               Sleep apnea--uses CPAP nightly with benefit    Exercise-limited by knee        Diet -eats healthy-has decreased portions, increasing fruit    R knee replacement is fantastic but having pain from above knee up side of hip into back             Review of Systems   Constitutional: Negative.  Negative for activity change.        Losing weight on purpose   Respiratory:  Negative for cough and shortness of breath.    Cardiovascular:  Positive for leg swelling (right leg below knee since surg-neg US). Negative for chest pain and palpitations.   Gastrointestinal:  Negative for abdominal pain, constipation and diarrhea.   Genitourinary:  Negative for dysuria and frequency.   Musculoskeletal:  Positive for arthralgias and myalgias.        R knee pain resolved but pain up and down leg now   Skin:  Negative for rash.   Psychiatric/Behavioral: Negative.          Planning travel       Objective   /58 (BP Location: Left arm, Patient Position: Sitting)   Pulse 78   Wt 73 kg (161 lb)   LMP  (LMP Unknown)   SpO2 97%   BMI 26.79 kg/m²     Physical Exam  Constitutional:       General: She is not in acute distress.     Appearance: Normal appearance.   Cardiovascular:      Rate and Rhythm: Normal rate and regular rhythm.      Heart sounds: Normal  heart sounds. No murmur heard.     No gallop.   Pulmonary:      Breath sounds: Normal breath sounds. No wheezing, rhonchi or rales.   Musculoskeletal:      Right lower leg: Edema (in ace wrap) present.      Left lower leg: No edema.      Comments: Able to straighten R knee fully   Skin:     General: Skin is warm and dry.      Findings: No rash.   Neurological:      General: No focal deficit present.      Mental Status: She is alert and oriented to person, place, and time.   Psychiatric:         Mood and Affect: Mood normal.         Assessment/Plan  will try steroids for sciatica  Diagnoses and all orders for this visit:  Primary hypertension  Chronic atrial fibrillation (Multi)  Mixed hyperlipidemia  Acquired hypothyroidism  Age-related osteoporosis without current pathological fracture  Obstructive sleep apnea syndrome  Edema of lower extremity  Right sided sciatica  -     predniSONE (Deltasone) 10 mg tablet; Take 1 tablet (10 mg) by mouth once daily for 10 days.  Other orders  -     Follow Up In Primary Care - Established  -     Follow Up In Primary Care - Established; Future      Current Outpatient Medications:     Eliquis 5 mg tablet, Take 1 tablet (5 mg) by mouth 2 times a day., Disp: 180 tablet, Rfl: 3    estradioL 10 mcg insert, Insert 10 mcg into the vagina 2 times a week., Disp: 30 each, Rfl: 3    flecainide (Tambocor) 50 mg tablet, Take 1 tablet (50 mg) by mouth 2 times a day., Disp: 180 tablet, Rfl: 3    furosemide (Lasix) 20 mg tablet, Take 1 tablet (20 mg) by mouth once daily. Only for swelling, Disp: , Rfl:     pantoprazole (ProtoNix) 40 mg EC tablet, Take 1 tablet (40 mg) by mouth once daily in the morning. Take before meals. Do not crush, chew, or split. Do not start before March 16, 2024., Disp: 30 tablet, Rfl: 0    simvastatin (Zocor) 20 mg tablet, Take 1 tablet (20 mg) by mouth once daily at bedtime., Disp: 90 tablet, Rfl: 3    Synthroid 50 mcg tablet, Take 1 tablet (50 mcg) by mouth once daily. 1  tab alt with 1/2 daily, Disp: 90 tablet, Rfl: 3    triamterene-hydrochlorothiazid (Maxzide-25) 37.5-25 mg tablet, Take 0.5 tablets by mouth once daily in the morning., Disp: 90 tablet, Rfl: 1    predniSONE (Deltasone) 10 mg tablet, Take 1 tablet (10 mg) by mouth once daily for 10 days., Disp: 10 tablet, Rfl: 0

## 2024-06-20 ENCOUNTER — OFFICE VISIT (OUTPATIENT)
Dept: CARDIOLOGY | Facility: CLINIC | Age: 79
End: 2024-06-20
Payer: MEDICARE

## 2024-06-20 ENCOUNTER — CLINICAL SUPPORT (OUTPATIENT)
Dept: PRIMARY CARE | Facility: CLINIC | Age: 79
End: 2024-06-20
Payer: MEDICARE

## 2024-06-20 VITALS — WEIGHT: 161 LBS | BODY MASS INDEX: 26.79 KG/M2 | SYSTOLIC BLOOD PRESSURE: 124 MMHG | DIASTOLIC BLOOD PRESSURE: 60 MMHG

## 2024-06-20 DIAGNOSIS — I48.0 PAROXYSMAL ATRIAL FIBRILLATION (MULTI): Primary | ICD-10-CM

## 2024-06-20 DIAGNOSIS — M81.0 AGE-RELATED OSTEOPOROSIS WITHOUT CURRENT PATHOLOGICAL FRACTURE: ICD-10-CM

## 2024-06-20 DIAGNOSIS — I48.91 ATRIAL FIBRILLATION (MULTI): ICD-10-CM

## 2024-06-20 PROBLEM — I48.20 CHRONIC ATRIAL FIBRILLATION (MULTI): Status: RESOLVED | Noted: 2018-10-15 | Resolved: 2024-06-20

## 2024-06-20 PROBLEM — I48.4 ATYPICAL ATRIAL FLUTTER (MULTI): Status: RESOLVED | Noted: 2023-08-25 | Resolved: 2024-06-20

## 2024-06-20 PROCEDURE — 93005 ELECTROCARDIOGRAM TRACING: CPT | Performed by: INTERNAL MEDICINE

## 2024-06-20 PROCEDURE — 99213 OFFICE O/P EST LOW 20 MIN: CPT | Performed by: INTERNAL MEDICINE

## 2024-06-20 PROCEDURE — 3074F SYST BP LT 130 MM HG: CPT | Performed by: INTERNAL MEDICINE

## 2024-06-20 PROCEDURE — 93010 ELECTROCARDIOGRAM REPORT: CPT | Performed by: INTERNAL MEDICINE

## 2024-06-20 PROCEDURE — 2500000004 HC RX 250 GENERAL PHARMACY W/ HCPCS (ALT 636 FOR OP/ED): Mod: JZ | Performed by: INTERNAL MEDICINE

## 2024-06-20 PROCEDURE — 1036F TOBACCO NON-USER: CPT | Performed by: INTERNAL MEDICINE

## 2024-06-20 PROCEDURE — 3078F DIAST BP <80 MM HG: CPT | Performed by: INTERNAL MEDICINE

## 2024-06-20 PROCEDURE — 1159F MED LIST DOCD IN RCRD: CPT | Performed by: INTERNAL MEDICINE

## 2024-06-20 PROCEDURE — 1157F ADVNC CARE PLAN IN RCRD: CPT | Performed by: INTERNAL MEDICINE

## 2024-06-20 PROCEDURE — 96372 THER/PROPH/DIAG INJ SC/IM: CPT | Performed by: INTERNAL MEDICINE

## 2024-06-20 ASSESSMENT — COLUMBIA-SUICIDE SEVERITY RATING SCALE - C-SSRS
2. HAVE YOU ACTUALLY HAD ANY THOUGHTS OF KILLING YOURSELF?: NO
6. HAVE YOU EVER DONE ANYTHING, STARTED TO DO ANYTHING, OR PREPARED TO DO ANYTHING TO END YOUR LIFE?: NO
1. IN THE PAST MONTH, HAVE YOU WISHED YOU WERE DEAD OR WISHED YOU COULD GO TO SLEEP AND NOT WAKE UP?: NO

## 2024-06-20 ASSESSMENT — ENCOUNTER SYMPTOMS
OCCASIONAL FEELINGS OF UNSTEADINESS: 0
LOSS OF SENSATION IN FEET: 0
DEPRESSION: 0

## 2024-06-20 NOTE — ASSESSMENT & PLAN NOTE
History as above.  The patient is doing quite well and is post catheter-based therapy on very low doses of flecainide with no recurrences.  I think be reasonable to discontinue her flecainide at this juncture.  I would continue the Eliquis however given the propensity to have recurrence.

## 2024-06-20 NOTE — PROGRESS NOTES
No chief complaint on file.           Patient is well-known to me.  She is a 78-year-old female with a history of paroxysmal atrial fibrillation post catheter-based therapy in 2019 and recently maintained on amiodarone but that was discontinued secondary to optimize logical complications.  She is currently on flecainide and see me in follow-up.  She has had preserved LV systolic function and a coronary calcium CT score of 0.  The patient recently underwent right knee replacement and has recuperated quite nicely.  Happily she has not had any atrial fibrillation whatsoever on low doses of flecainide.       Active Ambulatory Problems     Diagnosis Date Noted   • Atypical atrial flutter (Multi) 08/25/2023   • Daytime somnolence 08/25/2023   • Primary hypertension 04/25/2023   • Hyperlipidemia 08/25/2023   • Hypothyroidism 08/25/2023   • Obstructive sleep apnea syndrome 08/25/2023   • Osteoporosis 08/25/2023   • PAC (premature atrial contraction) 08/25/2023   • Atrophic vaginitis 08/25/2023   • Tear of medial meniscus of knee 02/09/2009   • Tear of lateral cartilage or meniscus of knee, current 02/09/2009   • Pain in joint, lower leg 07/12/2010   • Nuclear sclerotic cataract 08/07/2018   • Nuclear sclerosis 08/07/2018   • Chronic atrial fibrillation (Multi) 10/15/2018   • Right knee pain 10/26/2023   • Left knee pain 10/26/2023   • Osteoarthritis of right knee 10/26/2023   • Instability of knee joint 01/19/2024   • Chronic pain of right knee 03/13/2024   • Primary osteoarthritis of right knee 03/13/2024   • 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   • Left foot pain 08/21/2023   • S/P total knee replacement, right 04/05/2024   • Edema of lower extremity 04/15/2024     Resolved Ambulatory Problems     Diagnosis Date Noted   • Abnormal laboratory test result 08/25/2023   • Dysuria 04/02/2024   • Intertriginous candidiasis 04/02/2024   • Rash 04/02/2024   • Urinary tract infection 12/07/2022     Past Medical History:    Diagnosis Date   • Atrial fibrillation and flutter (Multi)    • Bilateral knee pain    • Colon cancer (Multi)    • Disease of thyroid gland    • GERD (gastroesophageal reflux disease)    • High cholesterol    • Hypertension    • Leg edema, right    • Osteoarthritis    • Premature atrial contraction    • Sleep apnea         Review of Systems   All other systems reviewed and are negative.       Objective     There were no vitals filed for this visit.     Vitals and nursing note reviewed.   Constitutional:       Appearance: Healthy appearance.   HENT:    Mouth/Throat:      Pharynx: Oropharynx is clear.   Pulmonary:      Effort: Pulmonary effort is normal.      Breath sounds: Normal breath sounds.   Cardiovascular:      PMI at left midclavicular line. Normal rate. Regular rhythm. Normal S1. Normal S2.       Murmurs: There is a grade 1/6 holosystolic murmur.      No gallop.  No click. No rub.   Pulses:     Intact distal pulses.   Edema:     Peripheral edema absent.   Abdominal:      General: Bowel sounds are normal.   Musculoskeletal:      Cervical back: Normal range of motion. Skin:     General: Skin is warm and dry.   Neurological:      General: No focal deficit present.      Mental Status: Alert and oriented to person, place and time.          Lab Review:   No visits with results within 2 Month(s) from this visit.   Latest known visit with results is:   Admission on 03/13/2024, Discharged on 03/17/2024   Component Date Value   • Glucose 03/14/2024 126 (H)    • Sodium 03/14/2024 136    • Potassium 03/14/2024 3.8    • Chloride 03/14/2024 101    • Bicarbonate 03/14/2024 25    • Urea Nitrogen 03/14/2024 17    • Creatinine 03/14/2024 0.90    • eGFR 03/14/2024 65    • Calcium 03/14/2024 8.1 (L)    • Anion Gap 03/14/2024 10    • WBC 03/14/2024 7.6    • nRBC 03/14/2024 0.0    • RBC 03/14/2024 2.69 (L)    • Hemoglobin 03/14/2024 8.4 (L)    • Hematocrit 03/14/2024 25.9 (L)    • MCV 03/14/2024 96    • MCH 03/14/2024 31.2    •  MCHC 03/14/2024 32.4    • RDW 03/14/2024 13.4    • Platelets 03/14/2024 226    • Neutrophils % 03/14/2024 78.7    • Immature Granulocytes %,* 03/14/2024 0.3    • Lymphocytes % 03/14/2024 9.9    • Monocytes % 03/14/2024 11.0    • Eosinophils % 03/14/2024 0.0    • Basophils % 03/14/2024 0.1    • Neutrophils Absolute 03/14/2024 5.99 (H)    • Immature Granulocytes Ab* 03/14/2024 0.02    • Lymphocytes Absolute 03/14/2024 0.75 (L)    • Monocytes Absolute 03/14/2024 0.84 (H)    • Eosinophils Absolute 03/14/2024 0.00    • Basophils Absolute 03/14/2024 0.01    • Iron 03/14/2024 120    • UIBC 03/14/2024 96 (L)    • TIBC 03/14/2024 216 (L)    • % Saturation 03/14/2024 56 (H)    • Glucose 03/15/2024 104 (H)    • Sodium 03/15/2024 140    • Potassium 03/15/2024 4.1    • Chloride 03/15/2024 105    • Bicarbonate 03/15/2024 25    • Urea Nitrogen 03/15/2024 16    • Creatinine 03/15/2024 0.90    • eGFR 03/15/2024 65    • Calcium 03/15/2024 8.2 (L)    • Anion Gap 03/15/2024 10    • WBC 03/15/2024 5.6    • nRBC 03/15/2024 0.0    • RBC 03/15/2024 2.58 (L)    • Hemoglobin 03/15/2024 7.9 (L)    • Hematocrit 03/15/2024 24.9 (L)    • MCV 03/15/2024 97    • MCH 03/15/2024 30.6    • MCHC 03/15/2024 31.7 (L)    • RDW 03/15/2024 13.8    • Platelets 03/15/2024 223    • Neutrophils % 03/15/2024 58.4    • Immature Granulocytes %,* 03/15/2024 0.4    • Lymphocytes % 03/15/2024 28.6    • Monocytes % 03/15/2024 12.4    • Eosinophils % 03/15/2024 0.0    • Basophils % 03/15/2024 0.2    • Neutrophils Absolute 03/15/2024 3.24    • Immature Granulocytes Ab* 03/15/2024 0.02    • Lymphocytes Absolute 03/15/2024 1.59    • Monocytes Absolute 03/15/2024 0.69    • Eosinophils Absolute 03/15/2024 0.00    • Basophils Absolute 03/15/2024 0.01    • Glucose 03/14/2024 124 (H)    • Sodium 03/14/2024 134    • Potassium 03/14/2024 3.8    • Chloride 03/14/2024 101    • Bicarbonate 03/14/2024 24    • Urea Nitrogen 03/14/2024 20    • Creatinine 03/14/2024 1.00    • eGFR  03/14/2024 57 (L)    • Calcium 03/14/2024 7.9 (L)    • Albumin 03/14/2024 3.2 (L)    • Alkaline Phosphatase 03/14/2024 41    • Total Protein 03/14/2024 5.3 (L)    • AST 03/14/2024 17    • Bilirubin, Total 03/14/2024 0.3    • ALT 03/14/2024 7    • Anion Gap 03/14/2024 9    • WBC 03/14/2024 6.8    • nRBC 03/14/2024 0.0    • RBC 03/14/2024 2.54 (L)    • Hemoglobin 03/14/2024 8.0 (L)    • Hematocrit 03/14/2024 24.9 (L)    • MCV 03/14/2024 98    • MCH 03/14/2024 31.5    • MCHC 03/14/2024 32.1    • RDW 03/14/2024 13.6    • Platelets 03/14/2024 234    • Neutrophils % 03/14/2024 60.5    • Immature Granulocytes %,* 03/14/2024 0.3    • Lymphocytes % 03/14/2024 26.6    • Monocytes % 03/14/2024 12.6    • Eosinophils % 03/14/2024 0.0    • Basophils % 03/14/2024 0.0    • Neutrophils Absolute 03/14/2024 4.14    • Immature Granulocytes Ab* 03/14/2024 0.02    • Lymphocytes Absolute 03/14/2024 1.82    • Monocytes Absolute 03/14/2024 0.86 (H)    • Eosinophils Absolute 03/14/2024 0.00    • Basophils Absolute 03/14/2024 0.00    • WBC 03/16/2024 5.9    • nRBC 03/16/2024 0.0    • RBC 03/16/2024 2.48 (L)    • Hemoglobin 03/16/2024 7.6 (L)    • Hematocrit 03/16/2024 24.3 (L)    • MCV 03/16/2024 98    • MCH 03/16/2024 30.6    • MCHC 03/16/2024 31.3 (L)    • RDW 03/16/2024 14.0    • Platelets 03/16/2024 212    • Neutrophils % 03/16/2024 54.5    • Immature Granulocytes %,* 03/16/2024 1.0 (H)    • Lymphocytes % 03/16/2024 34.3    • Monocytes % 03/16/2024 9.5    • Eosinophils % 03/16/2024 0.5    • Basophils % 03/16/2024 0.2    • Neutrophils Absolute 03/16/2024 3.21    • Immature Granulocytes Ab* 03/16/2024 0.06    • Lymphocytes Absolute 03/16/2024 2.02    • Monocytes Absolute 03/16/2024 0.56    • Eosinophils Absolute 03/16/2024 0.03    • Basophils Absolute 03/16/2024 0.01    • Extra Tube 03/16/2024 Hold for add-ons.    • WBC 03/17/2024 6.1    • nRBC 03/17/2024 0.0    • RBC 03/17/2024 2.54 (L)    • Hemoglobin 03/17/2024 7.9 (L)    • Hematocrit  03/17/2024 24.9 (L)    • MCV 03/17/2024 98    • MCH 03/17/2024 31.1    • MCHC 03/17/2024 31.7 (L)    • RDW 03/17/2024 14.1    • Platelets 03/17/2024 236    • Neutrophils % 03/17/2024 66.8    • Immature Granulocytes %,* 03/17/2024 1.0 (H)    • Lymphocytes % 03/17/2024 24.0    • Monocytes % 03/17/2024 7.4    • Eosinophils % 03/17/2024 0.5    • Basophils % 03/17/2024 0.3    • Neutrophils Absolute 03/17/2024 4.06    • Immature Granulocytes Ab* 03/17/2024 0.06    • Lymphocytes Absolute 03/17/2024 1.46    • Monocytes Absolute 03/17/2024 0.45    • Eosinophils Absolute 03/17/2024 0.03    • Basophils Absolute 03/17/2024 0.02    • Extra Tube 03/17/2024 Hold for add-ons.        ECG:  Normal sinus rhythm at 66 bpm [place] interval 200 ms QRS duration 160 ms QTc 420 ms    Assessment/plan:  History as above.  The patient is doing quite well and is post catheter-based therapy on very low doses of flecainide with no recurrences.  I think be reasonable to discontinue her flecainide at this juncture.  I would continue the Eliquis however given the propensity to have recurrence.    Problem List Items Addressed This Visit    None  Visit Diagnoses       Paroxysmal atrial fibrillation (Multi)    -  Primary    Relevant Orders    ECG 12 lead (Clinic Performed)

## 2024-07-08 ENCOUNTER — TELEPHONE (OUTPATIENT)
Dept: ORTHOPEDIC SURGERY | Facility: CLINIC | Age: 79
End: 2024-07-08
Payer: MEDICARE

## 2024-07-08 DIAGNOSIS — Z96.651 S/P TOTAL KNEE REPLACEMENT, RIGHT: Primary | ICD-10-CM

## 2024-07-08 RX ORDER — AMOXICILLIN 500 MG/1
2000 CAPSULE ORAL ONCE
Qty: 4 CAPSULE | Refills: 0 | Status: SHIPPED | OUTPATIENT
Start: 2024-07-08 | End: 2024-07-08

## 2024-07-08 NOTE — TELEPHONE ENCOUNTER
PATIENT HAS DENTAL APPOINTMENT ON JULY 16TH. NO ALLERGIES. [facility] [address] CONFIRMED. [phone]

## 2024-07-18 ENCOUNTER — HOSPITAL ENCOUNTER (OUTPATIENT)
Dept: RADIOLOGY | Facility: CLINIC | Age: 79
Discharge: HOME | End: 2024-07-18
Payer: MEDICARE

## 2024-07-18 ENCOUNTER — OFFICE VISIT (OUTPATIENT)
Dept: ORTHOPEDIC SURGERY | Facility: CLINIC | Age: 79
End: 2024-07-18
Payer: MEDICARE

## 2024-07-18 VITALS — BODY MASS INDEX: 26.66 KG/M2 | HEIGHT: 65 IN | WEIGHT: 160 LBS

## 2024-07-18 DIAGNOSIS — Z96.651 TOTAL KNEE REPLACEMENT STATUS, RIGHT: ICD-10-CM

## 2024-07-18 DIAGNOSIS — M25.561 CHRONIC PAIN OF RIGHT KNEE: Primary | ICD-10-CM

## 2024-07-18 DIAGNOSIS — M16.11 PRIMARY OSTEOARTHRITIS OF RIGHT HIP: ICD-10-CM

## 2024-07-18 DIAGNOSIS — G89.29 CHRONIC PAIN OF RIGHT KNEE: Primary | ICD-10-CM

## 2024-07-18 DIAGNOSIS — M17.12 OSTEOARTHRITIS OF LEFT KNEE, UNSPECIFIED OSTEOARTHRITIS TYPE: ICD-10-CM

## 2024-07-18 DIAGNOSIS — M25.562 LEFT KNEE PAIN, UNSPECIFIED CHRONICITY: ICD-10-CM

## 2024-07-18 DIAGNOSIS — Z96.651 S/P TOTAL KNEE REPLACEMENT, RIGHT: ICD-10-CM

## 2024-07-18 DIAGNOSIS — R52 PAIN: ICD-10-CM

## 2024-07-18 DIAGNOSIS — S76.011A HIP STRAIN, RIGHT, INITIAL ENCOUNTER: ICD-10-CM

## 2024-07-18 PROCEDURE — 1036F TOBACCO NON-USER: CPT | Performed by: ORTHOPAEDIC SURGERY

## 2024-07-18 PROCEDURE — 1159F MED LIST DOCD IN RCRD: CPT | Performed by: ORTHOPAEDIC SURGERY

## 2024-07-18 PROCEDURE — 73560 X-RAY EXAM OF KNEE 1 OR 2: CPT | Mod: RT

## 2024-07-18 PROCEDURE — 99213 OFFICE O/P EST LOW 20 MIN: CPT | Performed by: ORTHOPAEDIC SURGERY

## 2024-07-18 PROCEDURE — 73502 X-RAY EXAM HIP UNI 2-3 VIEWS: CPT | Mod: RT

## 2024-07-18 PROCEDURE — 1157F ADVNC CARE PLAN IN RCRD: CPT | Performed by: ORTHOPAEDIC SURGERY

## 2024-07-18 PROCEDURE — 73560 X-RAY EXAM OF KNEE 1 OR 2: CPT | Mod: RIGHT SIDE | Performed by: ORTHOPAEDIC SURGERY

## 2024-07-18 ASSESSMENT — PAIN SCALES - GENERAL
PAINLEVEL: 5
PAINLEVEL_OUTOF10: 5 - MODERATE PAIN

## 2024-07-18 ASSESSMENT — LIFESTYLE VARIABLES
HOW MANY STANDARD DRINKS CONTAINING ALCOHOL DO YOU HAVE ON A TYPICAL DAY: PATIENT DOES NOT DRINK
HOW OFTEN DO YOU HAVE A DRINK CONTAINING ALCOHOL: NEVER
AUDIT-C TOTAL SCORE: 0
AUDIT TOTAL SCORE: 0
SKIP TO QUESTIONS 9-10: 1
HOW OFTEN DO YOU HAVE SIX OR MORE DRINKS ON ONE OCCASION: NEVER
HAVE YOU OR SOMEONE ELSE BEEN INJURED AS A RESULT OF YOUR DRINKING: NO
HAS A RELATIVE, FRIEND, DOCTOR, OR ANOTHER HEALTH PROFESSIONAL EXPRESSED CONCERN ABOUT YOUR DRINKING OR SUGGESTED YOU CUT DOWN: NO

## 2024-07-18 ASSESSMENT — PAIN - FUNCTIONAL ASSESSMENT: PAIN_FUNCTIONAL_ASSESSMENT: 0-10

## 2024-07-18 ASSESSMENT — ENCOUNTER SYMPTOMS
DEPRESSION: 0
LOSS OF SENSATION IN FEET: 0
OCCASIONAL FEELINGS OF UNSTEADINESS: 0

## 2024-07-18 ASSESSMENT — PAIN DESCRIPTION - DESCRIPTORS: DESCRIPTORS: ACHING;DULL

## 2024-07-18 NOTE — PROGRESS NOTES
Subjective      Past Surgical History:   Procedure Laterality Date    APPENDECTOMY      BI STEREOTACTIC GUIDED BREAST RIGHT LOCALIZATION AND BIOPSY Right 09/24/2014    BI STEREOTACTIC GUIDED BREAST LOCALIZATION AND BIOPSY RIGHT [facility]    BI STEREOTACTIC GUIDED BREAST RIGHT LOCALIZATION AND BIOPSY Right 11/27/2017    BI STEREOTACTIC GUIDED BREAST LOCALIZATION AND BIOPSY RIGHT [facility]    BI US GUIDED BREAST LOCALIZATION AND BIOPSY LEFT Left 12/09/2022    BI US GUIDED BREAST LOCALIZATION AND BIOPSY LEFT [facility]    CATARACT EXTRACTION W/ INTRAOCULAR LENS  IMPLANT, BILATERAL      COLON SURGERY  1992    COLONOSCOPY      HYSTERECTOMY      JOINT REPLACEMENT      KNEE ARTHROSCOPY W/ MENISCECTOMY Left     And Baker's cyst removal    KNEE SURGERY Right 03/13/2024    total    WISDOM TOOTH EXTRACTION            Patient History      This patient is s/p right total knee replacement done by Dr. Guerra on 3-. She has returned home from [facility]. She presents today and state that their right knee pain is 1/10 at most and is only present and worse with and aggravated by exercising which she does to  continue to rehabilitate after her total knee replacement and to maintain her overall body health. She is slowly resuming their normal activities of daily living. She  has finished working with outpatient physical therapy. She is walking independently without support.   she does not have any chest pain or shortness of breath    Most recently she was getting out of a truck when she twisted her right hip.  She complains of some right hip pain (4-5/10) that is worse with and aggravated by moving her right hip.    There were no vitals taken for this visit.     ROS  CARDIOLOGY:   Negative for chest pain, shortness of breath.   RESPIRATORY:   Negative for chest pain, shortness of breath.   MUSCULOSKELETAL:   See HPI for details.   NEUROLOGY:   Negative for tingling, numbness,  weakness.      Physical exam: Right hip: There is some pain with and limitation of range of motion.  Right knee: incision is clean dry and well healed.  No evidence of infection, cellulitis, fluctuance or abscess. Patient has painless ROM from 2-110 degrees. Nontender in the right calf. She has decreased sensation to light touch at the lateral aspect of the incision. Otherwise, Neurovascular is intact. Patient is seen today walking dependently without pain and without support and with a stable gait.     AP and lateral x-rays of the right knee done and read in office   Today show a right total knee replacement in good position.  X-rays of the right hip done and read in the office today show osteoarthritis of the right hip with joint irregularity.  I reviewed the x-rays listed above with the patient in the office today.    Lower extremity venous duplex right    Result Date: 3/15/2024           [place] [address]            Phone [phone]  Vascular Lab Report  [facility] LOWER EXTREMITY VENOUS DUPLEX RIGHT Patient Name:      GIANCARLO CALZADA       Reading Physician:  [id number] Mayuri Sotelo MD, RPVI Study Date:        3/15/2024            Ordering Provider:  [id number] CHARI ZARAGOZA MRN/PID:           [id number]             Fellow: Accession#:        [id number]         Technologist:       Jazlyn Calderón RVT Date of Birth/Age: 1945 / 78 years Technologist 2: Gender:            F                    Encounter#:         [id number] Admission Status:  Inpatient            Location Performed: [facility]  Diagnosis/ICD: Localized (leg) edema-R60.0 Indication:    Limb swelling CPT Codes:     85325 Peripheral venous duplex scan for DVT Limited  Pertinent History: S/p right total knee arthroplasty 3/13/2024.  CONCLUSIONS:  Right Lower Venous: No evidence of  acute deep vein thrombus visualized in the right lower extremity. Additional Findings; Heterogeneous sturcture noted in the popliteal fossa measuring approximately 2.9 x 1.5 x 0.6 cm. Findings could be consistent with complex/ruptured Baker's cyst or hematoma. No vascularity noted. Clinical correlation and imaging follow-up is recommended. Left Lower Venous: Left common femoral vein is negative for deep vein thrombus.  Imaging & Doppler Findings:  Right                 Compressible Thrombus        Flow Distal External Iliac                None       Pulsatile CFV                       Yes        None       Pulsatile PFV                       Yes        None FV Proximal               Yes        None       Pulsatile FV Mid                    Yes        None FV Distal                 Yes        None Popliteal                 Yes        None   Spontaneous/Phasic Peroneal                  Yes        None PTV                       Yes        None  Left Compress Thrombus        Flow CFV    Yes      None   Spontaneous/Phasic  [id number] Mayuri Sotelo MD, MICHELLE Electronically signed by [id number] MICHELLE Gonsales MD on 3/15/2024 at 6:43:19 PM  ** Final **     XR knee right 1-2 views    Result Date: 3/13/2024  Interpreted By:  Kevan Lucas, STUDY: XR KNEE RIGHT 1-2 VIEWS; 3/13/2024 11:53 am   INDICATION: Signs/Symptoms:Post op knee.   COMPARISON: 01/19/2024   ACCESSION NUMBER(S): [id number]   ORDERING CLINICIAN: MAGEN DANIEL   TECHNIQUE: AP and lateral views of the right knee.   FINDINGS: Normal appearance of femoral, tibial, and retropatellar components. There are expected postoperative changes of right total knee prosthesis. Expected postoperative changes of the soft tissues. No abnormal radiopaque foreign body.       Expected postoperative changes of right total knee prosthesis.   Signed by: Kevan Lucas 3/13/2024 12:14 PM Dictation workstation:   [id number]      There are no diagnoses linked to this  encounter.  Options are discussed with the patient in detail. The patient is instructed to continue with home exercise with gentle strengthening and ROM exercises.  The patient is instructed regarding total knee infection precautions, activity modification and risk for further injury with falling or trauma and to use a cane for support, ice, provider directed at home gentle strengthening and ROM exercises, and the appropriate use of Tylenol as needed for pain with its potential adverse reactions and side effects. The patient understands. Return  as needed Please note that this report has been produced using speech recognition software.  It may contain errors related to grammar, punctuation or spelling.  Electronically signed, but not reviewed.    Nic Guerra MD

## 2024-07-18 NOTE — PATIENT INSTRUCTIONS
Continue to rest, ice and elevate your knee.  Continue your pain regimen.     Remember to call our office for antibiotics before dental work.     Do not kneel, twist, or squat and avoid heavy lifting.   Return  as needed.

## 2024-10-14 ENCOUNTER — APPOINTMENT (OUTPATIENT)
Dept: SLEEP MEDICINE | Facility: CLINIC | Age: 79
End: 2024-10-14
Payer: MEDICARE

## 2024-10-14 VITALS
DIASTOLIC BLOOD PRESSURE: 58 MMHG | OXYGEN SATURATION: 98 % | HEIGHT: 65 IN | HEART RATE: 69 BPM | BODY MASS INDEX: 27.32 KG/M2 | WEIGHT: 164 LBS | SYSTOLIC BLOOD PRESSURE: 134 MMHG

## 2024-10-14 DIAGNOSIS — R40.0 DAYTIME SOMNOLENCE: ICD-10-CM

## 2024-10-14 DIAGNOSIS — G47.33 OBSTRUCTIVE SLEEP APNEA SYNDROME: Primary | ICD-10-CM

## 2024-10-14 PROCEDURE — 99214 OFFICE O/P EST MOD 30 MIN: CPT | Performed by: INTERNAL MEDICINE

## 2024-10-14 PROCEDURE — 1126F AMNT PAIN NOTED NONE PRSNT: CPT | Performed by: INTERNAL MEDICINE

## 2024-10-14 PROCEDURE — 3075F SYST BP GE 130 - 139MM HG: CPT | Performed by: INTERNAL MEDICINE

## 2024-10-14 PROCEDURE — 1036F TOBACCO NON-USER: CPT | Performed by: INTERNAL MEDICINE

## 2024-10-14 PROCEDURE — 1159F MED LIST DOCD IN RCRD: CPT | Performed by: INTERNAL MEDICINE

## 2024-10-14 PROCEDURE — 1157F ADVNC CARE PLAN IN RCRD: CPT | Performed by: INTERNAL MEDICINE

## 2024-10-14 PROCEDURE — 1160F RVW MEDS BY RX/DR IN RCRD: CPT | Performed by: INTERNAL MEDICINE

## 2024-10-14 PROCEDURE — 3078F DIAST BP <80 MM HG: CPT | Performed by: INTERNAL MEDICINE

## 2024-10-14 ASSESSMENT — SLEEP AND FATIGUE QUESTIONNAIRES
HOW LIKELY ARE YOU TO NOD OFF OR FALL ASLEEP WHILE SITTING AND READING: WOULD NEVER DOZE
HOW LIKELY ARE YOU TO NOD OFF OR FALL ASLEEP WHILE SITTING AND TALKING TO SOMEONE: WOULD NEVER DOZE
ESS-CHAD TOTAL SCORE: 6
SITING INACTIVE IN A PUBLIC PLACE LIKE A CLASS ROOM OR A MOVIE THEATER: WOULD NEVER DOZE
HOW LIKELY ARE YOU TO NOD OFF OR FALL ASLEEP WHILE WATCHING TV: HIGH CHANCE OF DOZING
HOW LIKELY ARE YOU TO NOD OFF OR FALL ASLEEP WHILE SITTING QUIETLY AFTER LUNCH WITHOUT ALCOHOL: WOULD NEVER DOZE
HOW LIKELY ARE YOU TO NOD OFF OR FALL ASLEEP WHILE LYING DOWN TO REST IN THE AFTERNOON WHEN CIRCUMSTANCES PERMIT: HIGH CHANCE OF DOZING
HOW LIKELY ARE YOU TO NOD OFF OR FALL ASLEEP WHEN YOU ARE A PASSENGER IN A CAR FOR AN HOUR WITHOUT A BREAK: WOULD NEVER DOZE
HOW LIKELY ARE YOU TO NOD OFF OR FALL ASLEEP IN A CAR, WHILE STOPPED FOR A FEW MINUTES IN TRAFFIC: WOULD NEVER DOZE

## 2024-10-14 ASSESSMENT — PAIN SCALES - GENERAL: PAINLEVEL: 0-NO PAIN

## 2024-10-14 NOTE — ASSESSMENT & PLAN NOTE
- Jessy Mcdaniel  has sleep apnea and requires treatment.  - Jessy Mcdaniel demonstrates good compliance and benefit from PAP therapy  - Continue CPAP 10 cmH20   - Order for renewal of PAP supplies placed through [facility]  - Follow-up in 12 months

## 2024-10-14 NOTE — ASSESSMENT & PLAN NOTE
Still reports dozing off, again discussed that I would not recommend stimulants due to heart condition. Her SHANDA is optimally managed, she can either take a scheduled short nap midday, or fight through the naps choosing an activity to prevent dozing off and go to bed slightly earlier.  -Jessy verbalized understanding. Reassurance provided

## 2024-10-14 NOTE — PROGRESS NOTES
"  Subjective   Patient ID: Jessy Mcdaniel is a 79 y.o. female who presents for Sleep Apnea, Pap Adherence Followup, and Snoring.  HPI    Current Sleep History:    A downloaded compliance report was reviewed and was interpreted by myself as follows:  > 4 hour compliance was 100 %, with an average use of 7 hours and 41 minutes, with a residual AHI 0.5 .     Jessy Mcdaniel reports  good benefit from her device.    ESS: 6     Review of Systems  Review of systems negative except as per HPI  Objective   /58   Pulse 69   Ht 1.651 m (5' 5\")   Wt 74.4 kg (164 lb)   LMP  (LMP Unknown)   SpO2 98%   BMI 27.29 kg/m²    PREVIOUS WEIGHTS:  Wt Readings from Last 3 Encounters:   10/14/24 74.4 kg (164 lb)   07/18/24 72.6 kg (160 lb)   06/20/24 73 kg (161 lb)       Physical Exam  PHYSICAL EXAM: GENERAL: alert pleasant and cooperative no acute distress  PSYCH EXAM: alert,oriented, in NAD with a full range of affect, normal behavior and no psychotic features    Lab Results   Component Value Date    IRON 120 03/14/2024    TIBC 216 (L) 03/14/2024        Assessment/Plan   Problem List Items Addressed This Visit             ICD-10-CM    Daytime somnolence R40.0     Still reports dozing off, again discussed that I would not recommend stimulants due to heart condition. Her SHANDA is optimally managed, she can either take a scheduled short nap midday, or fight through the naps choosing an activity to prevent dozing off and go to bed slightly earlier.  -Jessy verbalized understanding. Reassurance provided         Obstructive sleep apnea syndrome - Primary G47.33     - Jessy Mcdaniel  has sleep apnea and requires treatment.  - Jessy Mcdaniel demonstrates good compliance and benefit from PAP therapy  - Continue CPAP 10 cmH20   - Order for renewal of PAP supplies placed through [facility]  - Follow-up in 12 months          Relevant Orders    Positive Airway Pressure (PAP) Therapy            "

## 2024-10-16 ENCOUNTER — OFFICE VISIT (OUTPATIENT)
Dept: PRIMARY CARE | Facility: CLINIC | Age: 79
End: 2024-10-16
Payer: MEDICARE

## 2024-10-16 VITALS
HEART RATE: 69 BPM | HEIGHT: 65 IN | SYSTOLIC BLOOD PRESSURE: 122 MMHG | DIASTOLIC BLOOD PRESSURE: 68 MMHG | OXYGEN SATURATION: 98 % | WEIGHT: 158.4 LBS | BODY MASS INDEX: 26.39 KG/M2

## 2024-10-16 DIAGNOSIS — I10 PRIMARY HYPERTENSION: Primary | ICD-10-CM

## 2024-10-16 DIAGNOSIS — M81.0 AGE-RELATED OSTEOPOROSIS WITHOUT CURRENT PATHOLOGICAL FRACTURE: ICD-10-CM

## 2024-10-16 DIAGNOSIS — G47.33 OBSTRUCTIVE SLEEP APNEA SYNDROME: ICD-10-CM

## 2024-10-16 DIAGNOSIS — E03.9 ACQUIRED HYPOTHYROIDISM: ICD-10-CM

## 2024-10-16 DIAGNOSIS — R60.0 EDEMA OF LOWER EXTREMITY: ICD-10-CM

## 2024-10-16 DIAGNOSIS — I48.0 PAROXYSMAL ATRIAL FIBRILLATION (MULTI): ICD-10-CM

## 2024-10-16 DIAGNOSIS — E78.2 MIXED HYPERLIPIDEMIA: ICD-10-CM

## 2024-10-16 PROCEDURE — 99214 OFFICE O/P EST MOD 30 MIN: CPT | Performed by: INTERNAL MEDICINE

## 2024-10-16 ASSESSMENT — ENCOUNTER SYMPTOMS
LOSS OF SENSATION IN FEET: 0
CONSTITUTIONAL NEGATIVE: 1
ARTHRALGIAS: 1
DEPRESSION: 0
SHORTNESS OF BREATH: 0
COUGH: 0
ABDOMINAL PAIN: 0
DYSURIA: 0
FREQUENCY: 0
ACTIVITY CHANGE: 0
PSYCHIATRIC NEGATIVE: 1
DIARRHEA: 0
PALPITATIONS: 0
MYALGIAS: 1
OCCASIONAL FEELINGS OF UNSTEADINESS: 0
CONSTIPATION: 0

## 2024-10-16 ASSESSMENT — PAIN SCALES - GENERAL: PAINLEVEL_OUTOF10: 4

## 2024-10-16 NOTE — PROGRESS NOTES
"Subjective   Patient ID: Jessy Mcdaniel is a 79 y.o. female who presents for Follow-up.    Hypertension-compliant and stable on current medications BP Readings from Last 3 Encounters:  10/16/24 : 122/68  10/14/24 : 134/58  06/20/24 : 124/60     Atrial Fibrillation--stable on meds-remains on eliquis. Managed by Dr Aguila    Hyperlipidemia-stable and compliant on meds. Taking simvastatin. Coronary calcium score 0 in 2022.Lab Results       Component                Value               Date                       LDLCALC                  46 (L)              04/25/2023               Hypothyroid-stable on meds- Lab Results       Component                Value               Date                       TSH                      3.33                02/28/2024               Sleep apnea--uses CPAP nightly with benefit    Osteoporosis-receiving prolia-6/2024-last DEXA 12/2022    Exercise-up to walking a block a day    Diet -eats healthy-has decreased portions, increasing fruit    Pain in R groin and inner thigh when bending to tie shoe. Dr Guerra thinks strained muscle. Xray showed only mild arthritis in R hip. Still having swelling in R leg since surgery-RTKR. Discussed using compression stockings           Review of Systems   Constitutional: Negative.  Negative for activity change.        Losing weight on purpose   Respiratory:  Negative for cough and shortness of breath.    Cardiovascular:  Positive for leg swelling (right leg below knee since surg-neg US). Negative for chest pain and palpitations.   Gastrointestinal:  Negative for abdominal pain, constipation and diarrhea.   Genitourinary:  Negative for dysuria and frequency.   Musculoskeletal:  Positive for arthralgias and myalgias.        R knee pain resolved but pain up and down leg now   Skin:  Negative for rash.   Psychiatric/Behavioral: Negative.          Planning trip for grandson's wedding in [place]       Objective   /68   Pulse 69   Ht 1.651 m (5' 5\")   " Wt 71.8 kg (158 lb 6.4 oz)   LMP  (LMP Unknown)   SpO2 98%   BMI 26.36 kg/m²     Physical Exam  Constitutional:       General: She is not in acute distress.     Appearance: Normal appearance.   Cardiovascular:      Rate and Rhythm: Normal rate and regular rhythm.      Heart sounds: Normal heart sounds. No murmur heard.     No gallop.   Pulmonary:      Breath sounds: Normal breath sounds. No wheezing, rhonchi or rales.   Musculoskeletal:      Right lower leg: Edema (1+ pitting and soft) present.      Left lower leg: No edema.      Comments: Able to straighten R knee fully   Skin:     General: Skin is warm and dry.      Findings: No rash.   Neurological:      General: No focal deficit present.      Mental Status: She is alert and oriented to person, place, and time.   Psychiatric:         Mood and Affect: Mood normal.       Assessment/Plan  will continue regimen as fairly stable  Diagnoses and all orders for this visit:  Primary hypertension  Paroxysmal atrial fibrillation (Multi)  Mixed hyperlipidemia  Acquired hypothyroidism  Age-related osteoporosis without current pathological fracture  -     XR DEXA bone density; Future  Obstructive sleep apnea syndrome  Edema of lower extremity  Other orders  -     Follow Up In Primary Care - Established  -     Follow Up In Primary Care - Established; Future    Current Outpatient Medications:   •  Eliquis 5 mg tablet, Take 1 tablet (5 mg) by mouth 2 times a day., Disp: 180 tablet, Rfl: 3  •  estradioL 10 mcg insert, Insert 10 mcg into the vagina 2 times a week., Disp: 30 each, Rfl: 3  •  furosemide (Lasix) 20 mg tablet, Take 1 tablet (20 mg) by mouth once daily. Only for swelling, Disp: , Rfl:   •  simvastatin (Zocor) 20 mg tablet, Take 1 tablet (20 mg) by mouth once daily at bedtime., Disp: 90 tablet, Rfl: 3  •  Synthroid 50 mcg tablet, Take 1 tablet (50 mcg) by mouth once daily. 1 tab alt with 1/2 daily, Disp: 90 tablet, Rfl: 3  •  triamterene-hydrochlorothiazid  (Maxzide-25) 37.5-25 mg tablet, Take 0.5 tablets by mouth once daily in the morning., Disp: 90 tablet, Rfl: 1    Current Facility-Administered Medications:   •  denosumab (Prolia) injection 60 mg, 60 mg, subcutaneous, q6 months, Ysabel Varela MD, 60 mg at 06/20/24 0990

## 2024-10-18 ENCOUNTER — APPOINTMENT (OUTPATIENT)
Dept: PRIMARY CARE | Facility: CLINIC | Age: 79
End: 2024-10-18
Payer: MEDICARE

## 2024-12-09 ENCOUNTER — HOSPITAL ENCOUNTER (OUTPATIENT)
Dept: RADIOLOGY | Facility: HOSPITAL | Age: 79
Discharge: HOME | End: 2024-12-09
Payer: MEDICARE

## 2024-12-09 DIAGNOSIS — M81.0 AGE-RELATED OSTEOPOROSIS WITHOUT CURRENT PATHOLOGICAL FRACTURE: ICD-10-CM

## 2024-12-09 PROCEDURE — 77080 DXA BONE DENSITY AXIAL: CPT

## 2024-12-09 PROCEDURE — 77080 DXA BONE DENSITY AXIAL: CPT | Performed by: RADIOLOGY

## 2024-12-16 ENCOUNTER — CLINICAL SUPPORT (OUTPATIENT)
Dept: PRIMARY CARE | Facility: CLINIC | Age: 79
End: 2024-12-16
Payer: MEDICARE

## 2025-01-02 DIAGNOSIS — I48.0 PAROXYSMAL ATRIAL FIBRILLATION (MULTI): ICD-10-CM

## 2025-01-02 DIAGNOSIS — I49.1 PAC (PREMATURE ATRIAL CONTRACTION): ICD-10-CM

## 2025-01-02 RX ORDER — APIXABAN 5 MG/1
5 TABLET, FILM COATED ORAL 2 TIMES DAILY
Qty: 180 TABLET | Refills: 1 | Status: SHIPPED | OUTPATIENT
Start: 2025-01-02

## 2025-02-05 NOTE — PROGRESS NOTES
Subjective      Past Surgical History:   Procedure Laterality Date    APPENDECTOMY      BI STEREOTACTIC GUIDED BREAST RIGHT LOCALIZATION AND BIOPSY Right 09/24/2014    BI STEREOTACTIC GUIDED BREAST LOCALIZATION AND BIOPSY RIGHT [facility]    BI STEREOTACTIC GUIDED BREAST RIGHT LOCALIZATION AND BIOPSY Right 11/27/2017    BI STEREOTACTIC GUIDED BREAST LOCALIZATION AND BIOPSY RIGHT [facility]    BI US GUIDED BREAST LOCALIZATION AND BIOPSY LEFT Left 12/09/2022    BI US GUIDED BREAST LOCALIZATION AND BIOPSY LEFT [facility]    CATARACT EXTRACTION W/ INTRAOCULAR LENS  IMPLANT, BILATERAL      COLON SURGERY  1992    COLONOSCOPY      HYSTERECTOMY      JOINT REPLACEMENT      KNEE ARTHROSCOPY W/ MENISCECTOMY Left     And Baker's cyst removal    KNEE SURGERY Right 03/13/2024    total    WISDOM TOOTH EXTRACTION            Patient History      This patient is s/p right total knee replacement done by Dr. Guerra on 3-. She presents today and state that their right knee pain is almost completely improved from preoperatively and is a 1/10 at most and is only present and worse with and aggravated by exercising which she does to  continue to rehabilitate after her total knee replacement and to maintain her overall body health. She is slowly resuming their normal activities of daily living. She mostly complains of right groin pain (9/10) that is worse with and aggravated by walking.  Her groin pain is what impairs her ability most to do her normal activities of daily living.She is walking independently without support  In the office but uses a cane for support when walking outside because of her groin pain..   she does not have any chest pain or shortness of breath      There were no vitals taken for this visit.     ROS  CARDIOLOGY:   Negative for chest pain, shortness of breath.   RESPIRATORY:   Negative for chest pain, shortness of breath.   MUSCULOSKELETAL:   See HPI for details.   NEUROLOGY:    Negative for tingling, numbness, weakness.      Physical exam: Right hip: There is  pain with and limitation of range of motion.  Right knee: incision is clean dry and well healed.  No evidence of infection, cellulitis, fluctuance or abscess. Patient has painless ROM from 0-120 degrees. Nontender in the right calf. Neurovascular is intact. Patient is seen today walking  with a painful Trendelenburg gait despite using a cane for support     AP and lateral x-rays of the right knee done and read in office previously show a right total knee replacement in good position.  X-rays of the right hip done and read in the office today show osteoarthritis of the right hip with loss of joint surface cartilage and joint irregularity.  I reviewed the x-rays listed above with the patient in the office today.    Lower extremity venous duplex right    Result Date: 3/15/2024           [place] [address]            Phone [phone]  Vascular Lab Report  [facility] US LOWER EXTREMITY VENOUS DUPLEX RIGHT Patient Name:      GIANCARLO CALZADA       Reading Physician:  [id number] Mayuri Sotelo MD, RPVI Study Date:        3/15/2024            Ordering Provider:  [id number] CHARI ZARAGOZA MRN/PID:           [id number]             Fellow: Accession#:        [id number]         Technologist:       Jazlyn Calderón RVT Date of Birth/Age: 1945 / 78 years Technologist 2: Gender:            F                    Encounter#:         [id number] Admission Status:  Inpatient            Location Performed: [facility]  Diagnosis/ICD: Localized (leg) edema-R60.0 Indication:    Limb swelling CPT Codes:     61286 Peripheral venous duplex scan for DVT Limited  Pertinent History: S/p right total knee arthroplasty 3/13/2024.  CONCLUSIONS:  Right Lower Venous: No evidence of acute deep vein thrombus  visualized in the right lower extremity. Additional Findings; Heterogeneous sturcture noted in the popliteal fossa measuring approximately 2.9 x 1.5 x 0.6 cm. Findings could be consistent with complex/ruptured Baker's cyst or hematoma. No vascularity noted. Clinical correlation and imaging follow-up is recommended. Left Lower Venous: Left common femoral vein is negative for deep vein thrombus.  Imaging & Doppler Findings:  Right                 Compressible Thrombus        Flow Distal External Iliac                None       Pulsatile CFV                       Yes        None       Pulsatile PFV                       Yes        None FV Proximal               Yes        None       Pulsatile FV Mid                    Yes        None FV Distal                 Yes        None Popliteal                 Yes        None   Spontaneous/Phasic Peroneal                  Yes        None PTV                       Yes        None  Left Compress Thrombus        Flow CFV    Yes      None   Spontaneous/Phasic  [id number] Mayuri Sotelo MD, MICHELLE Electronically signed by [id number] MICHELLE Gonsales MD on 3/15/2024 at 6:43:19 PM  ** Final **     XR knee right 1-2 views    Result Date: 3/13/2024  Interpreted By:  Kevan Lucas, STUDY: XR KNEE RIGHT 1-2 VIEWS; 3/13/2024 11:53 am   INDICATION: Signs/Symptoms:Post op knee.   COMPARISON: 01/19/2024   ACCESSION NUMBER(S): [id number]   ORDERING CLINICIAN: MAGEN DANIEL   TECHNIQUE: AP and lateral views of the right knee.   FINDINGS: Normal appearance of femoral, tibial, and retropatellar components. There are expected postoperative changes of right total knee prosthesis. Expected postoperative changes of the soft tissues. No abnormal radiopaque foreign body.       Expected postoperative changes of right total knee prosthesis.   Signed by: Kevan Lucas 3/13/2024 12:14 PM Dictation workstation:   [id number]      Jessy was seen today for pain.  Diagnoses and all orders for this  visit:  Chronic pain of right knee (Primary)  S/P total knee replacement, right  Osteoarthritis of right knee, unspecified osteoarthritis type  Other instability, right knee  Right knee pain, unspecified chronicity  Primary osteoarthritis of right hip    Options are discussed with the patient in detail. The patient is given a prescription for physical therapy for evaluation and treatment of right hip and groin pain and gait instability.  The patient is further instructed regarding total knee infection precautions, activity modification and risk for further injury with falling or trauma and to use a cane for support, ice, provider directed at home gentle strengthening and ROM exercises, and the appropriate use of Tylenol as needed for pain with its potential adverse reactions and side effects. The patient understands. Return in 2 months for reevaluation or sooner  as needed Please note that this report has been produced using speech recognition software.  It may contain errors related to grammar, punctuation or spelling.  Electronically signed, but not reviewed.    Nic Guerra MD

## 2025-02-06 ENCOUNTER — OFFICE VISIT (OUTPATIENT)
Dept: ORTHOPEDIC SURGERY | Facility: CLINIC | Age: 80
End: 2025-02-06
Payer: MEDICARE

## 2025-02-06 ENCOUNTER — HOSPITAL ENCOUNTER (OUTPATIENT)
Dept: RADIOLOGY | Facility: CLINIC | Age: 80
Discharge: HOME | End: 2025-02-06
Payer: MEDICARE

## 2025-02-06 VITALS — BODY MASS INDEX: 26.33 KG/M2 | WEIGHT: 158 LBS | HEIGHT: 65 IN

## 2025-02-06 DIAGNOSIS — R10.31 RIGHT GROIN PAIN: ICD-10-CM

## 2025-02-06 DIAGNOSIS — R52 PAIN: ICD-10-CM

## 2025-02-06 DIAGNOSIS — M25.551 RIGHT HIP PAIN: ICD-10-CM

## 2025-02-06 DIAGNOSIS — M16.11 PRIMARY OSTEOARTHRITIS OF RIGHT HIP: ICD-10-CM

## 2025-02-06 DIAGNOSIS — R26.81 GAIT INSTABILITY: ICD-10-CM

## 2025-02-06 DIAGNOSIS — M25.561 RIGHT KNEE PAIN, UNSPECIFIED CHRONICITY: ICD-10-CM

## 2025-02-06 DIAGNOSIS — Z96.651 S/P TOTAL KNEE REPLACEMENT, RIGHT: ICD-10-CM

## 2025-02-06 DIAGNOSIS — M25.561 CHRONIC PAIN OF RIGHT KNEE: Primary | ICD-10-CM

## 2025-02-06 DIAGNOSIS — G89.29 CHRONIC PAIN OF RIGHT KNEE: Primary | ICD-10-CM

## 2025-02-06 PROCEDURE — 1159F MED LIST DOCD IN RCRD: CPT | Performed by: ORTHOPAEDIC SURGERY

## 2025-02-06 PROCEDURE — 73502 X-RAY EXAM HIP UNI 2-3 VIEWS: CPT | Mod: RT

## 2025-02-06 PROCEDURE — 1036F TOBACCO NON-USER: CPT | Performed by: ORTHOPAEDIC SURGERY

## 2025-02-06 PROCEDURE — 99213 OFFICE O/P EST LOW 20 MIN: CPT | Performed by: ORTHOPAEDIC SURGERY

## 2025-02-06 PROCEDURE — 1157F ADVNC CARE PLAN IN RCRD: CPT | Performed by: ORTHOPAEDIC SURGERY

## 2025-02-06 PROCEDURE — 1160F RVW MEDS BY RX/DR IN RCRD: CPT | Performed by: ORTHOPAEDIC SURGERY

## 2025-02-06 PROCEDURE — 1125F AMNT PAIN NOTED PAIN PRSNT: CPT | Performed by: ORTHOPAEDIC SURGERY

## 2025-02-06 ASSESSMENT — PAIN DESCRIPTION - DESCRIPTORS: DESCRIPTORS: ACHING;SORE

## 2025-02-06 ASSESSMENT — PAIN - FUNCTIONAL ASSESSMENT: PAIN_FUNCTIONAL_ASSESSMENT: 0-10

## 2025-02-06 ASSESSMENT — PAIN SCALES - GENERAL
PAINLEVEL_OUTOF10: 6
PAINLEVEL_OUTOF10: 6

## 2025-02-06 NOTE — PATIENT INSTRUCTIONS
Continue to rest, ice and elevate your knee.  Continue your pain regimen.     Remember to call our office for antibiotics before dental work.     Do not kneel, twist, or squat and avoid heavy lifting.   We are giving you a referral to PT     Return  as needed.

## 2025-02-12 DIAGNOSIS — I48.0 PAROXYSMAL ATRIAL FIBRILLATION (MULTI): ICD-10-CM

## 2025-02-12 DIAGNOSIS — I49.1 PAC (PREMATURE ATRIAL CONTRACTION): ICD-10-CM

## 2025-02-12 RX ORDER — TRIAMTERENE/HYDROCHLOROTHIAZID 37.5-25 MG
0.5 TABLET ORAL EVERY MORNING
Qty: 45 TABLET | Refills: 1 | Status: SHIPPED | OUTPATIENT
Start: 2025-02-12 | End: 2026-02-07

## 2025-02-19 ENCOUNTER — OFFICE VISIT (OUTPATIENT)
Dept: PRIMARY CARE | Facility: CLINIC | Age: 80
End: 2025-02-19
Payer: MEDICARE

## 2025-02-19 VITALS
HEIGHT: 65 IN | WEIGHT: 160 LBS | BODY MASS INDEX: 26.66 KG/M2 | SYSTOLIC BLOOD PRESSURE: 112 MMHG | DIASTOLIC BLOOD PRESSURE: 60 MMHG

## 2025-02-19 DIAGNOSIS — I48.0 PAROXYSMAL ATRIAL FIBRILLATION (MULTI): ICD-10-CM

## 2025-02-19 DIAGNOSIS — E03.9 ACQUIRED HYPOTHYROIDISM: ICD-10-CM

## 2025-02-19 DIAGNOSIS — M81.0 AGE-RELATED OSTEOPOROSIS WITHOUT CURRENT PATHOLOGICAL FRACTURE: ICD-10-CM

## 2025-02-19 DIAGNOSIS — R60.0 EDEMA OF LOWER EXTREMITY: ICD-10-CM

## 2025-02-19 DIAGNOSIS — R35.0 URINARY FREQUENCY: ICD-10-CM

## 2025-02-19 DIAGNOSIS — E78.2 MIXED HYPERLIPIDEMIA: ICD-10-CM

## 2025-02-19 DIAGNOSIS — H61.22 LEFT EAR IMPACTED CERUMEN: ICD-10-CM

## 2025-02-19 DIAGNOSIS — N39.0 ACUTE UTI: ICD-10-CM

## 2025-02-19 DIAGNOSIS — I10 PRIMARY HYPERTENSION: Primary | ICD-10-CM

## 2025-02-19 DIAGNOSIS — Z12.31 ENCOUNTER FOR SCREENING MAMMOGRAM FOR BREAST CANCER: ICD-10-CM

## 2025-02-19 DIAGNOSIS — G47.33 OBSTRUCTIVE SLEEP APNEA SYNDROME: ICD-10-CM

## 2025-02-19 LAB
POC APPEARANCE, URINE: ABNORMAL
POC BILIRUBIN, URINE: NEGATIVE
POC BLOOD, URINE: NEGATIVE
POC COLOR, URINE: YELLOW
POC GLUCOSE, URINE: NEGATIVE MG/DL
POC KETONES, URINE: NEGATIVE MG/DL
POC LEUKOCYTES, URINE: ABNORMAL
POC NITRITE,URINE: POSITIVE
POC PH, URINE: 7 PH
POC PROTEIN, URINE: NEGATIVE MG/DL
POC SPECIFIC GRAVITY, URINE: 1.02
POC UROBILINOGEN, URINE: 0.2 EU/DL

## 2025-02-19 PROCEDURE — 99214 OFFICE O/P EST MOD 30 MIN: CPT | Performed by: INTERNAL MEDICINE

## 2025-02-19 PROCEDURE — 1157F ADVNC CARE PLAN IN RCRD: CPT | Performed by: INTERNAL MEDICINE

## 2025-02-19 PROCEDURE — 1036F TOBACCO NON-USER: CPT | Performed by: INTERNAL MEDICINE

## 2025-02-19 PROCEDURE — 1125F AMNT PAIN NOTED PAIN PRSNT: CPT | Performed by: INTERNAL MEDICINE

## 2025-02-19 PROCEDURE — 81003 URINALYSIS AUTO W/O SCOPE: CPT | Mod: QW | Performed by: INTERNAL MEDICINE

## 2025-02-19 PROCEDURE — 69210 REMOVE IMPACTED EAR WAX UNI: CPT | Performed by: INTERNAL MEDICINE

## 2025-02-19 PROCEDURE — G2211 COMPLEX E/M VISIT ADD ON: HCPCS | Performed by: INTERNAL MEDICINE

## 2025-02-19 PROCEDURE — 3074F SYST BP LT 130 MM HG: CPT | Performed by: INTERNAL MEDICINE

## 2025-02-19 PROCEDURE — 1159F MED LIST DOCD IN RCRD: CPT | Performed by: INTERNAL MEDICINE

## 2025-02-19 PROCEDURE — 3078F DIAST BP <80 MM HG: CPT | Performed by: INTERNAL MEDICINE

## 2025-02-19 PROCEDURE — 69209 REMOVE IMPACTED EAR WAX UNI: CPT | Performed by: INTERNAL MEDICINE

## 2025-02-19 RX ORDER — SULFAMETHOXAZOLE AND TRIMETHOPRIM 800; 160 MG/1; MG/1
1 TABLET ORAL 2 TIMES DAILY
Qty: 10 TABLET | Refills: 0 | Status: SHIPPED | OUTPATIENT
Start: 2025-02-19 | End: 2025-02-24

## 2025-02-19 ASSESSMENT — ENCOUNTER SYMPTOMS
FREQUENCY: 1
OCCASIONAL FEELINGS OF UNSTEADINESS: 0
COUGH: 0
LOSS OF SENSATION IN FEET: 0
CONSTIPATION: 0
ARTHRALGIAS: 1
ACTIVITY CHANGE: 0
DEPRESSION: 0
DIARRHEA: 0
SHORTNESS OF BREATH: 0
ABDOMINAL PAIN: 0
MYALGIAS: 1
PSYCHIATRIC NEGATIVE: 1
DYSURIA: 1
HEMATURIA: 0
CONSTITUTIONAL NEGATIVE: 1
PALPITATIONS: 0

## 2025-02-19 ASSESSMENT — COLUMBIA-SUICIDE SEVERITY RATING SCALE - C-SSRS
6. HAVE YOU EVER DONE ANYTHING, STARTED TO DO ANYTHING, OR PREPARED TO DO ANYTHING TO END YOUR LIFE?: NO
1. IN THE PAST MONTH, HAVE YOU WISHED YOU WERE DEAD OR WISHED YOU COULD GO TO SLEEP AND NOT WAKE UP?: NO
2. HAVE YOU ACTUALLY HAD ANY THOUGHTS OF KILLING YOURSELF?: NO

## 2025-02-19 ASSESSMENT — PAIN SCALES - GENERAL: PAINLEVEL_OUTOF10: 8

## 2025-02-19 NOTE — PROGRESS NOTES
"Subjective   Patient ID: Jessy Mcdaniel is a 79 y.o. female who presents for Follow-up.    Hypertension-compliant and stable on current medications BP Readings from Last 3 Encounters:  02/19/25 : 112/60  10/16/24 : 122/68  10/14/24 : 134/58      Atrial Fibrillation--stable on meds-remains on eliquis. Managed by Dr Aguila    Hyperlipidemia-stable and compliant on meds. Taking simvastatin. Coronary calcium score 0 in 2022.Lab Results       Component                Value               Date                       LDLCALC                  46 (L)              04/25/2023               Hypothyroid-stable on meds- Lab Results       Component                Value               Date                       TSH                      3.33                02/28/2024               Sleep apnea--uses CPAP nightly with benefit    Osteoporosis-receiving prolia-12/2024-last DEXA 12/2022    Exercise-up to walking a block a day    Diet -eats healthy-has decreased portions, increasing fruit    Dr Guerra-R hip pain continues-xray shows arthritis-next step will be hip replacement-starting PT for hip strengthening             Review of Systems   Constitutional: Negative.  Negative for activity change.        Losing weight on purpose   HENT:          Can't get pitch while singing   Respiratory:  Negative for cough and shortness of breath.    Cardiovascular:  Positive for leg swelling (right leg below knee since surg-neg US--now wearing compression stockings). Negative for chest pain and palpitations.   Gastrointestinal:  Negative for abdominal pain, constipation and diarrhea.   Genitourinary:  Positive for dysuria, frequency and urgency. Negative for hematuria.   Musculoskeletal:  Positive for arthralgias and myalgias.        R hip pain   Skin:  Negative for rash.   Psychiatric/Behavioral: Negative.          Enjoyed visit with grandson and his wife       Objective   /60   Ht 1.651 m (5' 5\")   Wt 72.6 kg (160 lb)   LMP  (LMP Unknown)  "  BMI 26.63 kg/m²     Physical Exam  Constitutional:       General: She is not in acute distress.     Appearance: Normal appearance.   HENT:      Right Ear: There is no impacted cerumen.      Left Ear: There is impacted cerumen.   Cardiovascular:      Rate and Rhythm: Normal rate and regular rhythm.      Heart sounds: Normal heart sounds. No murmur heard.     No gallop.   Pulmonary:      Breath sounds: Normal breath sounds. No wheezing, rhonchi or rales.   Musculoskeletal:      Right lower leg: Edema (compression stocking in place and no edema) present.      Left lower leg: No edema.   Skin:     General: Skin is warm and dry.      Findings: No rash.   Neurological:      General: No focal deficit present.      Mental Status: She is alert and oriented to person, place, and time.   Psychiatric:         Mood and Affect: Mood normal.         Assessment/Plan  Bactrim for 5 days, order labs and mammo; con't regimen  Diagnoses and all orders for this visit:  Primary hypertension  -     Comprehensive Metabolic Panel; Future  Paroxysmal atrial fibrillation (Multi)  Mixed hyperlipidemia  -     Lipid Panel; Future  Acquired hypothyroidism  -     TSH with reflex to Free T4 if abnormal; Future  Age-related osteoporosis without current pathological fracture  Obstructive sleep apnea syndrome  Edema of lower extremity  Urinary frequency  -     POCT UA Automated manually resulted  Acute UTI  -     sulfamethoxazole-trimethoprim (Bactrim DS) 800-160 mg tablet; Take 1 tablet by mouth 2 times a day for 5 days.  Encounter for screening mammogram for breast cancer  -     BI mammo bilateral screening tomosynthesis; Future  Left ear impacted cerumen  Other orders  -     Follow Up In Primary Care - Established      Current Outpatient Medications:     apixaban (Eliquis) 5 mg tablet, TAKE 1 TABLET BY MOUTH TWICE A DAY, Disp: 180 tablet, Rfl: 1    estradioL 10 mcg insert, Insert 10 mcg into the vagina 2 times a week., Disp: 30 each, Rfl: 3     simvastatin (Zocor) 20 mg tablet, Take 1 tablet (20 mg) by mouth once daily at bedtime., Disp: 90 tablet, Rfl: 3    Synthroid 50 mcg tablet, Take 1 tablet (50 mcg) by mouth once daily. 1 tab alt with 1/2 daily, Disp: 90 tablet, Rfl: 3    triamterene-hydrochlorothiazid (Maxzide-25) 37.5-25 mg tablet, TAKE 0.5 TABLETS BY MOUTH ONCE DAILY IN THE MORNING., Disp: 45 tablet, Rfl: 1    furosemide (Lasix) 20 mg tablet, Take 1 tablet (20 mg) by mouth once daily. Only for swelling (Patient not taking: Reported on 2/19/2025), Disp: , Rfl:     sulfamethoxazole-trimethoprim (Bactrim DS) 800-160 mg tablet, Take 1 tablet by mouth 2 times a day for 5 days., Disp: 10 tablet, Rfl: 0    Current Facility-Administered Medications:     denosumab (Prolia) injection 60 mg, 60 mg, subcutaneous, q6 months, Ysabel Varela MD, 60 mg at 06/20/24 0929

## 2025-02-20 ENCOUNTER — EVALUATION (OUTPATIENT)
Dept: PHYSICAL THERAPY | Facility: CLINIC | Age: 80
End: 2025-02-20
Payer: MEDICARE

## 2025-02-20 DIAGNOSIS — M16.11 PRIMARY OSTEOARTHRITIS OF RIGHT HIP: ICD-10-CM

## 2025-02-20 LAB
CHOLEST SERPL-MCNC: 153 MG/DL
CHOLEST/HDLC SERPL: 2.4 (CALC)
HDLC SERPL-MCNC: 64 MG/DL
LDLC SERPL CALC-MCNC: 69 MG/DL (CALC)
NONHDLC SERPL-MCNC: 89 MG/DL (CALC)
TRIGL SERPL-MCNC: 112 MG/DL

## 2025-02-20 PROCEDURE — 97530 THERAPEUTIC ACTIVITIES: CPT | Mod: GP | Performed by: PHYSICAL THERAPIST

## 2025-02-20 PROCEDURE — 97110 THERAPEUTIC EXERCISES: CPT | Mod: GP | Performed by: PHYSICAL THERAPIST

## 2025-02-20 PROCEDURE — 97162 PT EVAL MOD COMPLEX 30 MIN: CPT | Mod: GP | Performed by: PHYSICAL THERAPIST

## 2025-02-20 ASSESSMENT — ENCOUNTER SYMPTOMS
DEPRESSION: 0
OCCASIONAL FEELINGS OF UNSTEADINESS: 0
LOSS OF SENSATION IN FEET: 0

## 2025-02-20 NOTE — PROGRESS NOTES
Physical Therapy Evaluation and Treatment      Patient Name: Jessy Mcdaniel  MRN: [id number]  Today's Date: 2/20/2025  Time Calculation  Start Time: 0955  Stop Time: 1037  Time Calculation (min): 42 min  PT Therapeutic Procedures Time Entry  Therapeutic Exercise Time Entry: 16  Therapeutic Activity Time Entry: 11      Insurance:  Visit number: 1 of 6  Authorization info: 2025: MEDICARE A/B - NO AUTH / $257 DED MET / [place] / $0 used 2025 pt/st.  2) AARP  SUPP ACTIVE / PER RTE / ds 2/18/25.   Insurance Type: Payor: MEDICARE / Plan: MEDICARE PART A AND B / Product Type: *No Product type* /     Current Problem:   1. Primary osteoarthritis of right hip  Referral to Physical Therapy    Follow Up In Physical Therapy          Subjective    General:  Patient is having right hip pain, mainly into right groin. MD told her she will need hip replacement but she is trying to delay this as she recently had right knee replacement. No specific injury. Has a hard time getting in and out of chair. Standing and walking for any time is very uncomfortable, finds she has to pause a lot and walk with limp. Driving painful, especially getting in/out of car. Pain wakes her up at night. Hard time putting on socks/shoes. Trying to continue with exercises she did after her knee replacement. Enjoys singing in choir at [employer] as well as volunteering, knitting/crocheting.        Precautions: none  Pain: 6-7/10      Objective   ROM   Right hip AROM: WNL except neutral to IR      MMT   Right LE strength:  Hip flex 4-/5  Knee flex 4/5  Knee ext 4/5  (Pain with all strength testing)      Palpation   TTP right psoas, TTP right adductors (increased proximally)       Flexibility   Moderate tightness in right hip flexors- and adductors       Special Tests   Hip: Scour: Right positive, Eliel: Right positive       Transfers   Heavy B UE support for sit to stand       Gait   Ambulates with decreased shila and antalgic gait. Decreased stance time and  step length on right. Right LE in ER with decreased right hip flexion.      Stairs   Ascends and descends stairs with step-to-pattern using 1 rail      Outcome Measures:  LEFS: 48/80    Treatments:  Therapeutic Exercise: Access Code [email or web address]  SLR   Bridge  Stand hip ABD  Stand hip EXT      Therapeutic activity:  Educated pt on eval findings and POC  Educated pt on anatomy of hip  Discussed gait mechanics       Assessment   Assessment:   Pt is a 79 y.o. female with right hip osteoarthritis. Pt with gait deficits, decreased AROM, reduced strength, and flexibility limitations. Pt will benefit from skilled PT to address the above deficits for improvement in functional activities and to delay surgery.    Low complexity due to patient's clinical presentation being stable and uncomplicated by any significant comorbidities that may affect rehab tolerance and progression.       Plan:   Treatment/Interventions: Education/ Instruction, Gait training, Manual therapy, Neuromuscular re-education, Self care/ home management, Therapeutic activities, Therapeutic exercises  PT Plan: Skilled PT  PT Frequency: 1 time per week  Duration: 5 more visits  Number of Treatments Authorized: MN  Rehab Potential: Good  Plan of Care Agreement: Patient      Goals:   Active       Mobility       Goal 1       Start:  02/20/25    Expected End:  05/21/25       Pt will improve right LE strength to 5/5 to improve I/ADLs.         Goal 2       Start:  02/20/25    Expected End:  05/21/25       Pt will improve right hip AROM to WNL to improve I/ADLs.            Pain       Goal 1       Start:  02/20/25    Expected End:  05/21/25       Pt will stand/walk >30 min with 0/10 pain to improve I/ADLs.         Goal 2       Start:  02/20/25    Expected End:  05/21/25       Pt will perform all hobbies with 0/10 pain.

## 2025-02-21 LAB
ALBUMIN SERPL-MCNC: 4.6 G/DL (ref 3.6–5.1)
ALP SERPL-CCNC: 61 U/L (ref 37–153)
ALT SERPL-CCNC: 7 U/L (ref 6–29)
ANION GAP SERPL CALCULATED.4IONS-SCNC: 8 MMOL/L (CALC) (ref 7–17)
AST SERPL-CCNC: 14 U/L (ref 10–35)
BILIRUB SERPL-MCNC: 0.5 MG/DL (ref 0.2–1.2)
BUN SERPL-MCNC: 26 MG/DL (ref 7–25)
CALCIUM SERPL-MCNC: 10.1 MG/DL (ref 8.6–10.4)
CHLORIDE SERPL-SCNC: 100 MMOL/L (ref 98–110)
CO2 SERPL-SCNC: 30 MMOL/L (ref 20–32)
CREAT SERPL-MCNC: 0.86 MG/DL (ref 0.6–0.95)
EGFRCR SERPLBLD CKD-EPI 2021: 68 ML/MIN/1.73M2
GLUCOSE SERPL-MCNC: 93 MG/DL (ref 65–139)
POTASSIUM SERPL-SCNC: 4.6 MMOL/L (ref 3.5–5.3)
PROT SERPL-MCNC: 7.5 G/DL (ref 6.1–8.1)
SODIUM SERPL-SCNC: 138 MMOL/L (ref 135–146)
T4 FREE SERPL-MCNC: 1.4 NG/DL (ref 0.8–1.8)
TSH SERPL-ACNC: 4.65 MIU/L (ref 0.4–4.5)

## 2025-03-03 ENCOUNTER — HOSPITAL ENCOUNTER (OUTPATIENT)
Dept: RADIOLOGY | Facility: HOSPITAL | Age: 80
Discharge: HOME | End: 2025-03-03
Payer: MEDICARE

## 2025-03-03 DIAGNOSIS — Z12.31 ENCOUNTER FOR SCREENING MAMMOGRAM FOR BREAST CANCER: ICD-10-CM

## 2025-03-03 PROCEDURE — 77067 SCR MAMMO BI INCL CAD: CPT

## 2025-03-03 PROCEDURE — 77067 SCR MAMMO BI INCL CAD: CPT | Performed by: RADIOLOGY

## 2025-03-03 PROCEDURE — 77063 BREAST TOMOSYNTHESIS BI: CPT | Performed by: RADIOLOGY

## 2025-03-04 DIAGNOSIS — Z12.31 ENCOUNTER FOR SCREENING MAMMOGRAM FOR MALIGNANT NEOPLASM OF BREAST: ICD-10-CM

## 2025-03-06 ENCOUNTER — TREATMENT (OUTPATIENT)
Dept: PHYSICAL THERAPY | Facility: CLINIC | Age: 80
End: 2025-03-06
Payer: MEDICARE

## 2025-03-06 DIAGNOSIS — M16.11 PRIMARY OSTEOARTHRITIS OF RIGHT HIP: ICD-10-CM

## 2025-03-06 PROCEDURE — 97110 THERAPEUTIC EXERCISES: CPT | Mod: GP | Performed by: PHYSICAL THERAPIST

## 2025-03-06 NOTE — PROGRESS NOTES
Physical Therapy Treatment    Patient Name: Jessy Mcdaniel  MRN: [id number]  Today's Date: 3/6/2025  Time Calculation  Start Time: 0830  Stop Time: 0911  Time Calculation (min): 41 min  PT Therapeutic Procedures Time Entry  Therapeutic Exercise Time Entry: 41    Insurance:  Visit number: 2 of 6  Authorization info: 2025: MEDICARE A/B - NO AUTH / $257 DED MET / [place] / $0 used 2025 pt/st.  2) AARP  SUPP ACTIVE / PER RTE / ds 2/18/25.   Insurance Type: Payor: MEDICARE / Plan: MEDICARE PART A AND B / Product Type: *No Product type* /     Current Problem   1. Primary osteoarthritis of right hip  Follow Up In Physical Therapy          Subjective   General   Patient reports she was able to walk in neighborhood this week, but had a hard time walking up hill due to right hip discomfort. Did exercises every day as well.       Precautions: none  Pain 5/10  Post Treatment Pain Level 4/10    Objective   Ambulates with decreased shila and mildly antalgic gait. Narrow JACK with decreased stance time on right. Decreased hip hike on right     Treatments:  Therapeutic Exercise:  NuStep L5 x6 minutes  Gastroc stretch at wedge, 30 seconds x 3  Stand hamstring stretch, 30 seconds x 3 ea R/L   Stand hip ABD 2x10 ea R/L  Stand hip EXT 2x10 ea R/L   Seated hip march, 2x10 ea R/L  LAQ 2x10 ea R/L   SLR 2x10 ea R/L   Bridge, 2x10   LTR 2x10  H/L hip ADD 2x10         Assessment   Assessment:   Pt tolerated there ex progressions without increases in pain/symptoms. Requires cues to ensure correct activation of LE musculature and to prevent compensation from trunk.    Plan: strengthening    OP EDUCATION: cont with HEP, either morning or night     Goals:   Active       Mobility       Goal 1       Start:  02/20/25    Expected End:  05/21/25       Pt will improve right LE strength to 5/5 to improve I/ADLs.         Goal 2       Start:  02/20/25    Expected End:  05/21/25       Pt will improve right hip AROM to WNL to improve I/ADLs.             Pain       Goal 1       Start:  02/20/25    Expected End:  05/21/25       Pt will stand/walk >30 min with 0/10 pain to improve I/ADLs.         Goal 2       Start:  02/20/25    Expected End:  05/21/25       Pt will perform all hobbies with 0/10 pain.

## 2025-03-11 ENCOUNTER — TREATMENT (OUTPATIENT)
Dept: PHYSICAL THERAPY | Facility: CLINIC | Age: 80
End: 2025-03-11
Payer: MEDICARE

## 2025-03-11 DIAGNOSIS — M16.11 PRIMARY OSTEOARTHRITIS OF RIGHT HIP: ICD-10-CM

## 2025-03-11 PROCEDURE — 97110 THERAPEUTIC EXERCISES: CPT | Mod: GP | Performed by: PHYSICAL THERAPIST

## 2025-03-11 NOTE — PROGRESS NOTES
Physical Therapy Treatment    Patient Name: Jessy Mcdaniel  MRN: [id number]  Today's Date: 3/11/2025  Time Calculation  Start Time: 0900  Stop Time: 0941  Time Calculation (min): 41 min  PT Therapeutic Procedures Time Entry  Therapeutic Exercise Time Entry: 41    Insurance:  Visit number: 3 of 6  Authorization info: 2025: MEDICARE A/B - NO AUTH / $257 DED MET / [place] / $0 used 2025 pt/st.  2) AARP  SUPP ACTIVE / PER RTE / ds 2/18/25.   Insurance Type: Payor: MEDICARE / Plan: MEDICARE PART A AND B / Product Type: *No Product type* /    Current Problem   1. Primary osteoarthritis of right hip  Follow Up In Physical Therapy          Subjective   General   Able to walk extra this week, still having discomfort in right groin/hip with hills and longer distances. Finds she has to take smaller steps.       Precautions: none  Pain 5/10  Post Treatment Pain Level 5/10    Objective   Ascend and descend stairs with reciprocal pattern and 1 rail    Treatments:  Therapeutic Exercise:  NuStep L5 x6 minutes  Gastroc stretch at wedge, 30 seconds x 3  Stand hamstring stretch, 30 seconds x 3 ea R/L   Stand hip ABD 2x10 ea R/L  Stand hip EXT 2x10 ea R/L   FWD step ups 6inch, 2x10   Seated hip march, 2x10 ea R/L  Seated up and out 2x10 ea R/L   SLR 2x10 ea R/L   Bridge, 2x10   LTR 2x10  Bent leg fall out 2x10 ea R/L        Assessment   Assessment:   Able to progress to stair strengthening to increase closed chain tolerance of right LE with improved performance.       Plan: strength     OP EDUCATION: cont with HEP and walking    Goals:   Active       Mobility       Goal 1       Start:  02/20/25    Expected End:  05/21/25       Pt will improve right LE strength to 5/5 to improve I/ADLs.         Goal 2       Start:  02/20/25    Expected End:  05/21/25       Pt will improve right hip AROM to WNL to improve I/ADLs.            Pain       Goal 1       Start:  02/20/25    Expected End:  05/21/25       Pt will stand/walk >30 min with 0/10  pain to improve I/ADLs.         Goal 2       Start:  02/20/25    Expected End:  05/21/25       Pt will perform all hobbies with 0/10 pain.

## 2025-03-13 ENCOUNTER — APPOINTMENT (OUTPATIENT)
Dept: PHYSICAL THERAPY | Facility: CLINIC | Age: 80
End: 2025-03-13
Payer: MEDICARE

## 2025-03-18 ENCOUNTER — TREATMENT (OUTPATIENT)
Dept: PHYSICAL THERAPY | Facility: CLINIC | Age: 80
End: 2025-03-18
Payer: MEDICARE

## 2025-03-18 DIAGNOSIS — M16.11 PRIMARY OSTEOARTHRITIS OF RIGHT HIP: ICD-10-CM

## 2025-03-18 PROCEDURE — 97110 THERAPEUTIC EXERCISES: CPT | Mod: GP | Performed by: PHYSICAL THERAPIST

## 2025-03-18 NOTE — PROGRESS NOTES
Physical Therapy Treatment    Patient Name: Jessy Mcdaniel  MRN: [id number]  Today's Date: 3/18/2025  Time Calculation  Start Time: 0905  Stop Time: 0946  Time Calculation (min): 41 min  PT Therapeutic Procedures Time Entry  Therapeutic Exercise Time Entry: 41    Insurance:  Visit number: 4 of 6  Authorization info: 2025: MEDICARE A/B - NO AUTH / $257 DED MET / [place] / $0 used 2025 pt/st.  2) AARP  SUPP ACTIVE / PER RTE / ds 2/18/25.   Insurance Type: Payor: MEDICARE / Plan: MEDICARE PART A AND B / Product Type: *No Product type* /    Current Problem   1. Primary osteoarthritis of right hip  Follow Up In Physical Therapy          Subjective   General   Plans on getting recumbent bike from daughter. Would like to start doing this. Also able to go up/down kids steps yesterday without pain. Moving too quickly is when she feels a jerk in right hip. Doing exercises daily.       Precautions: none  Pain 2/10  Post Treatment Pain Level 2/10    Objective   B Gluteus Medius: 4-/5     Treatments:  Therapeutic Exercise:  NuStep L5 x6 minutes  Gastroc stretch at wedge, 30 seconds x 3  Stand hamstring stretch, 30 seconds x 3 ea R/L   Stand hip ABD 2x10 ea R/L  Stand hip EXT 2x10 ea R/L   FWD step ups 6inch, 2x10   Side steps green loop 20 feet x 4  Zig zags green loop 20 feet x 4  Seated hip march, 2x10 ea R/L  STS 2x10         Assessment   Assessment:   Able to progress to resistance band exercises with use of tband to further progress LE strengthening. Cues to prevent LE ER compensations due to glute weakness.         Plan: continue with progressions    OP EDUCATION: no change     Goals:   Active       Mobility       Goal 1       Start:  02/20/25    Expected End:  05/21/25       Pt will improve right LE strength to 5/5 to improve I/ADLs.         Goal 2       Start:  02/20/25    Expected End:  05/21/25       Pt will improve right hip AROM to WNL to improve I/ADLs.            Pain       Goal 1       Start:  02/20/25     Expected End:  05/21/25       Pt will stand/walk >30 min with 0/10 pain to improve I/ADLs.         Goal 2       Start:  02/20/25    Expected End:  05/21/25       Pt will perform all hobbies with 0/10 pain.

## 2025-03-20 ENCOUNTER — APPOINTMENT (OUTPATIENT)
Dept: PHYSICAL THERAPY | Facility: CLINIC | Age: 80
End: 2025-03-20
Payer: MEDICARE

## 2025-03-25 ENCOUNTER — TREATMENT (OUTPATIENT)
Dept: PHYSICAL THERAPY | Facility: CLINIC | Age: 80
End: 2025-03-25
Payer: MEDICARE

## 2025-03-25 DIAGNOSIS — M16.11 PRIMARY OSTEOARTHRITIS OF RIGHT HIP: ICD-10-CM

## 2025-03-25 PROCEDURE — 97110 THERAPEUTIC EXERCISES: CPT | Mod: GP | Performed by: PHYSICAL THERAPIST

## 2025-03-25 NOTE — PROGRESS NOTES
Physical Therapy Treatment    Patient Name: Jessy Mcdaniel  MRN: [id number]  Today's Date: 3/25/2025  Time Calculation  Start Time: 0940  Stop Time: 1022  Time Calculation (min): 42 min  PT Therapeutic Procedures Time Entry  Therapeutic Exercise Time Entry: 42    Insurance:  Visit number: 5 of 6  Authorization info: 2025: MEDICARE A/B - NO AUTH / $257 DED MET / [place] / $0 used 2025 pt/st.  2) [employer]  SUPP ACTIVE / PER RTE / ds 2/18/25.   Insurance Type: Payor: MEDICARE / Plan: MEDICARE PART A AND B / Product Type: *No Product type* /      Current Problem   1. Primary osteoarthritis of right hip  Follow Up In Physical Therapy          Subjective   General   Right hip sore today. Got recumbent bike and able to perform for 5 minutes, but with clicking (nonpainful) noise. Also, doing new exercises.      Precautions: none  Pain 5/10  Post Treatment Pain Level 4/10    Objective   Right hip AROM: WNL in all directions, slight limitation in right hip IR to 25% available range.     Treatments:  Therapeutic Exercise:  NuStep L5 x6 minutes  Gastroc stretch at wedge, 30 seconds x 3  Stand hamstring stretch, 30 seconds x 3 ea R/L   Stand hip ABD 2x10 ea R/L  Stand hip EXT 2x10 ea R/L   FWD step ups with high knee 6inch, 2x10   Side steps green loop 20 feet x 4  Zig zags green loop 20 feet x 4  STS 2x10       Assessment   Assessment:   Mild difficulty transitioning to high knee emphasis to increase WB in right LE, requires UE support. Would benefit from further proximal LE and hip strengthening.      Plan: reassess next session    OP EDUCATION: continue with bike    Goals:   Active       Mobility       Goal 1       Start:  02/20/25    Expected End:  05/21/25       Pt will improve right LE strength to 5/5 to improve I/ADLs.         Goal 2       Start:  02/20/25    Expected End:  05/21/25       Pt will improve right hip AROM to WNL to improve I/ADLs.            Pain       Goal 1       Start:  02/20/25    Expected End:  05/21/25        Pt will stand/walk >30 min with 0/10 pain to improve I/ADLs.         Goal 2       Start:  02/20/25    Expected End:  05/21/25       Pt will perform all hobbies with 0/10 pain.

## 2025-03-27 ENCOUNTER — APPOINTMENT (OUTPATIENT)
Dept: PHYSICAL THERAPY | Facility: CLINIC | Age: 80
End: 2025-03-27
Payer: MEDICARE

## 2025-04-01 ENCOUNTER — TREATMENT (OUTPATIENT)
Dept: PHYSICAL THERAPY | Facility: CLINIC | Age: 80
End: 2025-04-01
Payer: MEDICARE

## 2025-04-01 DIAGNOSIS — M16.11 PRIMARY OSTEOARTHRITIS OF RIGHT HIP: ICD-10-CM

## 2025-04-01 PROCEDURE — 97110 THERAPEUTIC EXERCISES: CPT | Mod: GP | Performed by: PHYSICAL THERAPIST

## 2025-04-01 NOTE — PROGRESS NOTES
"Physical Therapy Treatment/Discharge Report     Patient Name: Jessy Mcdaniel  MRN: [id number]  Today's Date: 4/1/2025  Time Calculation  Start Time: 0930  Stop Time: 1013  Time Calculation (min): 43 min  PT Therapeutic Procedures Time Entry  Therapeutic Exercise Time Entry: 43    Insurance:  Visit number: 6 of 6  Authorization info: 2025: MEDICARE A/B - NO AUTH / $257 DED MET / [place] / $0 used 2025 pt/st.  [facility] / ds 2/[facility].   Insurance Type: Payor: MEDICARE / Plan: MEDICARE PART A AND B / Product Type: *No Product type*    Current Problem   1. Primary osteoarthritis of right hip  Follow Up In Physical Therapy          Subjective   General   Able to go for a longer walker and do hill. Mild \"jerk\" feeling in right hip, but stopped immediately afterwards. Able to do stairs and sleep on right side. Doing exercises, 2x daily without difficulty.       Precautions: none  Pain 0/10  Post Treatment Pain Level 0/10    Objective   Right hip AROM: WNL, mild reduced hip IR by 25%    Right LE strength:  Hip flex 4+/5  Knee flex 5/5  Knee ext 5/5    Flexibility: Mild tightness in right hip flexors  Stairs: Ascends and descends with reciprocal pattern using 1 rail   Gait: Ambulates without noticeable deficits in clinic this date and on flat, even surfaces       Treatments:  Therapeutic Exercise:  NuStep L5 x6 minutes  Gastroc stretch at wedge, 30 seconds x 3  Stand hamstring stretch, 30 seconds x 3 ea R/L   FWD step ups with high knee 6inch, 2x10   Side steps green loop 20 feet x 4  Zig zags green loop 20 feet x 4  Mini Fwd lunge 2x10 ea R/L   Mini lat lunge 2x10 ea R/L  STS 2x10         Assessment   Assessment:   Pt with good progress during therapy and has met her goals. She has improved AROM and strength of right LE and remains with minimal to no gait deficits. She is very motivated to complete all exercises on her own. She is to be discharged at this time and continue with HEP on her own. She is " in good understanding.     Plan: discharge    OP EDUCATION: cont with HEP on own. Return to MD in May 2025    Goals:   Resolved       Mobility       Goal 1 (Met)       Start:  02/20/25    Expected End:  05/21/25    Resolved:  04/01/25    Pt will improve right LE strength to 5/5 to improve I/ADLs.         Goal 2 (Met)       Start:  02/20/25    Expected End:  05/21/25    Resolved:  04/01/25    Pt will improve right hip AROM to WNL to improve I/ADLs.            Pain       Goal 1 (Met)       Start:  02/20/25    Expected End:  05/21/25    Resolved:  04/01/25    Pt will stand/walk >30 min with 0/10 pain to improve I/ADLs.         Goal 2 (Met)       Start:  02/20/25    Expected End:  05/21/25    Resolved:  04/01/25    Pt will perform all hobbies with 0/10 pain.

## 2025-04-03 ENCOUNTER — APPOINTMENT (OUTPATIENT)
Dept: PHYSICAL THERAPY | Facility: CLINIC | Age: 80
End: 2025-04-03
Payer: MEDICARE

## 2025-05-06 NOTE — PROGRESS NOTES
Subjective      Past Surgical History:   Procedure Laterality Date    APPENDECTOMY      BI STEREOTACTIC GUIDED BREAST RIGHT LOCALIZATION AND BIOPSY Right 09/24/2014    BI STEREOTACTIC GUIDED BREAST LOCALIZATION AND BIOPSY RIGHT [facility]    BI STEREOTACTIC GUIDED BREAST RIGHT LOCALIZATION AND BIOPSY Right 11/27/2017    BI STEREOTACTIC GUIDED BREAST LOCALIZATION AND BIOPSY RIGHT [facility]    BI US GUIDED BREAST LOCALIZATION AND BIOPSY LEFT Left 12/09/2022    BI US GUIDED BREAST LOCALIZATION AND BIOPSY LEFT [facility]    BREAST CYST ASPIRATION  [id number]    CATARACT EXTRACTION W/ INTRAOCULAR LENS  IMPLANT, BILATERAL      COLON SURGERY  1992    COLONOSCOPY      HYSTERECTOMY      JOINT REPLACEMENT      KNEE ARTHROSCOPY W/ MENISCECTOMY Left     And Baker's cyst removal    KNEE SURGERY Right 03/13/2024    total    WISDOM TOOTH EXTRACTION            Patient History       This 79 year old established patient presents for reevaluation of right hip pain which she rates at 6/10. She states that this right hip pain has been present and persistent x several months. She denies any injury or trauma to her right hip. She mostly complains of right groin pain  that is worse with and aggravated by walking.  Her groin pain is what impairs her ability most to do her normal activities of daily living.She is walking independently without support  In the office but uses a cane for support when walking outside because of her groin pain. She has been discharged from outpatient physical therapy and is continuing to perform her exercises on her own at home. She is doing well s/p right knee replacement performed by Dr. Guerra on 3/13/24. She does not have any chest pain or shortness of breath      There were no vitals taken for this visit.     ROS  CARDIOLOGY:   Negative for chest pain, shortness of breath.   RESPIRATORY:   Negative for chest pain, shortness of breath.   MUSCULOSKELETAL:   See HPI for details.    NEUROLOGY:   Negative for tingling, numbness, weakness.      Physical exam: Right hip: There is  pain with and limitation of range of motion.  Right knee: incision is clean dry and well healed.  No evidence of infection, cellulitis, fluctuance or abscess. Patient has painless ROM from 0-120 degrees. Nontender in the right calf. Neurovascular is intact. Patient is seen today walking  with a painful Trendelenburg gait despite using a cane for support     AP and lateral x-rays of the right knee done and read in office previously show a right total knee replacement in good position.  X-rays of the right hip done and read in the office today show osteoarthritis of the right hip with loss of joint surface cartilage and joint irregularity.  I reviewed the x-rays listed above with the patient in the office today.    Lower extremity venous duplex right    Result Date: 3/15/2024           [place] [address]            Phone [phone]  Vascular Lab Report  [facility] US LOWER EXTREMITY VENOUS DUPLEX RIGHT Patient Name:      GIANCARLO CALZADA       Reading Physician:  [id number] Mayuri Sotelo MD, RPVI Study Date:        3/15/2024            Ordering Provider:  [id number] CHARI ZARAGOZA MRN/PID:           [id number]             Fellow: Accession#:        [id number]         Technologist:       Jazlyn Calderón RVRUPESH Date of Birth/Age: 1945 / 78 years Technologist 2: Gender:            F                    Encounter#:         [id number] Admission Status:  Inpatient            Location Performed: [facility]  Diagnosis/ICD: Localized (leg) edema-R60.0 Indication:    Limb swelling CPT Codes:     21724 Peripheral venous duplex scan for DVT Limited  Pertinent History: S/p right total knee arthroplasty 3/13/2024.  CONCLUSIONS:  Right Lower Venous: No evidence of acute deep  vein thrombus visualized in the right lower extremity. Additional Findings; Heterogeneous sturcture noted in the popliteal fossa measuring approximately 2.9 x 1.5 x 0.6 cm. Findings could be consistent with complex/ruptured Baker's cyst or hematoma. No vascularity noted. Clinical correlation and imaging follow-up is recommended. Left Lower Venous: Left common femoral vein is negative for deep vein thrombus.  Imaging & Doppler Findings:  Right                 Compressible Thrombus        Flow Distal External Iliac                None       Pulsatile CFV                       Yes        None       Pulsatile PFV                       Yes        None FV Proximal               Yes        None       Pulsatile FV Mid                    Yes        None FV Distal                 Yes        None Popliteal                 Yes        None   Spontaneous/Phasic Peroneal                  Yes        None PTV                       Yes        None  Left Compress Thrombus        Flow CFV    Yes      None   Spontaneous/Phasic  [id number] Mayuri Sotelo MD, MICHELLE Electronically signed by [id number] MICHELLE Gonsales MD on 3/15/2024 at 6:43:19 PM  ** Final **     XR knee right 1-2 views    Result Date: 3/13/2024  Interpreted By:  Kevan Lucas, STUDY: XR KNEE RIGHT 1-2 VIEWS; 3/13/2024 11:53 am   INDICATION: Signs/Symptoms:Post op knee.   COMPARISON: 01/19/2024   ACCESSION NUMBER(S): [id number]   ORDERING CLINICIAN: MAGEN DANIEL   TECHNIQUE: AP and lateral views of the right knee.   FINDINGS: Normal appearance of femoral, tibial, and retropatellar components. There are expected postoperative changes of right total knee prosthesis. Expected postoperative changes of the soft tissues. No abnormal radiopaque foreign body.       Expected postoperative changes of right total knee prosthesis.   Signed by: Kevan Lucas 3/13/2024 12:14 PM Dictation workstation:   [id number]      Jessy was seen today for pain and follow-up.  Diagnoses and  all orders for this visit:  Right hip pain (Primary)  Hip strain, right, subsequent encounter  Primary osteoarthritis of right hip    Options are discussed with the patient in detail. The patient states that she is not yet ready for right total hip replacement. She states that she wishes to continue with non operative treatment at this time.  The patient is further instructed regarding total knee infection precautions, activity modification and risk for further injury with falling or trauma and to use a cane for support, ice, provider directed at home gentle strengthening and ROM exercises, and the appropriate use of Tylenol as needed for pain with its potential adverse reactions and side effects. The patient understands. Return  as needed Please note that this report has been produced using speech recognition software.  It may contain errors related to grammar, punctuation or spelling.  Electronically signed, but not reviewed.    Teresa Crum PA-C

## 2025-05-08 ENCOUNTER — OFFICE VISIT (OUTPATIENT)
Dept: ORTHOPEDIC SURGERY | Facility: CLINIC | Age: 80
End: 2025-05-08
Payer: MEDICARE

## 2025-05-08 VITALS — HEIGHT: 65 IN | BODY MASS INDEX: 26.66 KG/M2 | WEIGHT: 160 LBS

## 2025-05-08 DIAGNOSIS — M25.551 RIGHT HIP PAIN: Primary | ICD-10-CM

## 2025-05-08 DIAGNOSIS — M16.11 PRIMARY OSTEOARTHRITIS OF RIGHT HIP: ICD-10-CM

## 2025-05-08 DIAGNOSIS — S76.011D HIP STRAIN, RIGHT, SUBSEQUENT ENCOUNTER: ICD-10-CM

## 2025-05-08 PROCEDURE — 99213 OFFICE O/P EST LOW 20 MIN: CPT | Performed by: PHYSICIAN ASSISTANT

## 2025-05-08 PROCEDURE — 1159F MED LIST DOCD IN RCRD: CPT | Performed by: PHYSICIAN ASSISTANT

## 2025-05-08 PROCEDURE — 1160F RVW MEDS BY RX/DR IN RCRD: CPT | Performed by: PHYSICIAN ASSISTANT

## 2025-05-08 PROCEDURE — 1036F TOBACCO NON-USER: CPT | Performed by: PHYSICIAN ASSISTANT

## 2025-05-08 ASSESSMENT — LIFESTYLE VARIABLES
AUDIT TOTAL SCORE: 0
HOW OFTEN DURING THE LAST YEAR HAVE YOU HAD A FEELING OF GUILT OR REMORSE AFTER DRINKING: NEVER
HOW OFTEN DO YOU HAVE SIX OR MORE DRINKS ON ONE OCCASION: NEVER
HOW OFTEN DO YOU HAVE A DRINK CONTAINING ALCOHOL: NEVER
HAVE YOU OR SOMEONE ELSE BEEN INJURED AS A RESULT OF YOUR DRINKING: NO
HAS A RELATIVE, FRIEND, DOCTOR, OR ANOTHER HEALTH PROFESSIONAL EXPRESSED CONCERN ABOUT YOUR DRINKING OR SUGGESTED YOU CUT DOWN: NO
HOW OFTEN DURING THE LAST YEAR HAVE YOU NEEDED AN ALCOHOLIC DRINK FIRST THING IN THE MORNING TO GET YOURSELF GOING AFTER A NIGHT OF HEAVY DRINKING: NEVER
HOW MANY STANDARD DRINKS CONTAINING ALCOHOL DO YOU HAVE ON A TYPICAL DAY: PATIENT DOES NOT DRINK
HOW OFTEN DURING THE LAST YEAR HAVE YOU FAILED TO DO WHAT WAS NORMALLY EXPECTED FROM YOU BECAUSE OF DRINKING: NEVER
HOW OFTEN DURING THE LAST YEAR HAVE YOU FOUND THAT YOU WERE NOT ABLE TO STOP DRINKING ONCE YOU HAD STARTED: NEVER
HOW OFTEN DURING THE LAST YEAR HAVE YOU BEEN UNABLE TO REMEMBER WHAT HAPPENED THE NIGHT BEFORE BECAUSE YOU HAD BEEN DRINKING: NEVER
AUDIT-C TOTAL SCORE: 0
SKIP TO QUESTIONS 9-10: 1

## 2025-05-08 ASSESSMENT — PAIN - FUNCTIONAL ASSESSMENT: PAIN_FUNCTIONAL_ASSESSMENT: 0-10

## 2025-05-08 ASSESSMENT — COLUMBIA-SUICIDE SEVERITY RATING SCALE - C-SSRS
1. IN THE PAST MONTH, HAVE YOU WISHED YOU WERE DEAD OR WISHED YOU COULD GO TO SLEEP AND NOT WAKE UP?: NO
2. HAVE YOU ACTUALLY HAD ANY THOUGHTS OF KILLING YOURSELF?: NO
6. HAVE YOU EVER DONE ANYTHING, STARTED TO DO ANYTHING, OR PREPARED TO DO ANYTHING TO END YOUR LIFE?: NO

## 2025-05-08 ASSESSMENT — PAIN DESCRIPTION - DESCRIPTORS: DESCRIPTORS: ACHING;JABBING

## 2025-05-08 ASSESSMENT — PAIN SCALES - GENERAL
PAINLEVEL_OUTOF10: 5 - MODERATE PAIN
PAINLEVEL_OUTOF10: 5

## 2025-05-08 ASSESSMENT — ENCOUNTER SYMPTOMS
OCCASIONAL FEELINGS OF UNSTEADINESS: 0
DEPRESSION: 0
LOSS OF SENSATION IN FEET: 0

## 2025-05-08 NOTE — PATIENT INSTRUCTIONS
Continue to rest, ice and elevate your knee.  Continue your pain regimen.     Remember to call our office for antibiotics before dental work.     Do not kneel, twist, or squat and avoid heavy lifting.   Continue PT     Return  as needed.

## 2025-05-10 DIAGNOSIS — E78.2 MIXED HYPERLIPIDEMIA: ICD-10-CM

## 2025-05-12 RX ORDER — SIMVASTATIN 20 MG/1
20 TABLET, FILM COATED ORAL NIGHTLY
Qty: 90 TABLET | Refills: 3 | Status: SHIPPED | OUTPATIENT
Start: 2025-05-12 | End: 2026-05-12

## 2025-06-16 ENCOUNTER — TELEPHONE (OUTPATIENT)
Dept: PRIMARY CARE | Facility: CLINIC | Age: 80
End: 2025-06-16

## 2025-06-16 ENCOUNTER — CLINICAL SUPPORT (OUTPATIENT)
Dept: PRIMARY CARE | Facility: CLINIC | Age: 80
End: 2025-06-16
Payer: MEDICARE

## 2025-06-16 DIAGNOSIS — M81.0 AGE-RELATED OSTEOPOROSIS WITHOUT CURRENT PATHOLOGICAL FRACTURE: ICD-10-CM

## 2025-06-16 PROCEDURE — 2500000004 HC RX 250 GENERAL PHARMACY W/ HCPCS (ALT 636 FOR OP/ED): Mod: JZ | Performed by: INTERNAL MEDICINE

## 2025-06-16 PROCEDURE — 96372 THER/PROPH/DIAG INJ SC/IM: CPT | Performed by: INTERNAL MEDICINE

## 2025-06-16 RX ADMIN — DENOSUMAB 60 MG: 60 INJECTION SUBCUTANEOUS at 09:12

## 2025-06-16 NOTE — TELEPHONE ENCOUNTER
Patient inquired if she needs to get Measles booster. Does not remember if she ever had measles or a measles immunization. Please advise.

## 2025-06-19 DIAGNOSIS — E03.9 ACQUIRED HYPOTHYROIDISM: ICD-10-CM

## 2025-06-19 RX ORDER — LEVOTHYROXINE SODIUM 50 UG/1
TABLET ORAL
Qty: 63 TABLET | Refills: 5 | Status: SHIPPED | OUTPATIENT
Start: 2025-06-19

## 2025-06-24 ENCOUNTER — OFFICE VISIT (OUTPATIENT)
Dept: PRIMARY CARE | Facility: CLINIC | Age: 80
End: 2025-06-24
Payer: MEDICARE

## 2025-06-24 VITALS
OXYGEN SATURATION: 97 % | HEART RATE: 84 BPM | DIASTOLIC BLOOD PRESSURE: 56 MMHG | WEIGHT: 166 LBS | BODY MASS INDEX: 27.62 KG/M2 | SYSTOLIC BLOOD PRESSURE: 112 MMHG

## 2025-06-24 DIAGNOSIS — I10 PRIMARY HYPERTENSION: Primary | ICD-10-CM

## 2025-06-24 DIAGNOSIS — I48.0 PAROXYSMAL ATRIAL FIBRILLATION (MULTI): ICD-10-CM

## 2025-06-24 DIAGNOSIS — Z96.651 S/P TOTAL KNEE REPLACEMENT, RIGHT: ICD-10-CM

## 2025-06-24 DIAGNOSIS — M81.0 AGE-RELATED OSTEOPOROSIS WITHOUT CURRENT PATHOLOGICAL FRACTURE: ICD-10-CM

## 2025-06-24 DIAGNOSIS — E03.9 ACQUIRED HYPOTHYROIDISM: ICD-10-CM

## 2025-06-24 DIAGNOSIS — G47.33 OBSTRUCTIVE SLEEP APNEA SYNDROME: ICD-10-CM

## 2025-06-24 DIAGNOSIS — R60.0 EDEMA OF LOWER EXTREMITY: ICD-10-CM

## 2025-06-24 DIAGNOSIS — E78.2 MIXED HYPERLIPIDEMIA: ICD-10-CM

## 2025-06-24 PROCEDURE — 99214 OFFICE O/P EST MOD 30 MIN: CPT | Performed by: INTERNAL MEDICINE

## 2025-06-24 PROCEDURE — 1159F MED LIST DOCD IN RCRD: CPT | Performed by: INTERNAL MEDICINE

## 2025-06-24 PROCEDURE — 1036F TOBACCO NON-USER: CPT | Performed by: INTERNAL MEDICINE

## 2025-06-24 PROCEDURE — 1125F AMNT PAIN NOTED PAIN PRSNT: CPT | Performed by: INTERNAL MEDICINE

## 2025-06-24 PROCEDURE — G2211 COMPLEX E/M VISIT ADD ON: HCPCS | Performed by: INTERNAL MEDICINE

## 2025-06-24 PROCEDURE — 3078F DIAST BP <80 MM HG: CPT | Performed by: INTERNAL MEDICINE

## 2025-06-24 PROCEDURE — 3074F SYST BP LT 130 MM HG: CPT | Performed by: INTERNAL MEDICINE

## 2025-06-24 ASSESSMENT — ENCOUNTER SYMPTOMS
FREQUENCY: 1
OCCASIONAL FEELINGS OF UNSTEADINESS: 0
ARTHRALGIAS: 1
PSYCHIATRIC NEGATIVE: 1
DIARRHEA: 0
HEMATURIA: 0
DEPRESSION: 0
CONSTITUTIONAL NEGATIVE: 1
SHORTNESS OF BREATH: 0
ACTIVITY CHANGE: 0
MYALGIAS: 1
PALPITATIONS: 0
ABDOMINAL PAIN: 0
CONSTIPATION: 1
DYSURIA: 1
COUGH: 0
LOSS OF SENSATION IN FEET: 0

## 2025-06-24 ASSESSMENT — PAIN SCALES - GENERAL: PAINLEVEL_OUTOF10: 4

## 2025-06-24 NOTE — PROGRESS NOTES
Subjective   Patient ID: Jessy Mcdaniel is a 79 y.o. female who presents for 4 month follow up .    Hypertension-compliant and stable on dyazide. BP Readings from Last 3 Encounters:  06/24/25 : 112/56  02/19/25 : 112/60  10/16/24 : 122/68       Atrial Fibrillation--stable on meds-remains on eliquis. Managed by Dr Aguila    Hyperlipidemia-stable and compliant on meds. Taking simvastatin. Coronary calcium score 0 in 2022.Lab Results       Component                Value               Date                       LDLCALC                  69                  02/20/2025               Hypothyroid-stable on levothyroxine- Lab Results       Component                Value               Date                       TSH                      4.65 (H)            02/20/2025               Sleep apnea--uses CPAP nightly with benefit    Osteoporosis-receiving prolia-6/2025-last DEXA 12/2024 stable    Exercise-doing short walks when weather allows, uses basement steps when weather badnn      Diet -eats healthy-has decreased portions, increasing fruit    Dr Guerra-R hip pain continues-xray shows arthritis-next step will be hip replacement-delaying til after trip to [facility]. Completed PT and doing exercises at home             Review of Systems   Constitutional: Negative.  Negative for activity change.   Respiratory:  Negative for cough and shortness of breath.    Cardiovascular:  Positive for leg swelling (right leg below knee since surg-neg US--now wearing compression stockings). Negative for chest pain and palpitations.   Gastrointestinal:  Positive for constipation (discussed miralax). Negative for abdominal pain and diarrhea.   Genitourinary:  Positive for dysuria, frequency and urgency. Negative for hematuria.   Musculoskeletal:  Positive for arthralgias and myalgias.        R hip pain   Skin:  Negative for rash.   Psychiatric/Behavioral: Negative.          Looking forward to seeing her son       Objective   /56 (BP  Location: Left arm, Patient Position: Sitting)   Pulse 84 Comment: coffee  Wt 75.3 kg (166 lb)   LMP  (LMP Unknown)   SpO2 97%   BMI 27.62 kg/m²     Physical Exam  Constitutional:       General: She is not in acute distress.     Appearance: Normal appearance.   Cardiovascular:      Rate and Rhythm: Normal rate and regular rhythm.      Heart sounds: Normal heart sounds. No murmur heard.     No gallop.   Pulmonary:      Breath sounds: Normal breath sounds. No wheezing, rhonchi or rales.   Musculoskeletal:      Right lower leg: Edema (compression stocking in place and no edema) present.      Left lower leg: No edema.   Skin:     General: Skin is warm and dry.      Findings: No rash.   Neurological:      General: No focal deficit present.      Mental Status: She is alert and oriented to person, place, and time.   Psychiatric:         Mood and Affect: Mood normal.         Assessment/Plan will continue regimen  Diagnoses and all orders for this visit:  Primary hypertension  Paroxysmal atrial fibrillation (Multi)  Mixed hyperlipidemia  Acquired hypothyroidism  Age-related osteoporosis without current pathological fracture  S/P total knee replacement, right  Obstructive sleep apnea syndrome  Edema of lower extremity    Current Medications[1]           [1]   Current Outpatient Medications:     apixaban (Eliquis) 5 mg tablet, TAKE 1 TABLET BY MOUTH TWICE A DAY, Disp: 180 tablet, Rfl: 1    estradioL 10 mcg insert, Insert 10 mcg into the vagina 2 times a week., Disp: 30 each, Rfl: 3    simvastatin (Zocor) 20 mg tablet, TAKE 1 TABLET (20 MG) BY MOUTH ONCE DAILY AT BEDTIME., Disp: 90 tablet, Rfl: 3    Synthroid 50 mcg tablet, TAKE 1 TABLET BY MOUTH EVERY OTHER DAY, ALTERNATING WITH 1/2 TABLET EVERY OTHER DAY, Disp: 63 tablet, Rfl: 5    triamterene-hydrochlorothiazid (Maxzide-25) 37.5-25 mg tablet, TAKE 0.5 TABLETS BY MOUTH ONCE DAILY IN THE MORNING., Disp: 45 tablet, Rfl: 1    Current Facility-Administered Medications:      denosumab (Prolia) injection 60 mg, 60 mg, subcutaneous, q6 months, Ysabel Varela MD, 60 mg at 06/16/25 0957

## 2025-06-25 DIAGNOSIS — I49.1 PAC (PREMATURE ATRIAL CONTRACTION): ICD-10-CM

## 2025-06-25 DIAGNOSIS — I48.0 PAROXYSMAL ATRIAL FIBRILLATION (MULTI): ICD-10-CM

## 2025-06-25 RX ORDER — APIXABAN 5 MG/1
5 TABLET, FILM COATED ORAL 2 TIMES DAILY
Qty: 180 TABLET | Refills: 0 | Status: SHIPPED | OUTPATIENT
Start: 2025-06-25

## 2025-06-30 ENCOUNTER — TELEPHONE (OUTPATIENT)
Dept: ORTHOPEDIC SURGERY | Facility: CLINIC | Age: 80
End: 2025-06-30
Payer: MEDICARE

## 2025-07-09 NOTE — PROGRESS NOTES
Subjective      Past Surgical History:   Procedure Laterality Date    APPENDECTOMY      BI STEREOTACTIC GUIDED BREAST RIGHT LOCALIZATION AND BIOPSY Right 09/24/2014    BI STEREOTACTIC GUIDED BREAST LOCALIZATION AND BIOPSY RIGHT [facility]    BI STEREOTACTIC GUIDED BREAST RIGHT LOCALIZATION AND BIOPSY Right 11/27/2017    BI STEREOTACTIC GUIDED BREAST LOCALIZATION AND BIOPSY RIGHT [facility]    BI US GUIDED BREAST LOCALIZATION AND BIOPSY LEFT Left 12/09/2022    BI US GUIDED BREAST LOCALIZATION AND BIOPSY LEFT [facility]    BREAST CYST ASPIRATION  [id number]    CATARACT EXTRACTION W/ INTRAOCULAR LENS  IMPLANT, BILATERAL      COLON SURGERY  1992    COLONOSCOPY      HYSTERECTOMY      JOINT REPLACEMENT      KNEE ARTHROSCOPY W/ MENISCECTOMY Left     And Baker's cyst removal    KNEE SURGERY Right 03/13/2024    total    WISDOM TOOTH EXTRACTION            Patient History       This 79 year old established patient presents for reevaluation of right hip pain which she rates at 9/10. She states that this right hip pain has been present and persistent x many months. She denies any injury or trauma to her right hip. She mostly complains of right hip and groin pain  that is worse with and aggravated by activities of daily living including walking.  Her hip and groin pain impairs her ability most to do her normal activities of daily living.She is walking independently without support  In the office but uses a cane for support when walking outside because of her groin pain and feelings of right lower extremity and gait instability. She has been discharged from outpatient physical therapy and is continuing to perform her exercises on her own at home. She is doing well s/p right knee replacement performed by Dr. Guerra on 3/13/24 and has absolutely no complaints of right knee pain. She does not have any chest pain or shortness of breath  She presents to the office today to discuss surgery set up for right  hip    Primary joint affected: right hip     Duration of symptoms: years    Joint replacement related history:  Osteoarthritis Severe  Avascular necrosis: Yes    Failed nonsurgical treatment:   NSAID/ COXIB: Yes  Weight loss: Yes  Physical therapy: Yes  Intra-articular injection: No  Braces, orthotics, or assistive devices: Yes    Radiological indications for replacement:   Subchondral cyst: Yes  Subchondral sclerosis: Yes  Periarticular osteophytes: Yes  Joint subluxation: Yes  Joint space narrowing: Yes    Highest level of walking support:   Cane, wheelchair or walker    Pain History:   Pain at rest: Severe  Pain when weigth bearing: Severe  Pain with passive ROM: Severe  Pain related ADL limitation: Severe  Abnormal findings on physical exam: Severe    Ability to walk without significant pain:  Household ambulator or less than 1 block    Is the patients current pain regimen controlling their joint pain? No        There were no vitals taken for this visit.     ROS  CARDIOLOGY:   Negative for chest pain, shortness of breath.   RESPIRATORY:   Negative for chest pain, shortness of breath.   MUSCULOSKELETAL:   See HPI for details.   NEUROLOGY:   Negative for tingling, numbness, weakness.      Physical exam: Right hip: There is  pain with and limitation of range of motion including pain with and limitation of abduction, adduction, internal and external rotation.  Right knee: incision is clean dry and well healed.  No evidence of infection. Patient has painless ROM from 0-120 degrees. Nontender in the right calf. Neurovascular is intact. Patient is seen today walking  with a painful Trendelenburg gait despite using a cane for support     AP and lateral x-rays of the right knee done and read in office previously show a right total knee replacement in good position.  X-rays of the right hip done and read in the office today show  severeosteoarthritis of the right hip with loss of joint surface cartilage, sclerosis,  spurring and joint irregularity.  I reviewed the x-rays listed above with the patient in the office today.   I reviewed the x-ray studies listed above as well as imaging studies listed below with the patient in the office today.  Lower extremity venous duplex right    Result Date: 3/15/2024           [address] [address]            Phone [phone]  Vascular Lab Report  [facility] US LOWER EXTREMITY VENOUS DUPLEX RIGHT Patient Name:      GIANCARLO CALZADA       Reading Physician:  [id number] Mayuri Sotelo MD, RPVI Study Date:        3/15/2024            Ordering Provider:  [id number] CHARI ZARAGOZA MRN/PID:           [id number]             Fellow: Accession#:        [id number]         Technologist:       Jazlyn Calderón RVRUPESH Date of Birth/Age: 1945 / 78 years Technologist 2: Gender:            F                    Encounter#:         [id number] Admission Status:  Inpatient            Location Performed: [facility]  Diagnosis/ICD: Localized (leg) edema-R60.0 Indication:    Limb swelling CPT Codes:     95732 Peripheral venous duplex scan for DVT Limited  Pertinent History: S/p right total knee arthroplasty 3/13/2024.  CONCLUSIONS:  Right Lower Venous: No evidence of acute deep vein thrombus visualized in the right lower extremity. Additional Findings; Heterogeneous sturcture noted in the popliteal fossa measuring approximately 2.9 x 1.5 x 0.6 cm. Findings could be consistent with complex/ruptured Baker's cyst or hematoma. No vascularity noted. Clinical correlation and imaging follow-up is recommended. Left Lower Venous: Left common femoral vein is negative for deep vein thrombus.  Imaging & Doppler Findings:  Right                 Compressible Thrombus        Flow Distal External Iliac                None       Pulsatile CFV                       Yes         None       Pulsatile PFV                       Yes        None FV Proximal               Yes        None       Pulsatile FV Mid                    Yes        None FV Distal                 Yes        None Popliteal                 Yes        None   Spontaneous/Phasic Peroneal                  Yes        None PTV                       Yes        None  Left Compress Thrombus        Flow CFV    Yes      None   Spontaneous/Phasic  [id number] MICHELLE Gonsales MD Electronically signed by [id number] MICHELLE Gonsales MD on 3/15/2024 at 6:43:19 PM  ** Final **     XR knee right 1-2 views    Result Date: 3/13/2024  Interpreted By:  Kevan Lucas, STUDY: XR KNEE RIGHT 1-2 VIEWS; 3/13/2024 11:53 am   INDICATION: Signs/Symptoms:Post op knee.   COMPARISON: 01/19/2024   ACCESSION NUMBER(S): [id number]   ORDERING CLINICIAN: MAGEN DANIEL   TECHNIQUE: AP and lateral views of the right knee.   FINDINGS: Normal appearance of femoral, tibial, and retropatellar components. There are expected postoperative changes of right total knee prosthesis. Expected postoperative changes of the soft tissues. No abnormal radiopaque foreign body.       Expected postoperative changes of right total knee prosthesis.   Signed by: Kevan Lucas 3/13/2024 12:14 PM Dictation workstation:   [id number]      Jessy was seen today for pain.  Diagnoses and all orders for this visit:  Right hip pain (Primary)  -     XR hip right with pelvis when performed 2 or 3 views; Future  Hip strain, right, subsequent encounter  -     XR hip right with pelvis when performed 2 or 3 views; Future  Primary osteoarthritis of right hip  -     Case Request Operating Room: ARTHROPLASTY, HIP, TOTAL, POSTERIOR APPROACH; Standing  -     Place in outpatient/hospital ambulatory surgery; Standing  -     Urinalysis with Reflex Culture and Microscopic; Future  -     Staphylococcus aureus/MRSA colonization, Culture; Future  -     NPO Diet Except: Sips with meds; Effective now;  Standing  -     Height and weight; Standing  -     Insert and maintain peripheral IV; Standing  -     Saline lock IV; Standing  -     Type And Screen; Standing  -     Inpatient consult to Respiratory Care; Standing  -     Vital Signs; Standing  -     povidone-iodine 5 % kit kit  -     ceFAZolin (Ancef) 2 g in dextrose (iso)  mL  -     Case Request Operating Room: ARTHROPLASTY, HIP, TOTAL, POSTERIOR APPROACH  -     XR hip right with pelvis when performed 2 or 3 views; Future  Pain, unspecified  -     Urinalysis with Reflex Culture and Microscopic; Future  -     XR hip right with pelvis when performed 2 or 3 views; Future  Gait instability    options are discussed with the patient in detail. The patient is instructed regarding continuing activity modification and fall precautions and risk for further injury with falling or trauma and use of a cane for balance and support, gentle range of motion and stretching exercises as instructed in physical therapy, and the appropriate use of Tylenol as needed for pain with its potential adverse reactions and side effects. The patient understands. The patient states that despite all treatment listed above including physical therapy, gentle strengthening and ROM exercises, and cortisone injections to the right hip that the right hip pain is disabling and impairs the patient's ability to complete normal activities of daily living including the patient's work at her job. The patient is concerned regarding risk for further injury from this persistent right hip instability, and walks with a Trendelenburg gait despite use of a cane. On Physical examination there is pain with and marked limitation of range of motion, especially internal rotation, abduction, and adduction. X- rays show severe osteoarthritis of the right hip with complete loss of joint surface cartilage, bone on bone, sclerosis and degenerative cysts.  The patient requests a discussion of further options including  surgical options. Right total hip replacement with indications, alternatives, potential risks including but not limited to risk of infection, blood clot, blood loss, dislocation, nerve or blood vessel damage, stroke, or death, benefits, unforeseen risks, the rehab involved and the fact that no guarantee can be made were all discussed with the patient in detail. The patient understands, accepts and because the right hip pain is disabling and impairs the ability to do the activities of daily living that are important to the patient and because of the persistent right hip instability the patient wishes to proceed with a right hip replacement. I agree. We will be setting this up for a time that is convenient to the patient and as the schedule allows.  Please note that this report has been produced using speech recognition software.  It may contain errors related to grammar, punctuation or spelling.  Electronically signed, but not reviewed.  Nic Guerra MD

## 2025-07-11 ENCOUNTER — HOSPITAL ENCOUNTER (OUTPATIENT)
Dept: RADIOLOGY | Facility: CLINIC | Age: 80
Discharge: HOME | End: 2025-07-11
Payer: MEDICARE

## 2025-07-11 ENCOUNTER — OFFICE VISIT (OUTPATIENT)
Dept: ORTHOPEDIC SURGERY | Facility: CLINIC | Age: 80
End: 2025-07-11
Payer: MEDICARE

## 2025-07-11 VITALS — WEIGHT: 166 LBS | HEIGHT: 65 IN | BODY MASS INDEX: 27.66 KG/M2

## 2025-07-11 DIAGNOSIS — M25.551 RIGHT HIP PAIN: ICD-10-CM

## 2025-07-11 DIAGNOSIS — M16.11 PRIMARY OSTEOARTHRITIS OF RIGHT HIP: ICD-10-CM

## 2025-07-11 DIAGNOSIS — M25.551 RIGHT HIP PAIN: Primary | ICD-10-CM

## 2025-07-11 DIAGNOSIS — R52 PAIN, UNSPECIFIED: ICD-10-CM

## 2025-07-11 DIAGNOSIS — S76.011D HIP STRAIN, RIGHT, SUBSEQUENT ENCOUNTER: ICD-10-CM

## 2025-07-11 DIAGNOSIS — R26.81 GAIT INSTABILITY: ICD-10-CM

## 2025-07-11 PROCEDURE — 1036F TOBACCO NON-USER: CPT | Performed by: ORTHOPAEDIC SURGERY

## 2025-07-11 PROCEDURE — 73502 X-RAY EXAM HIP UNI 2-3 VIEWS: CPT | Mod: RT

## 2025-07-11 PROCEDURE — 1125F AMNT PAIN NOTED PAIN PRSNT: CPT | Performed by: ORTHOPAEDIC SURGERY

## 2025-07-11 PROCEDURE — 73502 X-RAY EXAM HIP UNI 2-3 VIEWS: CPT | Mod: RIGHT SIDE | Performed by: ORTHOPAEDIC SURGERY

## 2025-07-11 PROCEDURE — 1160F RVW MEDS BY RX/DR IN RCRD: CPT | Performed by: ORTHOPAEDIC SURGERY

## 2025-07-11 PROCEDURE — 1159F MED LIST DOCD IN RCRD: CPT | Performed by: ORTHOPAEDIC SURGERY

## 2025-07-11 PROCEDURE — 99214 OFFICE O/P EST MOD 30 MIN: CPT | Performed by: ORTHOPAEDIC SURGERY

## 2025-07-11 PROCEDURE — 99212 OFFICE O/P EST SF 10 MIN: CPT | Performed by: ORTHOPAEDIC SURGERY

## 2025-07-11 RX ORDER — CEFAZOLIN SODIUM 2 G/100ML
2 INJECTION, SOLUTION INTRAVENOUS ONCE
OUTPATIENT
Start: 2025-07-11 | End: 2025-07-11

## 2025-07-11 ASSESSMENT — PAIN DESCRIPTION - DESCRIPTORS: DESCRIPTORS: STABBING;ACHING

## 2025-07-11 ASSESSMENT — PAIN - FUNCTIONAL ASSESSMENT
PAIN_FUNCTIONAL_ASSESSMENT: 0-10
PAIN_FUNCTIONAL_ASSESSMENT: 0-10

## 2025-07-11 ASSESSMENT — ENCOUNTER SYMPTOMS
OCCASIONAL FEELINGS OF UNSTEADINESS: 0
DEPRESSION: 0
LOSS OF SENSATION IN FEET: 0

## 2025-07-11 ASSESSMENT — PAIN SCALES - GENERAL
PAINLEVEL_OUTOF10: 9
PAINLEVEL_OUTOF10: 9

## 2025-07-15 ENCOUNTER — OFFICE VISIT (OUTPATIENT)
Facility: CLINIC | Age: 80
End: 2025-07-15
Payer: MEDICARE

## 2025-07-15 VITALS
BODY MASS INDEX: 27.66 KG/M2 | SYSTOLIC BLOOD PRESSURE: 113 MMHG | HEIGHT: 65 IN | OXYGEN SATURATION: 99 % | WEIGHT: 166 LBS | HEART RATE: 122 BPM | DIASTOLIC BLOOD PRESSURE: 76 MMHG

## 2025-07-15 DIAGNOSIS — I48.0 PAROXYSMAL ATRIAL FIBRILLATION (MULTI): ICD-10-CM

## 2025-07-15 DIAGNOSIS — I48.3 TYPICAL ATRIAL FLUTTER (MULTI): Primary | ICD-10-CM

## 2025-07-15 LAB
ATRIAL RATE: 242 BPM
P AXIS: 244 DEGREES
P OFFSET: 196 MS
P ONSET: 98 MS
Q ONSET: 202 MS
QRS COUNT: 20 BEATS
QRS DURATION: 94 MS
QT INTERVAL: 260 MS
QTC CALCULATION(BAZETT): 369 MS
QTC FREDERICIA: 328 MS
R AXIS: 76 DEGREES
T AXIS: 55 DEGREES
T OFFSET: 332 MS
VENTRICULAR RATE: 121 BPM

## 2025-07-15 PROCEDURE — 1036F TOBACCO NON-USER: CPT | Performed by: INTERNAL MEDICINE

## 2025-07-15 PROCEDURE — 1126F AMNT PAIN NOTED NONE PRSNT: CPT | Performed by: INTERNAL MEDICINE

## 2025-07-15 PROCEDURE — 99214 OFFICE O/P EST MOD 30 MIN: CPT | Performed by: INTERNAL MEDICINE

## 2025-07-15 PROCEDURE — 99212 OFFICE O/P EST SF 10 MIN: CPT

## 2025-07-15 PROCEDURE — 3078F DIAST BP <80 MM HG: CPT | Performed by: INTERNAL MEDICINE

## 2025-07-15 PROCEDURE — G2211 COMPLEX E/M VISIT ADD ON: HCPCS | Performed by: INTERNAL MEDICINE

## 2025-07-15 PROCEDURE — 93005 ELECTROCARDIOGRAM TRACING: CPT | Performed by: INTERNAL MEDICINE

## 2025-07-15 PROCEDURE — 3074F SYST BP LT 130 MM HG: CPT | Performed by: INTERNAL MEDICINE

## 2025-07-15 RX ORDER — FLECAINIDE ACETATE 50 MG/1
50 TABLET ORAL 2 TIMES DAILY
Qty: 60 TABLET | Refills: 11 | Status: SHIPPED | OUTPATIENT
Start: 2025-07-15 | End: 2026-07-15

## 2025-07-15 ASSESSMENT — ENCOUNTER SYMPTOMS
OCCASIONAL FEELINGS OF UNSTEADINESS: 0
LOSS OF SENSATION IN FEET: 0
DEPRESSION: 0

## 2025-07-15 ASSESSMENT — COLUMBIA-SUICIDE SEVERITY RATING SCALE - C-SSRS
1. IN THE PAST MONTH, HAVE YOU WISHED YOU WERE DEAD OR WISHED YOU COULD GO TO SLEEP AND NOT WAKE UP?: NO
6. HAVE YOU EVER DONE ANYTHING, STARTED TO DO ANYTHING, OR PREPARED TO DO ANYTHING TO END YOUR LIFE?: NO
2. HAVE YOU ACTUALLY HAD ANY THOUGHTS OF KILLING YOURSELF?: NO

## 2025-07-15 ASSESSMENT — LIFESTYLE VARIABLES: TOTAL SCORE: 0

## 2025-07-15 ASSESSMENT — PAIN SCALES - GENERAL: PAINLEVEL_OUTOF10: 0-NO PAIN

## 2025-07-15 NOTE — H&P (VIEW-ONLY)
No chief complaint on file.       The patient is an 79-year-old female with a history of hypertension and paroxysmal atrial fibrillation.  She is post catheter-based therapy about 6 years ago.  During her last visit we discontinued her flecainide as she was doing quite well.  She was previously on amiodarone but had complications related to that.  She is seeing me today in 6-month follow-up.  She sees me today noting that with exertion her heart rate will often race and in fact she is in atrial flutter today with 2-1 conduction.  Importantly she is scheduled for hip replacement on August 20.  She has not had atrial fibrillation recurrence.           Active Ambulatory Problems     Diagnosis Date Noted    Primary hypertension 04/25/2023    Hyperlipidemia 08/25/2023    Hypothyroidism 08/25/2023    Obstructive sleep apnea syndrome 08/25/2023    Osteoporosis 08/25/2023    PAC (premature atrial contraction) 08/25/2023    Atrophic vaginitis 08/25/2023    Tear of medial meniscus of knee 02/09/2009    Tear of lateral cartilage or meniscus of knee, current 02/09/2009    Right hip pain 07/12/2010    Nuclear sclerotic cataract 08/07/2018    Nuclear sclerosis 08/07/2018    Atrial fibrillation (Multi) 10/15/2018    Right knee pain 10/26/2023    Left knee pain 10/26/2023    Osteoarthritis of right knee 10/26/2023    Other instability, right knee 01/19/2024    Chronic pain of right knee 03/13/2024    Primary osteoarthritis of right knee 03/13/2024    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    Left foot pain 08/21/2023    S/P total knee replacement, right 04/05/2024    Edema of lower extremity 04/15/2024    Osteoarthritis of left knee 07/18/2024    Hip strain, right, subsequent encounter 07/18/2024    Primary osteoarthritis of right hip 07/18/2024    Gait instability 02/06/2025    Right groin pain 02/06/2025    Pain, unspecified 07/11/2025     Resolved Ambulatory Problems     Diagnosis Date Noted    Abnormal laboratory test  result 08/25/2023    Atypical atrial flutter 08/25/2023    Dysuria 04/02/2024    Intertriginous candidiasis 04/02/2024    Rash 04/02/2024    Urinary tract infection 12/07/2022     Past Medical History:   Diagnosis Date    Atrial fibrillation and flutter     Bilateral knee pain     Colon cancer (Multi)     Disease of thyroid gland     GERD (gastroesophageal reflux disease)     High cholesterol     Hx antineoplastic chemo 1992    Hypertension     Leg edema, right     Osteoarthritis     Premature atrial contraction     Sleep apnea         Review of Systems   All other systems reviewed and are negative.         Current Outpatient Medications   Medication Instructions    Eliquis 5 mg, oral, 2 times daily    estradioL 10 mcg, vaginal, 2 times weekly    simvastatin (ZOCOR) 20 mg, oral, Nightly    Synthroid 50 mcg tablet TAKE 1 TABLET BY MOUTH EVERY OTHER DAY, ALTERNATING WITH 1/2 TABLET EVERY OTHER DAY    triamterene-hydrochlorothiazid (Maxzide-25) 37.5-25 mg tablet 0.5 tablets, oral, Every morning       Objective     Vitals:    07/15/25 0905   SpO2: 99%        Vitals and nursing note reviewed.   Constitutional:       Appearance: Healthy appearance.   HENT:    Mouth/Throat:      Pharynx: Oropharynx is clear.   Pulmonary:      Effort: Pulmonary effort is normal.      Breath sounds: Normal breath sounds.   Cardiovascular:      PMI at left midclavicular line. Tachycardia present. Regular rhythm. Normal S1. Normal S2.       Murmurs: There is a grade 1/6 holosystolic murmur.      No gallop.  No click. No rub.   Pulses:     Intact distal pulses.   Edema:     Peripheral edema absent.   Abdominal:      General: Bowel sounds are normal.   Musculoskeletal:      Cervical back: Normal range of motion. Skin:     General: Skin is warm and dry.   Neurological:      General: No focal deficit present.      Mental Status: Alert and oriented to person, place and time.            Lab Review:   No visits with results within 2 Month(s) from  this visit.   Latest known visit with results is:   Office Visit on 02/19/2025   Component Date Value    POC Color, Urine 02/19/2025 Yellow     POC Appearance, Urine 02/19/2025 Cloudy (A)     POC Glucose, Urine 02/19/2025 NEGATIVE     POC Bilirubin, Urine 02/19/2025 NEGATIVE     POC Ketones, Urine 02/19/2025 NEGATIVE     POC Specific Gravity, Ur* 02/19/2025 1.020     POC Blood, Urine 02/19/2025 NEGATIVE     POC PH, Urine 02/19/2025 7.0     POC Protein, Urine 02/19/2025 NEGATIVE     POC Urobilinogen, Urine 02/19/2025 0.2     Poc Nitrite, Urine 02/19/2025 POSITIVE (A)     POC Leukocytes, Urine 02/19/2025 SMALL (1+) (A)     GLUCOSE 02/20/2025 93     UREA NITROGEN (BUN) 02/20/2025 26 (H)     CREATININE 02/20/2025 0.86     EGFR 02/20/2025 68     SODIUM 02/20/2025 138     POTASSIUM 02/20/2025 4.6     CHLORIDE 02/20/2025 100     CARBON DIOXIDE 02/20/2025 30     ELECTROLYTE BALANCE 02/20/2025 8     CALCIUM 02/20/2025 10.1     PROTEIN, TOTAL 02/20/2025 7.5     ALBUMIN 02/20/2025 4.6     BILIRUBIN, TOTAL 02/20/2025 0.5     ALKALINE PHOSPHATASE 02/20/2025 61     AST 02/20/2025 14     ALT 02/20/2025 7     TSH W/REFLEX TO FT4 02/20/2025 4.65 (H)     T4, FREE 02/20/2025 1.4     CHOLESTEROL, TOTAL 02/20/2025 153     HDL CHOLESTEROL 02/20/2025 64     TRIGLYCERIDES 02/20/2025 112     LDL-CHOLESTEROL 02/20/2025 69     CHOL/HDLC RATIO 02/20/2025 2.4     NON HDL CHOLESTEROL 02/20/2025 89        [id number] Score  Age >= 75: 2   Sex Female: 1   CHF History No: 0   HTN Yes: 1   Stroke/TIA/Thromboembolism No: 0   Vascular Dz: CAD/PAD/Aortic Plaque No: 0   DM No: 0   Total Score 4        ECG:  Atrial flutter with 2 1 conduction heart rate 121 bpm QRS duration 94 ms QTc 370 ms    Assessment/plan:  Paroxysmal atrial fibrillation and Atrial Flutter  The patient never had a manifestation of atrial flutter before.  Her ablation was only for atrial fibrillation 6 years ago.  I would reinitiate her flecainide.  Continue anticoagulation and plan  for catheter-based therapy.  Once we achieve sinus rhythm she should continue her Eliquis for at least 3 weeks prior to discontinuation before her hip surgery.  Patient be no cardiac contraindications to her planned and necessary procedure with hip surgery although again I would ensure that she is on her blood thinner for at least 3 weeks post the restoration of sinus rhythm.  Problem List Items Addressed This Visit       Atrial fibrillation (Multi)    Relevant Orders    ECG 12 lead (Clinic Performed)      Duglas Aguila MD

## 2025-07-15 NOTE — ASSESSMENT & PLAN NOTE
Paroxysmal atrial fibrillation and Atrial Flutter  The patient never had a manifestation of atrial flutter before.  Her ablation was only for atrial fibrillation 6 years ago.  I would reinitiate her flecainide.  Continue anticoagulation and plan for catheter-based therapy.  Once we achieve sinus rhythm she should continue her Eliquis for at least 3 weeks prior to discontinuation before her hip surgery.  Patient be no cardiac contraindications to her planned and necessary procedure with hip surgery although again I would ensure that she is on her blood thinner for at least 3 weeks post the restoration of sinus rhythm.

## 2025-07-15 NOTE — PROGRESS NOTES
No chief complaint on file.       The patient is an 79-year-old female with a history of hypertension and paroxysmal atrial fibrillation.  She is post catheter-based therapy about 6 years ago.  During her last visit we discontinued her flecainide as she was doing quite well.  She was previously on amiodarone but had complications related to that.  She is seeing me today in 6-month follow-up.  She sees me today noting that with exertion her heart rate will often race and in fact she is in atrial flutter today with 2-1 conduction.  Importantly she is scheduled for hip replacement on August 20.  She has not had atrial fibrillation recurrence.           Active Ambulatory Problems     Diagnosis Date Noted    Primary hypertension 04/25/2023    Hyperlipidemia 08/25/2023    Hypothyroidism 08/25/2023    Obstructive sleep apnea syndrome 08/25/2023    Osteoporosis 08/25/2023    PAC (premature atrial contraction) 08/25/2023    Atrophic vaginitis 08/25/2023    Tear of medial meniscus of knee 02/09/2009    Tear of lateral cartilage or meniscus of knee, current 02/09/2009    Right hip pain 07/12/2010    Nuclear sclerotic cataract 08/07/2018    Nuclear sclerosis 08/07/2018    Atrial fibrillation (Multi) 10/15/2018    Right knee pain 10/26/2023    Left knee pain 10/26/2023    Osteoarthritis of right knee 10/26/2023    Other instability, right knee 01/19/2024    Chronic pain of right knee 03/13/2024    Primary osteoarthritis of right knee 03/13/2024    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    Left foot pain 08/21/2023    S/P total knee replacement, right 04/05/2024    Edema of lower extremity 04/15/2024    Osteoarthritis of left knee 07/18/2024    Hip strain, right, subsequent encounter 07/18/2024    Primary osteoarthritis of right hip 07/18/2024    Gait instability 02/06/2025    Right groin pain 02/06/2025    Pain, unspecified 07/11/2025     Resolved Ambulatory Problems     Diagnosis Date Noted    Abnormal laboratory test  result 08/25/2023    Atypical atrial flutter 08/25/2023    Dysuria 04/02/2024    Intertriginous candidiasis 04/02/2024    Rash 04/02/2024    Urinary tract infection 12/07/2022     Past Medical History:   Diagnosis Date    Atrial fibrillation and flutter     Bilateral knee pain     Colon cancer (Multi)     Disease of thyroid gland     GERD (gastroesophageal reflux disease)     High cholesterol     Hx antineoplastic chemo 1992    Hypertension     Leg edema, right     Osteoarthritis     Premature atrial contraction     Sleep apnea         Review of Systems   All other systems reviewed and are negative.         Current Outpatient Medications   Medication Instructions    Eliquis 5 mg, oral, 2 times daily    estradioL 10 mcg, vaginal, 2 times weekly    simvastatin (ZOCOR) 20 mg, oral, Nightly    Synthroid 50 mcg tablet TAKE 1 TABLET BY MOUTH EVERY OTHER DAY, ALTERNATING WITH 1/2 TABLET EVERY OTHER DAY    triamterene-hydrochlorothiazid (Maxzide-25) 37.5-25 mg tablet 0.5 tablets, oral, Every morning       Objective     Vitals:    07/15/25 0905   SpO2: 99%        Vitals and nursing note reviewed.   Constitutional:       Appearance: Healthy appearance.   HENT:    Mouth/Throat:      Pharynx: Oropharynx is clear.   Pulmonary:      Effort: Pulmonary effort is normal.      Breath sounds: Normal breath sounds.   Cardiovascular:      PMI at left midclavicular line. Tachycardia present. Regular rhythm. Normal S1. Normal S2.       Murmurs: There is a grade 1/6 holosystolic murmur.      No gallop.  No click. No rub.   Pulses:     Intact distal pulses.   Edema:     Peripheral edema absent.   Abdominal:      General: Bowel sounds are normal.   Musculoskeletal:      Cervical back: Normal range of motion. Skin:     General: Skin is warm and dry.   Neurological:      General: No focal deficit present.      Mental Status: Alert and oriented to person, place and time.            Lab Review:   No visits with results within 2 Month(s) from  this visit.   Latest known visit with results is:   Office Visit on 02/19/2025   Component Date Value    POC Color, Urine 02/19/2025 Yellow     POC Appearance, Urine 02/19/2025 Cloudy (A)     POC Glucose, Urine 02/19/2025 NEGATIVE     POC Bilirubin, Urine 02/19/2025 NEGATIVE     POC Ketones, Urine 02/19/2025 NEGATIVE     POC Specific Gravity, Ur* 02/19/2025 1.020     POC Blood, Urine 02/19/2025 NEGATIVE     POC PH, Urine 02/19/2025 7.0     POC Protein, Urine 02/19/2025 NEGATIVE     POC Urobilinogen, Urine 02/19/2025 0.2     Poc Nitrite, Urine 02/19/2025 POSITIVE (A)     POC Leukocytes, Urine 02/19/2025 SMALL (1+) (A)     GLUCOSE 02/20/2025 93     UREA NITROGEN (BUN) 02/20/2025 26 (H)     CREATININE 02/20/2025 0.86     EGFR 02/20/2025 68     SODIUM 02/20/2025 138     POTASSIUM 02/20/2025 4.6     CHLORIDE 02/20/2025 100     CARBON DIOXIDE 02/20/2025 30     ELECTROLYTE BALANCE 02/20/2025 8     CALCIUM 02/20/2025 10.1     PROTEIN, TOTAL 02/20/2025 7.5     ALBUMIN 02/20/2025 4.6     BILIRUBIN, TOTAL 02/20/2025 0.5     ALKALINE PHOSPHATASE 02/20/2025 61     AST 02/20/2025 14     ALT 02/20/2025 7     TSH W/REFLEX TO FT4 02/20/2025 4.65 (H)     T4, FREE 02/20/2025 1.4     CHOLESTEROL, TOTAL 02/20/2025 153     HDL CHOLESTEROL 02/20/2025 64     TRIGLYCERIDES 02/20/2025 112     LDL-CHOLESTEROL 02/20/2025 69     CHOL/HDLC RATIO 02/20/2025 2.4     NON HDL CHOLESTEROL 02/20/2025 89        [id number] Score  Age >= 75: 2   Sex Female: 1   CHF History No: 0   HTN Yes: 1   Stroke/TIA/Thromboembolism No: 0   Vascular Dz: CAD/PAD/Aortic Plaque No: 0   DM No: 0   Total Score 4        ECG:  Atrial flutter with 2 1 conduction heart rate 121 bpm QRS duration 94 ms QTc 370 ms    Assessment/plan:  Paroxysmal atrial fibrillation and Atrial Flutter  The patient never had a manifestation of atrial flutter before.  Her ablation was only for atrial fibrillation 6 years ago.  I would reinitiate her flecainide.  Continue anticoagulation and plan  for catheter-based therapy.  Once we achieve sinus rhythm she should continue her Eliquis for at least 3 weeks prior to discontinuation before her hip surgery.  Patient be no cardiac contraindications to her planned and necessary procedure with hip surgery although again I would ensure that she is on her blood thinner for at least 3 weeks post the restoration of sinus rhythm.  Problem List Items Addressed This Visit       Atrial fibrillation (Multi)    Relevant Orders    ECG 12 lead (Clinic Performed)      Duglas Aguila MD

## 2025-07-16 ENCOUNTER — LAB (OUTPATIENT)
Dept: LAB | Facility: HOSPITAL | Age: 80
End: 2025-07-16
Payer: MEDICARE

## 2025-07-16 DIAGNOSIS — I48.0 PAROXYSMAL ATRIAL FIBRILLATION (MULTI): ICD-10-CM

## 2025-07-16 DIAGNOSIS — I49.1 PAC (PREMATURE ATRIAL CONTRACTION): ICD-10-CM

## 2025-07-16 LAB
ABO GROUP (TYPE) IN BLOOD: NORMAL
ANION GAP SERPL CALCULATED.4IONS-SCNC: 13 MMOL/L (CALC) (ref 7–17)
ANTIBODY SCREEN: NORMAL
BUN SERPL-MCNC: 17 MG/DL (ref 7–25)
BUN/CREAT SERPL: 17 (CALC) (ref 6–22)
CALCIUM SERPL-MCNC: 9.6 MG/DL (ref 8.6–10.4)
CHLORIDE SERPL-SCNC: 100 MMOL/L (ref 98–110)
CO2 SERPL-SCNC: 27 MMOL/L (ref 20–32)
CREAT SERPL-MCNC: 0.99 MG/DL (ref 0.6–0.95)
EGFRCR SERPLBLD CKD-EPI 2021: 58 ML/MIN/1.73M2
ERYTHROCYTE [DISTWIDTH] IN BLOOD BY AUTOMATED COUNT: 12.2 % (ref 11–15)
GLUCOSE SERPL-MCNC: 146 MG/DL (ref 65–99)
HCT VFR BLD AUTO: 41.6 % (ref 35–45)
HGB BLD-MCNC: 13.7 G/DL (ref 11.7–15.5)
MCH RBC QN AUTO: 33 PG (ref 27–33)
MCHC RBC AUTO-ENTMCNC: 32.9 G/DL (ref 32–36)
MCV RBC AUTO: 100.2 FL (ref 80–100)
PLATELET # BLD AUTO: 289 THOUSAND/UL (ref 140–400)
PMV BLD REES-ECKER: 10.8 FL (ref 7.5–12.5)
POTASSIUM SERPL-SCNC: 4.4 MMOL/L (ref 3.5–5.3)
RBC # BLD AUTO: 4.15 MILLION/UL (ref 3.8–5.1)
RH FACTOR (ANTIGEN D): NORMAL
SODIUM SERPL-SCNC: 140 MMOL/L (ref 135–146)
WBC # BLD AUTO: 4.8 THOUSAND/UL (ref 3.8–10.8)

## 2025-07-16 PROCEDURE — 86900 BLOOD TYPING SEROLOGIC ABO: CPT

## 2025-07-16 PROCEDURE — 86850 RBC ANTIBODY SCREEN: CPT

## 2025-07-16 PROCEDURE — 86901 BLOOD TYPING SEROLOGIC RH(D): CPT

## 2025-07-16 RX ORDER — TRIAMTERENE AND HYDROCHLOROTHIAZIDE 37.5; 25 MG/1; MG/1
TABLET ORAL
Qty: 45 TABLET | Refills: 3 | Status: SHIPPED | OUTPATIENT
Start: 2025-07-16

## 2025-07-24 ENCOUNTER — APPOINTMENT (OUTPATIENT)
Dept: ORTHOPEDIC SURGERY | Facility: CLINIC | Age: 80
End: 2025-07-24
Payer: MEDICARE

## 2025-07-28 ENCOUNTER — APPOINTMENT (OUTPATIENT)
Dept: CARDIOLOGY | Facility: HOSPITAL | Age: 80
DRG: 274 | End: 2025-07-28
Payer: MEDICARE

## 2025-07-28 ENCOUNTER — HOSPITAL ENCOUNTER (INPATIENT)
Facility: HOSPITAL | Age: 80
LOS: 1 days | Discharge: HOME | DRG: 274 | End: 2025-07-29
Attending: INTERNAL MEDICINE | Admitting: INTERNAL MEDICINE
Payer: MEDICARE

## 2025-07-28 DIAGNOSIS — I48.0 PAROXYSMAL ATRIAL FIBRILLATION (MULTI): Primary | ICD-10-CM

## 2025-07-28 DIAGNOSIS — I48.3 TYPICAL ATRIAL FLUTTER (MULTI): ICD-10-CM

## 2025-07-28 DIAGNOSIS — G47.33 OBSTRUCTIVE SLEEP APNEA SYNDROME: ICD-10-CM

## 2025-07-28 LAB
ABO GROUP (TYPE) IN BLOOD: NORMAL
ACT BLD: 274 SEC (ref 89–169)
ACT BLD: 276 SEC (ref 89–169)
ALBUMIN SERPL BCP-MCNC: 3.8 G/DL (ref 3.4–5)
ANION GAP SERPL CALCULATED.3IONS-SCNC: 11 MMOL/L (ref 10–20)
APTT PPP: 25.9 SECONDS (ref 22–32.5)
BUN SERPL-MCNC: 19 MG/DL (ref 6–23)
CALCIUM SERPL-MCNC: 8 MG/DL (ref 8.6–10.3)
CHLORIDE SERPL-SCNC: 109 MMOL/L (ref 98–107)
CO2 SERPL-SCNC: 21 MMOL/L (ref 21–32)
CREAT SERPL-MCNC: 0.8 MG/DL (ref 0.5–1.05)
EGFRCR SERPLBLD CKD-EPI 2021: 75 ML/MIN/1.73M*2
EJECTION FRACTION: 63 %
ERYTHROCYTE [DISTWIDTH] IN BLOOD BY AUTOMATED COUNT: 13 % (ref 11.5–14.5)
GLUCOSE SERPL-MCNC: 149 MG/DL (ref 74–99)
HCT VFR BLD AUTO: 36.4 % (ref 36–46)
HGB BLD-MCNC: 12 G/DL (ref 12–16)
INR PPP: 1.1 (ref 0.9–1.2)
MAGNESIUM SERPL-MCNC: 1.85 MG/DL (ref 1.6–2.4)
MCH RBC QN AUTO: 32.7 PG (ref 26–34)
MCHC RBC AUTO-ENTMCNC: 33 G/DL (ref 32–36)
MCV RBC AUTO: 99 FL (ref 80–100)
NRBC BLD-RTO: 0 /100 WBCS (ref 0–0)
PHOSPHATE SERPL-MCNC: 3 MG/DL (ref 2.5–4.9)
PLATELET # BLD AUTO: 238 X10*3/UL (ref 150–450)
POTASSIUM SERPL-SCNC: 3.7 MMOL/L (ref 3.5–5.3)
PROTHROMBIN TIME: 11.9 SECONDS (ref 9.3–12.7)
RBC # BLD AUTO: 3.67 X10*6/UL (ref 4–5.2)
RH FACTOR (ANTIGEN D): NORMAL
SODIUM SERPL-SCNC: 137 MMOL/L (ref 136–145)
WBC # BLD AUTO: 9.7 X10*3/UL (ref 4.4–11.3)

## 2025-07-28 PROCEDURE — 93308 TTE F-UP OR LMTD: CPT | Performed by: INTERNAL MEDICINE

## 2025-07-28 PROCEDURE — 02K83ZZ MAP CONDUCTION MECHANISM, PERCUTANEOUS APPROACH: ICD-10-PCS | Performed by: INTERNAL MEDICINE

## 2025-07-28 PROCEDURE — 2020000001 HC ICU ROOM DAILY

## 2025-07-28 PROCEDURE — C1893 INTRO/SHEATH, FIXED,NON-PEEL: HCPCS | Performed by: INTERNAL MEDICINE

## 2025-07-28 PROCEDURE — 2500000002 HC RX 250 W HCPCS SELF ADMINISTERED DRUGS (ALT 637 FOR MEDICARE OP, ALT 636 FOR OP/ED): Performed by: NURSE PRACTITIONER

## 2025-07-28 PROCEDURE — 99153 MOD SED SAME PHYS/QHP EA: CPT | Performed by: INTERNAL MEDICINE

## 2025-07-28 PROCEDURE — 2500000001 HC RX 250 WO HCPCS SELF ADMINISTERED DRUGS (ALT 637 FOR MEDICARE OP): Performed by: NURSE PRACTITIONER

## 2025-07-28 PROCEDURE — 85730 THROMBOPLASTIN TIME PARTIAL: CPT

## 2025-07-28 PROCEDURE — 2500000002 HC RX 250 W HCPCS SELF ADMINISTERED DRUGS (ALT 637 FOR MEDICARE OP, ALT 636 FOR OP/ED)

## 2025-07-28 PROCEDURE — C1889 IMPLANT/INSERT DEVICE, NOC: HCPCS | Performed by: INTERNAL MEDICINE

## 2025-07-28 PROCEDURE — 83735 ASSAY OF MAGNESIUM: CPT

## 2025-07-28 PROCEDURE — 2500000004 HC RX 250 GENERAL PHARMACY W/ HCPCS (ALT 636 FOR OP/ED)

## 2025-07-28 PROCEDURE — 80069 RENAL FUNCTION PANEL: CPT

## 2025-07-28 PROCEDURE — 37799 UNLISTED PX VASCULAR SURGERY: CPT

## 2025-07-28 PROCEDURE — 85027 COMPLETE CBC AUTOMATED: CPT

## 2025-07-28 PROCEDURE — 93662 INTRACARDIAC ECG (ICE): CPT | Performed by: INTERNAL MEDICINE

## 2025-07-28 PROCEDURE — 93653 COMPRE EP EVAL TX SVT: CPT | Performed by: INTERNAL MEDICINE

## 2025-07-28 PROCEDURE — 36415 COLL VENOUS BLD VENIPUNCTURE: CPT | Performed by: NURSE PRACTITIONER

## 2025-07-28 PROCEDURE — C1760 CLOSURE DEV, VASC: HCPCS | Performed by: INTERNAL MEDICINE

## 2025-07-28 PROCEDURE — C1766 INTRO/SHEATH,STRBLE,NON-PEEL: HCPCS | Performed by: INTERNAL MEDICINE

## 2025-07-28 PROCEDURE — 02583ZZ DESTRUCTION OF CONDUCTION MECHANISM, PERCUTANEOUS APPROACH: ICD-10-PCS | Performed by: INTERNAL MEDICINE

## 2025-07-28 PROCEDURE — 93308 TTE F-UP OR LMTD: CPT

## 2025-07-28 PROCEDURE — 99291 CRITICAL CARE FIRST HOUR: CPT

## 2025-07-28 PROCEDURE — 93010 ELECTROCARDIOGRAM REPORT: CPT | Performed by: INTERNAL MEDICINE

## 2025-07-28 PROCEDURE — 2500000004 HC RX 250 GENERAL PHARMACY W/ HCPCS (ALT 636 FOR OP/ED): Performed by: INTERNAL MEDICINE

## 2025-07-28 PROCEDURE — C1730 CATH, EP, 19 OR FEW ELECT: HCPCS | Performed by: INTERNAL MEDICINE

## 2025-07-28 PROCEDURE — 93462 L HRT CATH TRNSPTL PUNCTURE: CPT | Performed by: INTERNAL MEDICINE

## 2025-07-28 PROCEDURE — C1759 CATH, INTRA ECHOCARDIOGRAPHY: HCPCS | Performed by: INTERNAL MEDICINE

## 2025-07-28 PROCEDURE — 2780000003 HC OR 278 NO HCPCS: Performed by: INTERNAL MEDICINE

## 2025-07-28 PROCEDURE — 85347 COAGULATION TIME ACTIVATED: CPT

## 2025-07-28 PROCEDURE — 99152 MOD SED SAME PHYS/QHP 5/>YRS: CPT | Performed by: INTERNAL MEDICINE

## 2025-07-28 PROCEDURE — C1731 CATH, EP, 20 OR MORE ELEC: HCPCS | Performed by: INTERNAL MEDICINE

## 2025-07-28 PROCEDURE — 2720000007 HC OR 272 NO HCPCS: Performed by: INTERNAL MEDICINE

## 2025-07-28 PROCEDURE — 93005 ELECTROCARDIOGRAM TRACING: CPT

## 2025-07-28 RX ORDER — HEPARIN SODIUM 10000 [USP'U]/100ML
INJECTION, SOLUTION INTRAVENOUS CONTINUOUS PRN
Status: DISCONTINUED | OUTPATIENT
Start: 2025-07-28 | End: 2025-07-28 | Stop reason: HOSPADM

## 2025-07-28 RX ORDER — BUPIVACAINE HYDROCHLORIDE 2.5 MG/ML
INJECTION, SOLUTION EPIDURAL; INFILTRATION; INTRACAUDAL; PERINEURAL AS NEEDED
Status: DISCONTINUED | OUTPATIENT
Start: 2025-07-28 | End: 2025-07-28 | Stop reason: HOSPADM

## 2025-07-28 RX ORDER — MAGNESIUM SULFATE HEPTAHYDRATE 40 MG/ML
2 INJECTION, SOLUTION INTRAVENOUS ONCE
Status: COMPLETED | OUTPATIENT
Start: 2025-07-28 | End: 2025-07-28

## 2025-07-28 RX ORDER — HEPARIN SODIUM 1000 [USP'U]/ML
INJECTION, SOLUTION INTRAVENOUS; SUBCUTANEOUS AS NEEDED
Status: DISCONTINUED | OUTPATIENT
Start: 2025-07-28 | End: 2025-07-28 | Stop reason: HOSPADM

## 2025-07-28 RX ORDER — TRIAMTERENE AND HYDROCHLOROTHIAZIDE 37.5; 25 MG/1; MG/1
0.5 TABLET ORAL DAILY
Status: DISCONTINUED | OUTPATIENT
Start: 2025-07-28 | End: 2025-07-29 | Stop reason: HOSPADM

## 2025-07-28 RX ORDER — ONDANSETRON 4 MG/1
4 TABLET, FILM COATED ORAL EVERY 8 HOURS PRN
Status: DISCONTINUED | OUTPATIENT
Start: 2025-07-28 | End: 2025-07-29 | Stop reason: HOSPADM

## 2025-07-28 RX ORDER — MIDAZOLAM HYDROCHLORIDE 1 MG/ML
INJECTION, SOLUTION INTRAMUSCULAR; INTRAVENOUS AS NEEDED
Status: DISCONTINUED | OUTPATIENT
Start: 2025-07-28 | End: 2025-07-28 | Stop reason: HOSPADM

## 2025-07-28 RX ORDER — SODIUM CHLORIDE 9 MG/ML
INJECTION, SOLUTION INTRAVENOUS CONTINUOUS PRN
Status: COMPLETED | OUTPATIENT
Start: 2025-07-28 | End: 2025-07-28

## 2025-07-28 RX ORDER — ONDANSETRON HYDROCHLORIDE 2 MG/ML
4 INJECTION, SOLUTION INTRAVENOUS EVERY 8 HOURS PRN
Status: DISCONTINUED | OUTPATIENT
Start: 2025-07-28 | End: 2025-07-29 | Stop reason: HOSPADM

## 2025-07-28 RX ORDER — LEVOTHYROXINE SODIUM 25 UG/1
25 TABLET ORAL EVERY OTHER DAY
Status: CANCELLED | OUTPATIENT
Start: 2025-07-29

## 2025-07-28 RX ORDER — DOPAMINE HYDROCHLORIDE 160 MG/100ML
0-10 INJECTION, SOLUTION INTRAVENOUS CONTINUOUS
Status: DISCONTINUED | OUTPATIENT
Start: 2025-07-28 | End: 2025-07-28

## 2025-07-28 RX ORDER — SIMVASTATIN 20 MG/1
20 TABLET, FILM COATED ORAL NIGHTLY
Status: DISCONTINUED | OUTPATIENT
Start: 2025-07-28 | End: 2025-07-29 | Stop reason: HOSPADM

## 2025-07-28 RX ORDER — LEVOTHYROXINE SODIUM 50 UG/1
50 TABLET ORAL EVERY OTHER DAY
Status: DISCONTINUED | OUTPATIENT
Start: 2025-07-28 | End: 2025-07-29 | Stop reason: HOSPADM

## 2025-07-28 RX ORDER — ONDANSETRON HYDROCHLORIDE 2 MG/ML
INJECTION, SOLUTION INTRAVENOUS AS NEEDED
Status: DISCONTINUED | OUTPATIENT
Start: 2025-07-28 | End: 2025-07-28 | Stop reason: HOSPADM

## 2025-07-28 RX ORDER — POTASSIUM CHLORIDE 20 MEQ/1
40 TABLET, EXTENDED RELEASE ORAL ONCE
Status: COMPLETED | OUTPATIENT
Start: 2025-07-28 | End: 2025-07-28

## 2025-07-28 RX ORDER — FENTANYL CITRATE 50 UG/ML
INJECTION, SOLUTION INTRAMUSCULAR; INTRAVENOUS AS NEEDED
Status: DISCONTINUED | OUTPATIENT
Start: 2025-07-28 | End: 2025-07-28 | Stop reason: HOSPADM

## 2025-07-28 RX ORDER — PROTAMINE SULFATE 10 MG/ML
INJECTION, SOLUTION INTRAVENOUS CONTINUOUS PRN
Status: COMPLETED | OUTPATIENT
Start: 2025-07-28 | End: 2025-07-28

## 2025-07-28 RX ORDER — FLECAINIDE ACETATE 50 MG/1
50 TABLET ORAL 2 TIMES DAILY
Status: DISCONTINUED | OUTPATIENT
Start: 2025-07-28 | End: 2025-07-29 | Stop reason: HOSPADM

## 2025-07-28 RX ORDER — NOREPINEPHRINE BITARTRATE/D5W 8 MG/250ML
0-.2 PLASTIC BAG, INJECTION (ML) INTRAVENOUS CONTINUOUS
Status: DISCONTINUED | OUTPATIENT
Start: 2025-07-28 | End: 2025-07-29 | Stop reason: HOSPADM

## 2025-07-28 RX ORDER — ACETAMINOPHEN 325 MG/1
650 TABLET ORAL EVERY 4 HOURS PRN
Status: DISCONTINUED | OUTPATIENT
Start: 2025-07-28 | End: 2025-07-29 | Stop reason: HOSPADM

## 2025-07-28 RX ORDER — LEVOTHYROXINE SODIUM 25 UG/1
25 TABLET ORAL EVERY OTHER DAY
Status: DISCONTINUED | OUTPATIENT
Start: 2025-07-29 | End: 2025-07-29 | Stop reason: HOSPADM

## 2025-07-28 RX ORDER — DOPAMINE HYDROCHLORIDE 160 MG/100ML
INJECTION, SOLUTION INTRAVENOUS CONTINUOUS PRN
Status: COMPLETED | OUTPATIENT
Start: 2025-07-28 | End: 2025-07-28

## 2025-07-28 RX ADMIN — SODIUM CHLORIDE, POTASSIUM CHLORIDE, SODIUM LACTATE AND CALCIUM CHLORIDE 500 ML: 600; 310; 30; 20 INJECTION, SOLUTION INTRAVENOUS at 14:16

## 2025-07-28 RX ADMIN — SIMVASTATIN 20 MG: 20 TABLET, FILM COATED ORAL at 21:31

## 2025-07-28 RX ADMIN — POTASSIUM CHLORIDE 40 MEQ: 1500 TABLET, EXTENDED RELEASE ORAL at 16:02

## 2025-07-28 RX ADMIN — MAGNESIUM SULFATE IN WATER 2 G: 40 INJECTION, SOLUTION INTRAVENOUS at 16:02

## 2025-07-28 RX ADMIN — APIXABAN 5 MG: 5 TABLET, FILM COATED ORAL at 21:31

## 2025-07-28 RX ADMIN — ACETAMINOPHEN 650 MG: 325 TABLET ORAL at 13:23

## 2025-07-28 SDOH — SOCIAL STABILITY: SOCIAL NETWORK: HOW OFTEN DO YOU ATTEND MEETINGS OF THE CLUBS OR ORGANIZATIONS YOU BELONG TO?: MORE THAN 4 TIMES PER YEAR

## 2025-07-28 SDOH — HEALTH STABILITY: PHYSICAL HEALTH
HOW OFTEN DO YOU NEED TO HAVE SOMEONE HELP YOU WHEN YOU READ INSTRUCTIONS, PAMPHLETS, OR OTHER WRITTEN MATERIAL FROM YOUR DOCTOR OR PHARMACY?: NEVER

## 2025-07-28 SDOH — SOCIAL STABILITY: SOCIAL INSECURITY: ARE YOU MARRIED, WIDOWED, DIVORCED, SEPARATED, NEVER MARRIED, OR LIVING WITH A PARTNER?: MARRIED

## 2025-07-28 SDOH — ECONOMIC STABILITY: HOUSING INSECURITY: IN THE PAST 12 MONTHS, HOW MANY TIMES HAVE YOU MOVED WHERE YOU WERE LIVING?: 0

## 2025-07-28 SDOH — ECONOMIC STABILITY: FOOD INSECURITY: WITHIN THE PAST 12 MONTHS, YOU WORRIED THAT YOUR FOOD WOULD RUN OUT BEFORE YOU GOT THE MONEY TO BUY MORE.: NEVER TRUE

## 2025-07-28 SDOH — SOCIAL STABILITY: SOCIAL NETWORK
IN A TYPICAL WEEK, HOW MANY TIMES DO YOU TALK ON THE PHONE WITH FAMILY, FRIENDS, OR NEIGHBORS?: MORE THAN THREE TIMES A WEEK

## 2025-07-28 SDOH — SOCIAL STABILITY: SOCIAL NETWORK: HOW OFTEN DO YOU ATTEND CHURCH OR RELIGIOUS SERVICES?: MORE THAN 4 TIMES PER YEAR

## 2025-07-28 SDOH — SOCIAL STABILITY: SOCIAL NETWORK
DO YOU BELONG TO ANY CLUBS OR ORGANIZATIONS SUCH AS CHURCH GROUPS, UNIONS, FRATERNAL OR ATHLETIC GROUPS, OR SCHOOL GROUPS?: YES

## 2025-07-28 SDOH — SOCIAL STABILITY: SOCIAL NETWORK: IN A TYPICAL WEEK, HOW MANY TIMES DO YOU TALK ON THE PHONE WITH FAMILY, FRIENDS, OR NEIGHBORS?: THREE TIMES A WEEK

## 2025-07-28 SDOH — ECONOMIC STABILITY: FOOD INSECURITY: HOW HARD IS IT FOR YOU TO PAY FOR THE VERY BASICS LIKE FOOD, HOUSING, MEDICAL CARE, AND HEATING?: NOT VERY HARD

## 2025-07-28 SDOH — SOCIAL STABILITY: SOCIAL INSECURITY
WITHIN THE LAST YEAR, HAVE YOU BEEN RAPED OR FORCED TO HAVE ANY KIND OF SEXUAL ACTIVITY BY YOUR PARTNER OR EX-PARTNER?: NO

## 2025-07-28 SDOH — HEALTH STABILITY: PHYSICAL HEALTH

## 2025-07-28 SDOH — HEALTH STABILITY: MENTAL HEALTH
DO YOU FEEL STRESS - TENSE, RESTLESS, NERVOUS, OR ANXIOUS, OR UNABLE TO SLEEP AT NIGHT BECAUSE YOUR MIND IS TROUBLED ALL THE TIME - THESE DAYS?: NOT AT ALL

## 2025-07-28 SDOH — SOCIAL STABILITY: SOCIAL NETWORK: HOW OFTEN DO YOU GET TOGETHER WITH FRIENDS OR RELATIVES?: THREE TIMES A WEEK

## 2025-07-28 SDOH — ECONOMIC STABILITY: INCOME INSECURITY: IN THE PAST 12 MONTHS HAS THE ELECTRIC, GAS, OIL, OR WATER COMPANY THREATENED TO SHUT OFF SERVICES IN YOUR HOME?: NO

## 2025-07-28 SDOH — HEALTH STABILITY: MENTAL HEALTH: HOW OFTEN DO YOU HAVE SIX OR MORE DRINKS ON ONE OCCASION?: NEVER

## 2025-07-28 SDOH — HEALTH STABILITY: MENTAL HEALTH: EXPERIENCED ANY OF THE FOLLOWING LIFE EVENTS: DEATH OF FAMILY/FRIEND

## 2025-07-28 SDOH — ECONOMIC STABILITY: HOUSING INSECURITY: AT ANY TIME IN THE PAST 12 MONTHS, WERE YOU HOMELESS OR LIVING IN A SHELTER (INCLUDING NOW)?: NO

## 2025-07-28 SDOH — HEALTH STABILITY: MENTAL HEALTH: HOW OFTEN DO YOU HAVE A DRINK CONTAINING ALCOHOL?: NEVER

## 2025-07-28 SDOH — SOCIAL STABILITY: SOCIAL INSECURITY: HAS ANYONE EVER THREATENED TO HURT YOUR FAMILY OR YOUR PETS?: NO

## 2025-07-28 SDOH — SOCIAL STABILITY: SOCIAL INSECURITY: HAVE YOU HAD ANY THOUGHTS OF HARMING ANYONE ELSE?: NO

## 2025-07-28 SDOH — ECONOMIC STABILITY: HOUSING INSECURITY: IN THE LAST 12 MONTHS, WAS THERE A TIME WHEN YOU WERE NOT ABLE TO PAY THE MORTGAGE OR RENT ON TIME?: NO

## 2025-07-28 SDOH — SOCIAL STABILITY: SOCIAL INSECURITY: WITHIN THE LAST YEAR, HAVE YOU BEEN HUMILIATED OR EMOTIONALLY ABUSED IN OTHER WAYS BY YOUR PARTNER OR EX-PARTNER?: NO

## 2025-07-28 SDOH — ECONOMIC STABILITY: TRANSPORTATION INSECURITY: IN THE PAST 12 MONTHS, HAS LACK OF TRANSPORTATION KEPT YOU FROM MEDICAL APPOINTMENTS OR FROM GETTING MEDICATIONS?: NO

## 2025-07-28 SDOH — SOCIAL STABILITY: SOCIAL INSECURITY: DOES ANYONE TRY TO KEEP YOU FROM HAVING/CONTACTING OTHER FRIENDS OR DOING THINGS OUTSIDE YOUR HOME?: NO

## 2025-07-28 SDOH — ECONOMIC STABILITY: HOUSING INSECURITY: DO YOU FEEL UNSAFE GOING BACK TO THE PLACE WHERE YOU LIVE?: NO

## 2025-07-28 SDOH — SOCIAL STABILITY: SOCIAL NETWORK: HOW OFTEN DO YOU GET TOGETHER WITH FRIENDS OR RELATIVES?: MORE THAN THREE TIMES A WEEK

## 2025-07-28 SDOH — SOCIAL STABILITY: SOCIAL INSECURITY: DO YOU FEEL ANYONE HAS EXPLOITED OR TAKEN ADVANTAGE OF YOU FINANCIALLY OR OF YOUR PERSONAL PROPERTY?: NO

## 2025-07-28 SDOH — ECONOMIC STABILITY: FOOD INSECURITY: WITHIN THE PAST 12 MONTHS, THE FOOD YOU BOUGHT JUST DIDN'T LAST AND YOU DIDN'T HAVE MONEY TO GET MORE.: NEVER TRUE

## 2025-07-28 SDOH — SOCIAL STABILITY: SOCIAL INSECURITY: ABUSE: ADULT

## 2025-07-28 SDOH — SOCIAL STABILITY: SOCIAL INSECURITY
ASK PARENT OR GUARDIAN: ARE THERE TIMES WHEN YOU, YOUR CHILD(REN), OR ANY MEMBER OF YOUR HOUSEHOLD FEEL UNSAFE, HARMED, OR THREATENED AROUND PERSONS WITH WHOM YOU KNOW OR LIVE?: NO

## 2025-07-28 SDOH — HEALTH STABILITY: PHYSICAL HEALTH: ON AVERAGE, HOW MANY DAYS PER WEEK DO YOU ENGAGE IN MODERATE TO STRENUOUS EXERCISE (LIKE A BRISK WALK)?: 0 DAYS

## 2025-07-28 SDOH — ECONOMIC STABILITY: FOOD INSECURITY: HOW HARD IS IT FOR YOU TO PAY FOR THE VERY BASICS LIKE FOOD, HOUSING, MEDICAL CARE, AND HEATING?: VERY HARD

## 2025-07-28 SDOH — SOCIAL STABILITY: SOCIAL INSECURITY: WITHIN THE LAST YEAR, HAVE YOU BEEN AFRAID OF YOUR PARTNER OR EX-PARTNER?: NO

## 2025-07-28 SDOH — SOCIAL STABILITY: SOCIAL INSECURITY: HAVE YOU HAD THOUGHTS OF HARMING ANYONE ELSE?: NO

## 2025-07-28 SDOH — HEALTH STABILITY: MENTAL HEALTH: HOW MANY DRINKS CONTAINING ALCOHOL DO YOU HAVE ON A TYPICAL DAY WHEN YOU ARE DRINKING?: PATIENT DOES NOT DRINK

## 2025-07-28 SDOH — SOCIAL STABILITY: SOCIAL INSECURITY: ARE YOU OR HAVE YOU BEEN THREATENED OR ABUSED PHYSICALLY, EMOTIONALLY, OR SEXUALLY BY ANYONE?: NO

## 2025-07-28 SDOH — SOCIAL STABILITY: SOCIAL INSECURITY: ARE THERE ANY APPARENT SIGNS OF INJURIES/BEHAVIORS THAT COULD BE RELATED TO ABUSE/NEGLECT?: NO

## 2025-07-28 SDOH — SOCIAL STABILITY: SOCIAL INSECURITY: DO YOU FEEL UNSAFE GOING BACK TO THE PLACE WHERE YOU ARE LIVING?: NO

## 2025-07-28 SDOH — SOCIAL STABILITY: SOCIAL INSECURITY: WERE YOU ABLE TO COMPLETE ALL THE BEHAVIORAL HEALTH SCREENINGS?: YES

## 2025-07-28 ASSESSMENT — LIFESTYLE VARIABLES
PRESCIPTION_ABUSE_PAST_12_MONTHS: NO
AUDIT-C TOTAL SCORE: 0
SKIP TO QUESTIONS 9-10: 1
AUDIT-C TOTAL SCORE: 0
SKIP TO QUESTIONS 9-10: 1
SKIP TO QUESTIONS 9-10: 1
SUBSTANCE_ABUSE_PAST_12_MONTHS: NO
SKIP TO QUESTIONS 9-10: 1
HOW MANY STANDARD DRINKS CONTAINING ALCOHOL DO YOU HAVE ON A TYPICAL DAY: PATIENT DOES NOT DRINK
AUDIT-C TOTAL SCORE: 0
SUBSTANCE_ABUSE_PAST_12_MONTHS: NO
PRESCIPTION_ABUSE_PAST_12_MONTHS: NO
AUDIT-C TOTAL SCORE: 0
HOW OFTEN DO YOU HAVE A DRINK CONTAINING ALCOHOL: NEVER
AUDIT-C TOTAL SCORE: 0
HOW OFTEN DO YOU HAVE 6 OR MORE DRINKS ON ONE OCCASION: NEVER
HOW MANY STANDARD DRINKS CONTAINING ALCOHOL DO YOU HAVE ON A TYPICAL DAY: PATIENT DOES NOT DRINK
HOW OFTEN DO YOU HAVE A DRINK CONTAINING ALCOHOL: NEVER
HOW OFTEN DO YOU HAVE 6 OR MORE DRINKS ON ONE OCCASION: NEVER
AUDIT-C TOTAL SCORE: 0

## 2025-07-28 ASSESSMENT — ACTIVITIES OF DAILY LIVING (ADL)
ADEQUATE_TO_COMPLETE_ADL: YES
JUDGMENT_ADEQUATE_SAFELY_COMPLETE_DAILY_ACTIVITIES: YES
ASSISTIVE_DEVICE: EYEGLASSES
TOILETING: INDEPENDENT
GROOMING: INDEPENDENT
BATHING: INDEPENDENT
LACK_OF_TRANSPORTATION: NO
PATIENT'S MEMORY ADEQUATE TO SAFELY COMPLETE DAILY ACTIVITIES?: YES
WALKS IN HOME: INDEPENDENT
FEEDING YOURSELF: INDEPENDENT
LACK_OF_TRANSPORTATION: NO
HEARING - RIGHT EAR: FUNCTIONAL
DRESSING YOURSELF: INDEPENDENT
LACK_OF_TRANSPORTATION: NO
HEARING - LEFT EAR: FUNCTIONAL

## 2025-07-28 ASSESSMENT — PAIN SCALES - GENERAL
PAINLEVEL_OUTOF10: 3
PAINLEVEL_OUTOF10: 0 - NO PAIN
PAINLEVEL_OUTOF10: 6
PAINLEVEL_OUTOF10: 0 - NO PAIN

## 2025-07-28 ASSESSMENT — COGNITIVE AND FUNCTIONAL STATUS - GENERAL
MOVING TO AND FROM BED TO CHAIR: A LITTLE
DRESSING REGULAR UPPER BODY CLOTHING: A LITTLE
STANDING UP FROM CHAIR USING ARMS: A LITTLE
TOILETING: A LITTLE
WALKING IN HOSPITAL ROOM: A LITTLE
HELP NEEDED FOR BATHING: A LITTLE
MOVING FROM LYING ON BACK TO SITTING ON SIDE OF FLAT BED WITH BEDRAILS: A LITTLE
TURNING FROM BACK TO SIDE WHILE IN FLAT BAD: A LITTLE
DAILY ACTIVITIY SCORE: 18
CLIMB 3 TO 5 STEPS WITH RAILING: A LITTLE
DRESSING REGULAR LOWER BODY CLOTHING: A LITTLE
PERSONAL GROOMING: A LITTLE
MOBILITY SCORE: 18
EATING MEALS: A LITTLE
PATIENT BASELINE BEDBOUND: NO

## 2025-07-28 ASSESSMENT — PAIN DESCRIPTION - DESCRIPTORS
DESCRIPTORS: ACHING
DESCRIPTORS: ACHING

## 2025-07-28 ASSESSMENT — PAIN DESCRIPTION - ORIENTATION: ORIENTATION: MID

## 2025-07-28 ASSESSMENT — PAIN - FUNCTIONAL ASSESSMENT
PAIN_FUNCTIONAL_ASSESSMENT: 0-10

## 2025-07-28 ASSESSMENT — PAIN DESCRIPTION - LOCATION: LOCATION: HEAD

## 2025-07-28 ASSESSMENT — PATIENT HEALTH QUESTIONNAIRE - PHQ9
2. FEELING DOWN, DEPRESSED OR HOPELESS: NOT AT ALL
1. LITTLE INTEREST OR PLEASURE IN DOING THINGS: NOT AT ALL
1. LITTLE INTEREST OR PLEASURE IN DOING THINGS: NOT AT ALL
SUM OF ALL RESPONSES TO PHQ9 QUESTIONS 1 & 2: 0

## 2025-07-28 NOTE — CONSULTS
"[facility] Critical Care Medicine       Date:  7/28/2025  Patient:  Jessy Mcdaniel  YOB: 1945  MRN:  [id number]   Admit Date:  7/28/2025  Hospital Length of Stay: 0   ICU Length of Stay: 1h   Consulted by: Dr. Aguila  Consult for: ICU management    No chief complaint on file.        History of Present Illness:  Jessy Mcdaniel is a 79 y.o. year old female patient with Past Medical History of hypertension, hyperlipidemia, hypothyroidism, SHANDA, s/p total knee replacement 2024, and paroxysmal atrial fibrillation on Eliquis presents to the ICU post procedure from cardiac ablation.  Subsequently patient developed nausea and hypotension during the case.  Cardiac echocardiographic showed no pericardial effusion.  Patient was subsequently started on dopamine and given IV fluids.  Postprocedure patient was transferred to the ICU.    Interval ICU Events:  7/28: Patient transferred to the ICU for transient hypotension postprocedure on dopamine infusion.  Will wean off dopamine to maintain MAP greater than 60.  Will recheck coags and remove arterial and venous sheath later in the day if remains stable.  Patient currently complaining of mild headache.    Subjective   Medical History:  Medical History[1]  Surgical History[2]  Prescriptions Prior to Admission[3]  Prochlorperazine and Prochlorperazine edisylate  Social History[4]  Family History[5]       Objective   Hospital Medications:    Continuous Medications[6]    Current Medications[7]    Review of Systems:  14 point review of systems was completed and negative except for those specially mention in my HPI       Objective     Physical Exam:    Heart Rate:  []   Temp:  [36.7 °C (98 °F)]   Resp:  [10-18]   BP: ()/()   Height:  [165.1 cm (5' 5\")]   Weight:  [75.3 kg (166 lb)]   SpO2:  [97 %-100 %]     Physical Exam  Vitals and nursing note reviewed.   Constitutional:       Appearance: Normal appearance. She is not ill-appearing.   HENT:      " "Head: Normocephalic.      Nose: Nose normal.      Mouth/Throat:      Mouth: Mucous membranes are moist.      Pharynx: No oropharyngeal exudate.     Eyes:      General: No scleral icterus.     Pupils: Pupils are equal, round, and reactive to light.       Cardiovascular:      Rate and Rhythm: Normal rate and regular rhythm.      Pulses: Normal pulses.   Pulmonary:      Effort: Pulmonary effort is normal. No respiratory distress.   Abdominal:      General: Abdomen is flat. There is no distension.     Musculoskeletal:         General: No swelling. Normal range of motion.     Skin:     General: Skin is warm.      Capillary Refill: Capillary refill takes 2 to 3 seconds.      Coloration: Skin is not jaundiced.      Comments: Arterial and venous sheath incision dry and intact     Neurological:      General: No focal deficit present.      Mental Status: She is alert and oriented to person, place, and time. Mental status is at baseline.     Psychiatric:         Mood and Affect: Mood normal.         Objective:    I have reviewed all medications, laboratory results, and imaging pertinent for today's encounter.           Intake/Output Summary (Last 24 hours) at 7/28/2025 1411  Last data filed at 7/28/2025 1300  Gross per 24 hour   Intake 1246.97 ml   Output 40 ml   Net 1206.97 ml       Daily Labs:  CBC:       No lab exists for component: \"PLTS\"  BMP:        No lab exists for component: \"LABALBU\", \"GFRAA\", \"LABGLOM\"  ABG:      VBG:                      Assessment/Plan:    I am currently managing this critically ill patient for the following problems:    Neuro/Psych/Pain Ctrl/Sedation:  -Pain Control: Acetaminophen  -CAM Assessment every shift, Promote Sleep/wake hygiene    Cardiovascular:  #Postprocedural hypotension  ::TTE 11/15/2022: LVEF 55 to 60%, mild to moderate MVR, mild LA dilation  -EP primary  -Norepinephrine gtt., Maintain MAP > 65   -Continue statin  -Continuous Cardiac Monitoring     Respiratory/ENT:  Currently on " room air  -Maintain SPO2 > 92%  -Promote Pulm Hygiene Daily     Renal/Volume Status (Intra & Extravascular):  -Maintain UOP 0.5-1.0 mL/kg/hr, notify provider if less than ideal  -Daily BMP, Mag & Phos, Replete electrolytes as indicated     GI:  Diet: Cardiac  Ulcer Prophylaxis: None  Bowel Prophylaxis: As needed    Endocrine:  #Hypothyroidism  -POCT Glucose as needed  -Continue home dose levothyroxine    Infectious Disease:  -Monitor for SIRS    Heme/Onc:  -Monitor for s/s of anemia  -CBC daily, Transfuse for Hgb < 7     OBGYN/MSK:  -Padded pressure points  -Skin Protocol per ICU     Ethics/Code Status:  Full code    :  DVT Prophylaxis: Eliquis to be restarted by EP  GI Prophylaxis: None  Bowel Regimen: None  Diet: Cardiac  CVC: Venous sheath  Demetrice: Arterial sheath  [place]: None  Restraints: None  Dispo: Critical care admission    This note was prepared using voice recognition software. The details of this note are correct and have been reviewed, and corrected to the best of my ability. Some grammatical areas may persist related to the Dragon software.     I have reviewed all medications, laboratory results, and imaging pertinent for today's encounter.  Critical Care Time: 40 minutes  Critical Care [place] PA-C            [1]   Past Medical History:  Diagnosis Date    Atrial fibrillation and flutter     Bilateral knee pain     Colon cancer (Multi)     Disease of thyroid gland     GERD (gastroesophageal reflux disease)     High cholesterol     Hx antineoplastic chemo 1992    Hypertension     Hypothyroidism     Leg edema, right     Osteoarthritis     Osteoporosis     Premature atrial contraction     Sleep apnea     CPAP compliant   [2]   Past Surgical History:  Procedure Laterality Date    APPENDECTOMY      BI STEREOTACTIC GUIDED BREAST RIGHT LOCALIZATION AND BIOPSY Right 09/24/2014    BI STEREOTACTIC GUIDED BREAST LOCALIZATION AND BIOPSY RIGHT [facility]    BI STEREOTACTIC  GUIDED BREAST RIGHT LOCALIZATION AND BIOPSY Right 11/27/2017    BI STEREOTACTIC GUIDED BREAST LOCALIZATION AND BIOPSY RIGHT [facility]    BI US GUIDED BREAST LOCALIZATION AND BIOPSY LEFT Left 12/09/2022    BI US GUIDED BREAST LOCALIZATION AND BIOPSY LEFT [facility]    BREAST CYST ASPIRATION  [id number]    CATARACT EXTRACTION W/ INTRAOCULAR LENS  IMPLANT, BILATERAL      COLON SURGERY  1992    COLONOSCOPY      HYSTERECTOMY      JOINT REPLACEMENT      KNEE ARTHROSCOPY W/ MENISCECTOMY Left     And Baker's cyst removal    KNEE SURGERY Right 03/13/2024    total    WISDOM TOOTH EXTRACTION     [3]   Facility-Administered Medications Prior to Admission   Medication Dose Route Frequency Provider Last Rate Last Admin    denosumab (Prolia) injection 60 mg  60 mg subcutaneous q6 months Ysabel Varela MD   60 mg at 06/16/25 0912     Medications Prior to Admission   Medication Sig Dispense Refill Last Dose/Taking    apixaban (Eliquis) 5 mg tablet TAKE 1 TABLET BY MOUTH TWICE A  tablet 0 7/27/2025 Morning    estradioL 10 mcg insert Insert 10 mcg into the vagina 2 times a week. 30 each 3 Past Week    flecainide (Tambocor) 50 mg tablet Take 1 tablet (50 mg) by mouth 2 times a day. 60 tablet 11 7/28/2025 Morning    simvastatin (Zocor) 20 mg tablet TAKE 1 TABLET (20 MG) BY MOUTH ONCE DAILY AT BEDTIME. 90 tablet 3 7/27/2025    Synthroid 50 mcg tablet TAKE 1 TABLET BY MOUTH EVERY OTHER DAY, ALTERNATING WITH 1/2 TABLET EVERY OTHER DAY 63 tablet 5 7/28/2025 Morning    triamterene-hydrochlorothiazid (Maxzide-25) 37.5-25 mg tablet TAKE 1/2 TABLET BY MOUTH ONCE DAILY IN THE MORNING. 45 tablet 3 7/27/2025   [4]   Social History  Tobacco Use    Smoking status: Never     Passive exposure: Never    Smokeless tobacco: Never   Vaping Use    Vaping status: Never Used   Substance Use Topics    Alcohol use: Never    Drug use: Never   [5]   Family History  Problem Relation Name Age of Onset    Coronary artery disease Mother       Heart disease Mother      Stroke Father     [6] norepinephrine, 0-0.2 mcg/kg/min     [7]   Current Facility-Administered Medications:     acetaminophen (Tylenol) tablet 650 mg, 650 mg, oral, q4h PRN, LIAM Giordano-CNP, 650 mg at 07/28/25 1323    apixaban (Eliquis) tablet 5 mg, 5 mg, oral, BID, LIAM Giordano-CNP    [Held by provider] flecainide (Tambocor) tablet 50 mg, 50 mg, oral, BID, Bruna Be APRN-CNP    lactated Ringer's bolus 500 mL, 500 mL, intravenous, Once, Renny Israel PA-C    [START ON 7/29/2025] levothyroxine (Synthroid, Levoxyl) tablet 25 mcg, 25 mcg, oral, Every other day, Chetna Vera    levothyroxine (Synthroid, Levoxyl) tablet 50 mcg, 50 mcg, oral, Every other day, LIAM Giordano-CNP    norepinephrine (Levophed) 8 mg in dextrose 5% 250 mL (0.032 mg/mL) infusion (premix), 0-0.2 mcg/kg/min, intravenous, Continuous, Renny Israel PA-C    ondansetron (Zofran) tablet 4 mg, 4 mg, oral, q8h PRN **OR** ondansetron (Zofran) injection 4 mg, 4 mg, intravenous, q8h PRN, Bruna Be APRN-CNP    oxygen (O2) therapy, , inhalation, Continuous - O2/gases, Bruna Be APRN-CNP    perflutren lipid microspheres (Definity) injection 0.5-10 mL of dilution, 0.5-10 mL of dilution, intravenous, Once in imaging, LIAM Giordano-CNP    perflutren protein A microsphere (Optison) injection 0.5 mL, 0.5 mL, intravenous, Once in imaging, Bruna Be APRN-CNP    simvastatin (Zocor) tablet 20 mg, 20 mg, oral, Nightly, Bruna Be APRN-CNP    sulfur hexafluoride microsphr (Lumason) injection 24.28 mg, 2 mL, intravenous, Once in imaging, RICO Giordano    [Held by provider] triamterene-hydrochlorothiazid (Maxzide-25) 37.5-25 mg per tablet 0.5 tablet, 0.5 tablet, oral, Daily, RICO Giordano

## 2025-07-28 NOTE — CARE PLAN
Problem: Pain - Adult  Goal: Verbalizes/displays adequate comfort level or baseline comfort level  7/28/2025 1333 by Alison Aranda RN  Outcome: Progressing  7/28/2025 1333 by Alison Aranda RN  Outcome: Progressing     Problem: Safety - Adult  Goal: Free from fall injury  7/28/2025 1333 by Alison Aranda RN  Outcome: Progressing  7/28/2025 1333 by Alison Aranda RN  Outcome: Progressing     Problem: Discharge Planning  Goal: Discharge to home or other facility with appropriate resources  7/28/2025 1333 by Alison Aranda RN  Outcome: Progressing  7/28/2025 1333 by Alison Aranda RN  Outcome: Progressing     Problem: Nutrition  Goal: Nutrient intake appropriate for maintaining nutritional needs  7/28/2025 1333 by Alison Aranda RN  Outcome: Progressing  7/28/2025 1333 by Alison Aranda RN  Outcome: Progressing   The patient's goals for the shift include feel better    The clinical goals for the shift include .

## 2025-07-28 NOTE — NURSING NOTE
Manual pressure release  4x4 folded under opsite  drsg  d/I no hematoma or bleeding noted  positive sensation and movement of left lower ext

## 2025-07-28 NOTE — Clinical Note
There were no immediate complications during the procedure. Pt had low BP and HR. MD aware. MD treated with medications and obtained an echo. Patient admitted to ICU

## 2025-07-28 NOTE — POST-PROCEDURE NOTE
Physician Transition of Care Summary  Invasive Cardiovascular Lab    Procedure Date: 7/28/2025  Attending:    * Duglas Aguila - Primary  Resident/Fellow/Other Assistant: Surgeons and Role:  * No surgeons found with a matching role *    Indications:   Pre-op Diagnosis      * Paroxysmal atrial fibrillation (Multi) [I48.0]     * Typical atrial flutter (Multi) [I48.3]    Post-procedure diagnosis:   Post-op Diagnosis     * Paroxysmal atrial fibrillation (Multi) [I48.0]     * Typical atrial flutter (Multi) [I48.3]  Atypical Flutter     Procedure(s):   Ablation Atrial Flutter  10635 - TX COMPRE EP EVAL ABLTJ 3D MAPG TX SVT  Left sided flutter      Procedure Findings:   See below    Description of the Procedure:   Procedure After written witnessed informed consent was obtained the procedure from the patient, the patient was transferred to the EP laboratory. The patient was in the fasting state. A grounding pad was placed. The patient was set up for monitoring of surface ECG and intracardiac electrograms. Patches for three dimensional mapping were applied to the chest and back. The right and left groins were prepped and draped in the usual sterile fashion. Bupivacaine was administered locally for anesthetic.    Right and Left Femoral Vein access was obtained. The vessel was accessed using the modified Seldinger technique. The catheters were positioned as follows:      Right Femoral Vein - 6 Fr Coronary sinus EPXT octopolar catheter  Right Femoral Artery - Arterial Line for hemodynamic monitoring  Right Femoral Vein - [facility] Tacticath irrigated 7 Maori ablation catheter via an Agilis steerable sheath, 20 prole grid mapping catheter  Left femoral vein -9 Maori intracardiac echocardiography catheter    Baseline intracardiac intervals were normal    Arrhythmias included and treatment:  The patient was in an atypical atrial flutter that appeared to be left atrial in origin.  We proceeded to transseptal puncture and  mapping     The cycle length was 230 ms with atrial overdrive pacing confirming a pathway participation in the tachycardia. We then proceeded with transseptal puncture utilizing intracardiac echocardiography catheter. An SLO sheath and a BR K1 needle was used under biplane fluoroscopy. The sheath was changed out to an Agilis steerable sheath prior to using a tactic cath open irrigation catheter to map and locate the pathway. Three-dimensional mapping was performed utilizing the [employer] mapping system. A map was created with the 20 pole grid catheter.  The circuit appeared to be mitral isthmus in origin.  Ablation was applied with an open irrigation TactiCath catheter at the mitral isthmus with termination of the tachycardia.  There were no further arrhythmias. Heparin was given throughout the procedure once we obtained left atrial access and ACT's were maintained around 300. All the vascular access sites were closed with Vascade closure system.    The patient did develop hypotension and nausea during the case.  Intracardiac echocardiography showed no evidence of a pericardial effusion.  IV fluids and dopamine were utilized to maintain blood pressure.  A stat surface trans thoracic echo was also obtained showing no evidence of pericardial effusion or tamponade.  Blood pressure stabilized and she was admitted to the hospital.    Summary:  Successful radiofrequency ablation of an left-sided atypical atrial flutter (mitral isthmus dependent)    Complications:   As noted hypotension likely vagally mediated    Stents/Implants:   Implants       No implant documentation for this case.            Anticoagulation/Antiplatelet Plan:   Heparin    Estimated Blood Loss:   40 mL    Anesthesia: Moderate Sedation Anesthesia Staff: No anesthesia staff entered.    Any Specimen(s) Removed:   Order Name Source Comment Collection Info Order Time   VERAB/VERIFY ABORH Blood, Venous **This is for confirming/verifying  history of ABORh on file for transfusion of blood products. If this is not for transfusion, please order an ABO/RH [[id number]]. If you have any questions or unsure what to order, please call the blood bank.** Collected By: Rupal Denis RN 7/28/2025  9:27 AM     Release result to [facility]   Immediate            Disposition:   Stable      Electronically signed by: Duglas Aguila MD, 7/28/2025 12:50 PM

## 2025-07-28 NOTE — NURSING NOTE
4x4 drsg with tegaderm drsg applied  no hematoma or bruising noted  right dp with doppler foot cool to touch capillary brisk positive sensation and movement  of right lower ext  per Victoria Alexandre Rn

## 2025-07-29 VITALS
TEMPERATURE: 97 F | SYSTOLIC BLOOD PRESSURE: 105 MMHG | HEIGHT: 65 IN | OXYGEN SATURATION: 98 % | WEIGHT: 166 LBS | HEART RATE: 65 BPM | BODY MASS INDEX: 27.66 KG/M2 | DIASTOLIC BLOOD PRESSURE: 76 MMHG | RESPIRATION RATE: 23 BRPM

## 2025-07-29 LAB
ANION GAP SERPL CALCULATED.3IONS-SCNC: 10 MMOL/L (ref 10–20)
ATRIAL RATE: 80 BPM
BUN SERPL-MCNC: 17 MG/DL (ref 6–23)
CALCIUM SERPL-MCNC: 8.4 MG/DL (ref 8.6–10.3)
CHLORIDE SERPL-SCNC: 107 MMOL/L (ref 98–107)
CO2 SERPL-SCNC: 27 MMOL/L (ref 21–32)
CREAT SERPL-MCNC: 0.91 MG/DL (ref 0.5–1.05)
EGFRCR SERPLBLD CKD-EPI 2021: 64 ML/MIN/1.73M*2
ERYTHROCYTE [DISTWIDTH] IN BLOOD BY AUTOMATED COUNT: 13.4 % (ref 11.5–14.5)
GLUCOSE SERPL-MCNC: 90 MG/DL (ref 74–99)
HCT VFR BLD AUTO: 35.5 % (ref 36–46)
HGB BLD-MCNC: 11.4 G/DL (ref 12–16)
MAGNESIUM SERPL-MCNC: 2.38 MG/DL (ref 1.6–2.4)
MCH RBC QN AUTO: 32.9 PG (ref 26–34)
MCHC RBC AUTO-ENTMCNC: 32.1 G/DL (ref 32–36)
MCV RBC AUTO: 103 FL (ref 80–100)
NRBC BLD-RTO: 0 /100 WBCS (ref 0–0)
P AXIS: 73 DEGREES
P OFFSET: 175 MS
P ONSET: 108 MS
PLATELET # BLD AUTO: 221 X10*3/UL (ref 150–450)
POTASSIUM SERPL-SCNC: 4.6 MMOL/L (ref 3.5–5.3)
PR INTERVAL: 226 MS
Q ONSET: 221 MS
QRS COUNT: 13 BEATS
QRS DURATION: 94 MS
QT INTERVAL: 386 MS
QTC CALCULATION(BAZETT): 445 MS
QTC FREDERICIA: 425 MS
R AXIS: 71 DEGREES
RBC # BLD AUTO: 3.46 X10*6/UL (ref 4–5.2)
SODIUM SERPL-SCNC: 139 MMOL/L (ref 136–145)
T AXIS: 63 DEGREES
T OFFSET: 414 MS
VENTRICULAR RATE: 80 BPM
WBC # BLD AUTO: 5.4 X10*3/UL (ref 4.4–11.3)

## 2025-07-29 PROCEDURE — 83735 ASSAY OF MAGNESIUM: CPT | Performed by: REGISTERED NURSE

## 2025-07-29 PROCEDURE — 85027 COMPLETE CBC AUTOMATED: CPT | Performed by: REGISTERED NURSE

## 2025-07-29 PROCEDURE — 36415 COLL VENOUS BLD VENIPUNCTURE: CPT | Performed by: REGISTERED NURSE

## 2025-07-29 PROCEDURE — 2500000001 HC RX 250 WO HCPCS SELF ADMINISTERED DRUGS (ALT 637 FOR MEDICARE OP): Performed by: NURSE PRACTITIONER

## 2025-07-29 PROCEDURE — 2500000001 HC RX 250 WO HCPCS SELF ADMINISTERED DRUGS (ALT 637 FOR MEDICARE OP)

## 2025-07-29 PROCEDURE — 80048 BASIC METABOLIC PNL TOTAL CA: CPT | Performed by: REGISTERED NURSE

## 2025-07-29 RX ORDER — LEVOTHYROXINE SODIUM 25 UG/1
25 TABLET ORAL EVERY OTHER DAY
Qty: 15 TABLET | Refills: 2 | Status: SHIPPED | OUTPATIENT
Start: 2025-07-31

## 2025-07-29 RX ORDER — TRIAMTERENE AND HYDROCHLOROTHIAZIDE 37.5; 25 MG/1; MG/1
1 TABLET ORAL DAILY
Qty: 30 TABLET | Refills: 1 | Status: SHIPPED | OUTPATIENT
Start: 2025-07-30

## 2025-07-29 RX ADMIN — LEVOTHYROXINE SODIUM 25 MCG: 0.03 TABLET ORAL at 08:24

## 2025-07-29 RX ADMIN — ACETAMINOPHEN 650 MG: 325 TABLET ORAL at 00:59

## 2025-07-29 RX ADMIN — APIXABAN 5 MG: 5 TABLET, FILM COATED ORAL at 08:24

## 2025-07-29 ASSESSMENT — PAIN - FUNCTIONAL ASSESSMENT
PAIN_FUNCTIONAL_ASSESSMENT: 0-10

## 2025-07-29 ASSESSMENT — PAIN SCALES - GENERAL
PAINLEVEL_OUTOF10: 5 - MODERATE PAIN
PAINLEVEL_OUTOF10: 0 - NO PAIN

## 2025-07-29 ASSESSMENT — PAIN DESCRIPTION - LOCATION: LOCATION: BACK

## 2025-07-29 ASSESSMENT — PAIN DESCRIPTION - ORIENTATION: ORIENTATION: MID

## 2025-07-29 ASSESSMENT — PAIN DESCRIPTION - DESCRIPTORS: DESCRIPTORS: ACHING

## 2025-07-29 NOTE — NURSING NOTE
Discharge education completed with pt and spouse. All questions answered, pt and spouse verbalized understanding.

## 2025-07-29 NOTE — PROGRESS NOTES
ICU Night Rounds  Time VS Vent/Oxygn Drips Exam Notes   2020 98/56 (69), HR 67 RA 98%, RR 25 on monitor.  Patient does not appear tachypneic on exam None Denies chest pain, denies any pain, no shortness of breath  Bilateral groin dressings are CDI.  No hematomas. As needed pain assessments, standard pulmonary toilet, monitor groins.           0628 94/60 (71), HR 57 94% on RA, RR 19 None Resting comfortably No acute issues   0655 AM Labs Calcium 8.4.  Electrolytes otherwise completely within normal limits.  No replacements required.        Luiza Staton MD  7/28/2025  8:40 PM

## 2025-07-29 NOTE — CARE PLAN
The patient's goals for the shift include feel better    The clinical goals for the shift include discharge    Problem: Pain - Adult  Goal: Verbalizes/displays adequate comfort level or baseline comfort level  Outcome: Progressing     Problem: Safety - Adult  Goal: Free from fall injury  Outcome: Progressing     Problem: Discharge Planning  Goal: Discharge to home or other facility with appropriate resources  Outcome: Progressing     Problem: Chronic Conditions and Co-morbidities  Goal: Patient's chronic conditions and co-morbidity symptoms are monitored and maintained or improved  Outcome: Progressing     Problem: Nutrition  Goal: Nutrient intake appropriate for maintaining nutritional needs  Outcome: Progressing     Problem: Skin  Goal: Decreased wound size/increased tissue granulation at next dressing change  Outcome: Progressing  Goal: Participates in plan/prevention/treatment measures  Outcome: Progressing  Goal: Prevent/manage excess moisture  Outcome: Progressing  Goal: Prevent/minimize sheer/friction injuries  Outcome: Progressing  Goal: Promote/optimize nutrition  Outcome: Progressing  Goal: Promote skin healing  Outcome: Progressing     Problem: Pain  Goal: Takes deep breaths with improved pain control throughout the shift  Outcome: Progressing  Goal: Turns in bed with improved pain control throughout the shift  Outcome: Progressing  Goal: Walks with improved pain control throughout the shift  Outcome: Progressing  Goal: Performs ADL's with improved pain control throughout shift  Outcome: Progressing  Goal: Participates in PT with improved pain control throughout the shift  Outcome: Progressing  Goal: Free from opioid side effects throughout the shift  Outcome: Progressing  Goal: Free from acute confusion related to pain meds throughout the shift  Outcome: Progressing

## 2025-07-29 NOTE — CARE PLAN
The clinical goals for the shift include Remain hemodynamically stable    Over the shift, the patient did make progress toward the following goals.       Problem: Pain - Adult  Goal: Verbalizes/displays adequate comfort level or baseline comfort level  Outcome: Progressing     Problem: Safety - Adult  Goal: Free from fall injury  Outcome: Progressing     Problem: Skin  Goal: Decreased wound size/increased tissue granulation at next dressing change  Outcome: Progressing  Flowsheets (Taken 7/29/2025 0326)  Decreased wound size/increased tissue granulation at next dressing change:   Promote sleep for wound healing   Protective dressings over bony prominences  Goal: Participates in plan/prevention/treatment measures  Outcome: Progressing  Flowsheets (Taken 7/29/2025 0326)  Participates in plan/prevention/treatment measures:   Elevate heels   Increase activity/out of bed for meals  Goal: Prevent/manage excess moisture  Outcome: Progressing  Flowsheets (Taken 7/29/2025 0326)  Prevent/manage excess moisture:   Cleanse incontinence/protect with barrier cream   Moisturize dry skin  Goal: Prevent/minimize sheer/friction injuries  Outcome: Progressing  Flowsheets (Taken 7/29/2025 0326)  Prevent/minimize sheer/friction injuries:   Complete micro-shifts as needed if patient unable. Adjust patient position to relieve pressure points, not a full turn   HOB 30 degrees or less   Increase activity/out of bed for meals   Turn/reposition every 2 hours/use positioning/transfer devices   Use pull sheet  Goal: Promote/optimize nutrition  Outcome: Progressing  Flowsheets (Taken 7/29/2025 0326)  Promote/optimize nutrition:   Consume > 50% meals/supplements   Monitor/record intake including meals   Offer water/supplements/favorite foods   Assist with feeding  Goal: Promote skin healing  Outcome: Progressing  Flowsheets (Taken 7/29/2025 0326)  Promote skin healing:   Assess skin/pad under line(s)/device(s)   Ensure correct size (line/device)  and apply per  instructions   Protective dressings over bony prominences   Rotate device position/do not position patient on device   Turn/reposition every 2 hours/use positioning/transfer devices

## 2025-07-30 ASSESSMENT — PAIN SCALES - GENERAL: PAINLEVEL_OUTOF10: 2

## 2025-07-31 ENCOUNTER — CLINICAL SUPPORT (OUTPATIENT)
Dept: PREADMISSION TESTING | Facility: HOSPITAL | Age: 80
End: 2025-07-31
Payer: MEDICARE

## 2025-07-31 ASSESSMENT — DUKE ACTIVITY SCORE INDEX (DASI)
CAN YOU HAVE SEXUAL RELATIONS: YES
CAN YOU DO LIGHT WORK AROUND THE HOUSE LIKE DUSTING OR WASHING DISHES: YES
CAN YOU DO YARD WORK LIKE RAKING LEAVES, WEEDING OR PUSHING A MOWER: NO
DASI METS SCORE: 5.4
CAN YOU CLIMB A FLIGHT OF STAIRS OR WALK UP A HILL: YES
TOTAL_SCORE: 21.45
CAN YOU RUN A SHORT DISTANCE: NO
CAN YOU WALK INDOORS, SUCH AS AROUND YOUR HOUSE: YES
CAN YOU DO HEAVY WORK AROUND THE HOUSE LIKE SCRUBBING FLOORS OR LIFTING AND MOVING HEAVY FURNITURE: NO
CAN YOU DO MODERATE WORK AROUND THE HOUSE LIKE VACUUMING, SWEEPING FLOORS OR CARRYING GROCERIES: YES
CAN YOU PARTICIPATE IN MODERATE RECREATIONAL ACTIVITIES LIKE GOLF, BOWLING, DANCING, DOUBLES TENNIS OR THROWING A BASEBALL OR FOOTBALL: NO
CAN YOU WALK A BLOCK OR TWO ON LEVEL GROUND: NO
CAN YOU PARTICIPATE IN STRENOUS SPORTS LIKE SWIMMING, SINGLES TENNIS, FOOTBALL, BASKETBALL, OR SKIING: NO
CAN YOU TAKE CARE OF YOURSELF (EAT, DRESS, BATHE, OR USE TOILET): YES

## 2025-07-31 NOTE — CPM/PAT NURSE NOTE
CPM/PAT Nurse Note      Name: Jessy Mcdaniel (Jessy Mcdaniel)  /Age: 1945/79 y.o.       Medical History[1]    Surgical History[2]    Patient Sexual activity questions deferred to the physician.    Family History[3]    Allergies[4]    Prior to Admission medications   Medication Sig Start Date End Date Taking? Authorizing Provider   apixaban (Eliquis) 5 mg tablet TAKE 1 TABLET BY MOUTH TWICE A DAY 25   Duglas Aguila MD   estradioL 10 mcg insert Insert 10 mcg into the vagina 2 times a week. 24   Ysabel Varela MD   levothyroxine (Synthroid, Levoxyl) 25 mcg tablet Take 1 tablet (25 mcg) by mouth every other day. Take on an empty stomach at the same time each day, either 30 to 60 minutes prior to breakfast 25   Duglas Aguila MD   simvastatin (Zocor) 20 mg tablet TAKE 1 TABLET (20 MG) BY MOUTH ONCE DAILY AT BEDTIME. 25  Ysabel Varela MD   Synthroid 50 mcg tablet TAKE 1 TABLET BY MOUTH EVERY OTHER DAY, ALTERNATING WITH 1/2 TABLET EVERY OTHER DAY 25   Ysabel Varela MD   triamterene-hydrochlorothiazid (Maxzide-25) 37.5-25 mg tablet TAKE 1/2 TABLET BY MOUTH ONCE DAILY IN THE MORNING. 25   Duglas Aguila MD   triamterene-hydrochlorothiazid (Maxzide-25) 37.5-25 mg tablet Take 1 tablet by mouth once daily. Do not start before 2025. 25   Duglas Aguila MD   flecainide (Tambocor) 50 mg tablet Take 1 tablet (50 mg) by mouth 2 times a day. 7/15/25 7/29/25  Duglas Aguila MD        PAT ROS     DASI Risk Score      Flowsheet Row Clinical Support from 2025 in [facility] Pre-Admission Testing from 2024 in [facility]   Can you take care of yourself (eat, dress, bathe, or use toilet)?  2.75 filed at 2025 1131 2.75 filed at 2024 0926   Can you walk indoors, such as around your house? 1.75 filed at 2025 1131 1.75 filed at 2024 0926   Can you walk a block or two on level ground?  0  filed at 07/31/2025 1131 2.75 filed at 02/28/2024 0926   Can you climb a flight of stairs or walk up a hill? 5.5 filed at 07/31/2025 1131 5.5 filed at 02/28/2024 0926   Can you run a short distance? 0 filed at 07/31/2025 1131 0 filed at 02/28/2024 0926   Can you do light work around the house like dusting or washing dishes? 2.7 filed at 07/31/2025 1131 2.7 filed at 02/28/2024 0926   Can you do moderate work around the house like vacuuming, sweeping floors or carrying groceries? 3.5 filed at 07/31/2025 1131 3.5 filed at 02/28/2024 0926   Can you do heavy work around the house like scrubbing floors or lifting and moving heavy furniture?  0 filed at 07/31/2025 1131 8 filed at 02/28/2024 0926   Can you do yard work like raking leaves, weeding or pushing a mower? 0 filed at 07/31/2025 1131 0 filed at 02/28/2024 0926   Can you have sexual relations? 5.25 filed at 07/31/2025 1131 5.25 filed at 02/28/2024 0926   Can you participate in moderate recreational activities like golf, bowling, dancing, doubles tennis or throwing a baseball or football? 0 filed at 07/31/2025 1131 0 filed at 02/28/2024 0926   Can you participate in strenous sports like swimming, singles tennis, football, basketball, or skiing? 0 filed at 07/31/2025 1131 0 filed at 02/28/2024 0926   DASI SCORE 21.45 filed at 07/31/2025 1131 32.2 filed at 02/28/2024 0926   METS Score (Will be calculated only when all the questions are answered) 5.4 filed at 07/31/2025 1131 6.7 filed at 02/28/2024 0926     Caprini DVT Assessment      Flowsheet Row Admission (Discharged) from 7/28/2025 in [facility] with Duglas Aguila MD Admission (Discharged) from 3/13/2024 in [facility] [place] with Nic Guerra MD   DVT Score (IF A SCORE IS NOT CALCULATING, MUST SELECT A BMI TO COMPLETE) 4 filed at 07/28/2025 1155 14 filed at 03/13/2024 1018   BMI (BMI MUST BE CHOSEN) 30 or less filed at 07/28/2025 4675 --    RETIRED: Current Status -- Elective major lower extremity arthroplasty filed at 03/13/2024 1018   RETIRED: History -- Prior major surgery, Previous malignancy, SVT, DVT/PE filed at 03/13/2024 1018   RETIRED: Age -- Over 75 years filed at 03/13/2024 1018     Modified Frailty Index    No data to display  [id number] Stroke Risk Points  Current as of just now        4 0 to 9 Points:      No Change          The [id number] risk score (Lip THERESA, et al. 2009. © 2010 [employer]) quantifies the risk of stroke for a patient with atrial fibrillation. For patients without atrial fibrillation or under the age of 18 this score appears as N/A. Higher score values generally indicate higher risk of stroke.          Points Metrics   0 Has Congestive Heart Failure:  No     Patients with congestive heart failure get 1 point.    Current as of just now   1 Has Hypertension:  Yes     Patients with hypertension get 1 point.    Current as of just now   2 Age:  79     Patients 65 to 74 years old get 1 point, or patients 75 years and older get 2 points.    Current as of just now   0 Has Diabetes:  No     Patients with diabetes get 1 point.    Current as of just now   0 Had Stroke:  No  Had TIA:  No  Had Thromboembolism:  No     Patients who have had a stroke, TIA, or thromboembolism get 2 points.    Current as of just now   0 Has Vascular Disease:  No     Patients with vascular disease get 1 point.    Current as of just now   1 Clinically Relevant Sex:  Female     Patients with a clinically relevant sex of Female get 1 point.    Current as of just now             Revised Cardiac Risk Index      Flowsheet Row Pre-Admission Testing from 2/28/2024 in [facility]   High-Risk Surgery (Intraperitoneal, Intrathoracic,Suprainguinal vascular) 0 filed at 02/28/2024 1000   History of ischemic heart disease (History of MI, History of positive exercuse test, Current chest paint considered due to myocardial  ischemia, Use of nitrate therapy, ECG with pathological Q Waves) 0 filed at 02/28/2024 1000   History of congestive heart failure (pulmonary edemia, bilateral rales or S3 gallop, Paroxysmal nocturnal dyspnea, CXR showing pulmonary vascular redistribution) 0 filed at 02/28/2024 1000   History of cerebrovascular disease (Prior TIA or stroke) 0 filed at 02/28/2024 1000   Pre-operative insulin treatment 0 filed at 02/28/2024 1000   Pre-operative creatinine>2 mg/dl 0 filed at 02/28/2024 1000   Revised Cardiac Risk Calculator 0 filed at 02/28/2024 1000     Apfel Simplified Score      Flowsheet Row Pre-Admission Testing from 2/28/2024 in [facility]   Smoking status 1 filed at 02/28/2024 1001   History of motion sickness or PONV  0 filed at 02/28/2024 1001   Use of postoperative opioids 0 filed at 02/28/2024 1001   Apfel Simplified Score Calculator 2 filed at 02/28/2024 1001     Risk Analysis Index Results This Encounter    No data found in the last 10 encounters.       Stop Bang Score      Flowsheet Row Clinical Support from 7/31/2025 in [facility] Pre-Admission Testing from 2/28/2024 in [facility]   Do you snore loudly? 1 filed at 07/31/2025 1132 0 filed at 02/28/2024 0925   Do you often feel tired or fatigued after your sleep? 0 filed at 07/31/2025 1132 0 filed at 02/28/2024 0925   Has anyone ever observed you stop breathing in your sleep? 1 filed at 07/31/2025 1132 0 filed at 02/28/2024 0925   Do you have or are you being treated for high blood pressure? 1 filed at 07/31/2025 1132 1 filed at 02/28/2024 0925   Recent BMI (Calculated) 27.6 filed at 07/31/2025 1132 27.4 filed at 02/28/2024 0925   Is BMI greater than 35 kg/m2? 0=No filed at 07/31/2025 1132 0=No filed at 02/28/2024 0925   Age older than 50 years old? 1=Yes filed at 07/31/2025 1132 1=Yes filed at 02/28/2024 0925   Is your neck circumference greater than 17 inches (Male) or 16 inches (Female)? 0 filed at  07/31/2025 1132 0 filed at 02/28/2024 0925   Gender - Male 0=No filed at 07/31/2025 1132 0=No filed at 02/28/2024 0925   STOP-BANG Total Score 4 filed at 07/31/2025 1132 2 filed at 02/28/2024 0925     Prodigy: High Risk  Total Score: 16              Prodigy Age Score           ARISCAT Score for Postoperative Pulmonary Complications    No data to display  Steve Perioperative Risk for Myocardial Infarction or Cardiac Arrest (CINDI)    No data to display      Nurse Plan of Action:     Triage nurse screening call completed. Pt is scheduled for PAT office appt 8/6/25. Cardiac clearance sent to Dr. Aguila.    Before placing the call, time was spent reviewing care plans (barriers, goals, etc.), medication lists, previous doctor visits, allergy lists, demographics, insurance information, previous care notes, last appointment, social determinants of health, wellness visits, upcoming appointments and any recent hospital visits.           [1]   Past Medical History:  Diagnosis Date    Atrial fibrillation and flutter     Bilateral knee pain     Colon cancer (Multi)     Disease of thyroid gland     GERD (gastroesophageal reflux disease)     High cholesterol     Hx antineoplastic chemo 1992    Hypertension     Hypothyroidism     Leg edema, right     Osteoarthritis     Osteoporosis     Premature atrial contraction     Sleep apnea     CPAP compliant   [2]   Past Surgical History:  Procedure Laterality Date    APPENDECTOMY      BI STEREOTACTIC GUIDED BREAST RIGHT LOCALIZATION AND BIOPSY Right 09/24/2014    BI STEREOTACTIC GUIDED BREAST LOCALIZATION AND BIOPSY RIGHT [facility]    BI STEREOTACTIC GUIDED BREAST RIGHT LOCALIZATION AND BIOPSY Right 11/27/2017    BI STEREOTACTIC GUIDED BREAST LOCALIZATION AND BIOPSY RIGHT [facility]    BI US GUIDED BREAST LOCALIZATION AND BIOPSY LEFT Left 12/09/2022    BI US GUIDED BREAST LOCALIZATION AND BIOPSY LEFT [facility]    BREAST CYST ASPIRATION  [id number]    CARDIAC  ELECTROPHYSIOLOGY PROCEDURE N/A 7/28/2025    Procedure: Ablation Atrial Flutter;  Surgeon: Duglas Aguila MD;  Location: [facility] Cardiac Cath Lab;  Service: Electrophysiology;  Laterality: N/A;  ATRIAL FLUTTER ABLATION/ ENSITE    CATARACT EXTRACTION W/ INTRAOCULAR LENS  IMPLANT, BILATERAL      COLON SURGERY  1992    COLONOSCOPY      HYSTERECTOMY      JOINT REPLACEMENT      KNEE ARTHROSCOPY W/ MENISCECTOMY Left     And Baker's cyst removal    KNEE SURGERY Right 03/13/2024    total    WISDOM TOOTH EXTRACTION     [3]   Family History  Problem Relation Name Age of Onset    Coronary artery disease Mother      Heart disease Mother      Stroke Father     [4]   Allergies  Allergen Reactions    Compazine [Prochlorperazine] Anxiety and Hallucinations

## 2025-08-06 ENCOUNTER — LAB (OUTPATIENT)
Facility: HOSPITAL | Age: 80
End: 2025-08-06
Payer: MEDICARE

## 2025-08-06 ENCOUNTER — PRE-ADMISSION TESTING (OUTPATIENT)
Dept: PREADMISSION TESTING | Facility: HOSPITAL | Age: 80
End: 2025-08-06
Payer: MEDICARE

## 2025-08-06 VITALS
HEART RATE: 71 BPM | DIASTOLIC BLOOD PRESSURE: 60 MMHG | OXYGEN SATURATION: 98 % | RESPIRATION RATE: 18 BRPM | WEIGHT: 162.26 LBS | HEIGHT: 65 IN | TEMPERATURE: 98.2 F | SYSTOLIC BLOOD PRESSURE: 127 MMHG | BODY MASS INDEX: 27.03 KG/M2

## 2025-08-06 DIAGNOSIS — Z01.818 PRE-OP EXAMINATION: Primary | ICD-10-CM

## 2025-08-06 DIAGNOSIS — M16.11 PRIMARY OSTEOARTHRITIS OF RIGHT HIP: ICD-10-CM

## 2025-08-06 DIAGNOSIS — R52 PAIN, UNSPECIFIED: ICD-10-CM

## 2025-08-06 DIAGNOSIS — R79.89 OTHER SPECIFIED ABNORMAL FINDINGS OF BLOOD CHEMISTRY: ICD-10-CM

## 2025-08-06 LAB
ABO GROUP (TYPE) IN BLOOD: NORMAL
ANTIBODY SCREEN: NORMAL
APPEARANCE UR: CLEAR
BACTERIA #/AREA URNS AUTO: ABNORMAL /HPF
BILIRUB UR STRIP.AUTO-MCNC: NEGATIVE MG/DL
COLOR UR: ABNORMAL
EST. AVERAGE GLUCOSE BLD GHB EST-MCNC: 114 MG/DL
GLUCOSE UR STRIP.AUTO-MCNC: NORMAL MG/DL
HBA1C MFR BLD: 5.6 % (ref ?–5.7)
KETONES UR STRIP.AUTO-MCNC: NEGATIVE MG/DL
LEUKOCYTE ESTERASE UR QL STRIP.AUTO: ABNORMAL
NITRITE UR QL STRIP.AUTO: NEGATIVE
PH UR STRIP.AUTO: 7 [PH]
PROT UR STRIP.AUTO-MCNC: NEGATIVE MG/DL
RBC # UR STRIP.AUTO: NEGATIVE MG/DL
RBC #/AREA URNS AUTO: ABNORMAL /HPF
RH FACTOR (ANTIGEN D): NORMAL
SP GR UR STRIP.AUTO: 1.01
SQUAMOUS #/AREA URNS AUTO: ABNORMAL /HPF
UROBILINOGEN UR STRIP.AUTO-MCNC: NORMAL MG/DL
WBC #/AREA URNS AUTO: ABNORMAL /HPF

## 2025-08-06 PROCEDURE — 36415 COLL VENOUS BLD VENIPUNCTURE: CPT

## 2025-08-06 PROCEDURE — 99214 OFFICE O/P EST MOD 30 MIN: CPT

## 2025-08-06 PROCEDURE — 87081 CULTURE SCREEN ONLY: CPT | Mod: WESLAB

## 2025-08-06 PROCEDURE — 87086 URINE CULTURE/COLONY COUNT: CPT | Mod: WESLAB

## 2025-08-06 PROCEDURE — 81001 URINALYSIS AUTO W/SCOPE: CPT

## 2025-08-06 PROCEDURE — 86901 BLOOD TYPING SEROLOGIC RH(D): CPT

## 2025-08-06 PROCEDURE — 83036 HEMOGLOBIN GLYCOSYLATED A1C: CPT | Mod: WESLAB

## 2025-08-06 RX ORDER — CHLORHEXIDINE GLUCONATE ORAL RINSE 1.2 MG/ML
SOLUTION DENTAL
Qty: 473 ML | Refills: 0 | Status: SHIPPED | OUTPATIENT
Start: 2025-08-06 | End: 2025-08-20

## 2025-08-06 ASSESSMENT — DUKE ACTIVITY SCORE INDEX (DASI)
CAN YOU DO LIGHT WORK AROUND THE HOUSE LIKE DUSTING OR WASHING DISHES: YES
CAN YOU WALK INDOORS, SUCH AS AROUND YOUR HOUSE: YES
CAN YOU CLIMB A FLIGHT OF STAIRS OR WALK UP A HILL: YES
CAN YOU DO HEAVY WORK AROUND THE HOUSE LIKE SCRUBBING FLOORS OR LIFTING AND MOVING HEAVY FURNITURE: NO
CAN YOU HAVE SEXUAL RELATIONS: YES
DASI METS SCORE: 5.4
CAN YOU TAKE CARE OF YOURSELF (EAT, DRESS, BATHE, OR USE TOILET): YES
CAN YOU PARTICIPATE IN STRENOUS SPORTS LIKE SWIMMING, SINGLES TENNIS, FOOTBALL, BASKETBALL, OR SKIING: NO
CAN YOU WALK A BLOCK OR TWO ON LEVEL GROUND: NO
CAN YOU DO YARD WORK LIKE RAKING LEAVES, WEEDING OR PUSHING A MOWER: NO
TOTAL_SCORE: 21.45
CAN YOU PARTICIPATE IN MODERATE RECREATIONAL ACTIVITIES LIKE GOLF, BOWLING, DANCING, DOUBLES TENNIS OR THROWING A BASEBALL OR FOOTBALL: NO
CAN YOU DO MODERATE WORK AROUND THE HOUSE LIKE VACUUMING, SWEEPING FLOORS OR CARRYING GROCERIES: YES
CAN YOU RUN A SHORT DISTANCE: NO

## 2025-08-06 ASSESSMENT — ENCOUNTER SYMPTOMS
ALLERGIC/IMMUNOLOGIC NEGATIVE: 1
PSYCHIATRIC NEGATIVE: 1
HEMATOLOGIC/LYMPHATIC NEGATIVE: 1
CARDIOVASCULAR NEGATIVE: 1
RESPIRATORY NEGATIVE: 1
ENDOCRINE NEGATIVE: 1
CONSTITUTIONAL NEGATIVE: 1
JOINT SWELLING: 1
GASTROINTESTINAL NEGATIVE: 1
EYES NEGATIVE: 1

## 2025-08-06 ASSESSMENT — PAIN DESCRIPTION - DESCRIPTORS: DESCRIPTORS: ACHING

## 2025-08-06 ASSESSMENT — PAIN - FUNCTIONAL ASSESSMENT: PAIN_FUNCTIONAL_ASSESSMENT: 0-10

## 2025-08-06 ASSESSMENT — PAIN SCALES - GENERAL: PAINLEVEL_OUTOF10: 5 - MODERATE PAIN

## 2025-08-06 NOTE — PREPROCEDURE INSTRUCTIONS
Medication List            Accurate as of August 6, 2025  8:42 AM. Always use your most recent med list.                chlorhexidine 0.12 % solution  Commonly known as: Peridex  Use as directed     Eliquis 5 mg tablet  Generic drug: apixaban  TAKE 1 TABLET BY MOUTH TWICE A DAY  Medication Adjustments for Surgery: Take last dose 2 days before surgery  Notes to patient: Last dose will be 8/17/25 evening dose     estradioL 10 mcg insert  Insert 10 mcg into the vagina 2 times a week.  Medication Adjustments for Surgery: Take/Use as prescribed     FISH OIL ORAL  Additional Medication Adjustments for Surgery: Take last dose 7 days before surgery     simvastatin 20 mg tablet  Commonly known as: Zocor  TAKE 1 TABLET (20 MG) BY MOUTH ONCE DAILY AT BEDTIME.  Medication Adjustments for Surgery: Take/Use as prescribed     * Synthroid 50 mcg tablet  Generic drug: levothyroxine  TAKE 1 TABLET BY MOUTH EVERY OTHER DAY, ALTERNATING WITH 1/2 TABLET EVERY OTHER DAY  Medication Adjustments for Surgery: Take on the morning of surgery           * triamterene-hydrochlorothiazid 37.5-25 mg tablet  Commonly known as: Maxzide-25  Take 1 tablet by mouth once daily. Do not start before July 30, 2025.  Medication Adjustments for Surgery: Take on the morning of surgery           * This list has 4 medication(s) that are the same as other medications prescribed for you. Read the directions carefully, and ask your doctor or other care provider to review them with you.                        Preoperative Fasting Guidelines    Why must I stop eating and drinking near surgery time?  With sedation, food or liquid in your stomach can enter your lungs causing serious complications  Increases nausea and vomiting    When do I need to stop eating and drinking before my surgery?  Do not eat any food after midnight the night before your surgery/procedure.  You may have up to 13.5 ounces of clear liquid until TWO hours before your instructed arrival time  to the hospital.  This includes water, black tea/coffee, (no milk or cream) apple juice, and electrolyte drinks (Gatorade) -DRINK STRAWBERRY CARBOHYDRATE DRINK IN ERAS KIT  You may chew gum until TWO hours before your surgery/procedure    PAT DISCHARGE INSTRUCTIONS    The Same Day Surgery (SDS) Department of the hospital where your procedure will be performed will contact you after 2:00 PM the day before your surgery. If you are scheduled on a Monday, or a Tuesday following a Monday holiday, they will call on the last business day prior to your surgery.  Please check your voicemail for any missed messages.        [facility]  [id number] [place] [address], [address] [phone]  Second Floor  *PLEASE CALL ABOVE NUMBER FOR ARRIVAL TIME         Please let your surgeon know if:      You develop any open sores, shingles, burning or painful urination as these may increase your risk of an infection.   You no longer wish to have the surgery.   Any other personal circumstances change that may lead to the need to cancel or defer this surgery-such as being sick or getting admitted to any hospital within one week of your planned procedure.    Your contact details change, such as a change of address or phone number.    Starting now:     Please DO NOT drink alcohol or smoke for 24 hours before surgery. It is well known that quitting smoking can make a huge difference to your health and recovery from surgery. The longer you abstain from smoking, the better your chances of a healthy recovery. If you need help with quitting, call 9-515-QUIT-NOW to be connected to a trained counselor who will discuss the best methods to help you quit.     Before your surgery:    Please stop all supplements 7 days prior to surgery. Or as directed by your surgeon.   Please stop taking NSAID pain medicine such as Advil and Motrin 7 days before surgery.    If you develop any fever, cough, cold, rashes, cuts,  scratches, scrapes, urinary symptoms or infection anywhere on your body (including teeth and gums) prior to surgery, please call your surgeon’s office as soon as possible. This may require treatment to reduce the chance of cancellation on the day of surgery.    The day before your surgery:   DIET- Please follow the diet instructions at the top of your packet.   Get a good night’s rest.  Use the special soap for bathing if you have been instructed to use one.    Scheduled surgery times may change and you will be notified if this occurs - please check your personal voicemail for any updates.     On the morning of surgery:   Wear comfortable, loose fitting clothes which open in the front. Please do not wear moisturizers, creams, lotions, makeup or perfume.    Please bring with you to surgery:   Photo ID and insurance card   Current list of medicines and allergies   Pacemaker/ Defibrillator/Heart stent cards   CPAP machine and mask    Slings/ splints/ crutches   A copy of your complete advanced directive/DHPOA.    Please do NOT bring with you to surgery:   All jewelry and valuables should be left at home.   Prosthetic devices such as contact lenses, hearing aids, dentures, eyelash extensions, hairpins and body piercings must be removed prior to going in to the surgical suite.    After outpatient surgery:   A responsible adult MUST accompany you at the time of discharge and stay with you for 24 hours after your surgery. You may NOT drive yourself home after surgery.    Do not drive, operate machinery, make critical decisions or do activities that require co-ordination or balance until after a night’s sleep.   Do not drink alcoholic beverages for 24 hours.   Instructions for resuming your medications will be provided by your surgeon.    CALL YOUR DOCTOR AFTER SURGERY IF YOU HAVE:     Chills and/or a fever of 101° F or higher.    Redness, swelling, pus or drainage from your surgical wound or a bad smell from the wound.     Lightheadedness, fainting or confusion.    Persistent vomiting (throwing up) and are not able to eat or drink for 12 hours.    Three or more loose, watery bowel movements in 24 hours (diarrhea).   Difficulty or pain while urinating( after non-urological surgery)    Pain and swelling in your legs, especially if it is only on one side.    Difficulty breathing or are breathing faster than normal.    Any new concerning symptoms.      Patient Information: Pre-Operative Infection Prevention Measures     Why did I have my nose, under my arms, and groin swabbed?  The purpose of the swab is to identify Staphylococcus aureus inside your nose or on your skin.  The swab was sent to the laboratory for culture.  A positive swab/culture for Staphylococcus aureus is called colonization or carriage.      What is Staphylococcus aureus?  Staphylococcus aureus, also known as “staph”, is a germ found on the skin or in the nose of healthy people.  Sometimes Staphylococcus aureus can get into the body and cause an infection.  This can be minor (such as pimples, boils, or other skin problems).  It might also be serious (such as a blood infection, pneumonia, or a surgical site infection).    What is Staphylococcus aureus colonization or carriage?  Colonization or carriage means that a person has the germ but is not sick from it.  These bacteria can be spread on the hands or when breathing or sneezing.    How is Staphylococcus aureus spread?  It is most often spread by close contact with a person or item that carries it.    What happens if my culture is positive for Staphylococcus aureus?  Your doctor/medical team will use this information to guide any antibiotic treatment which may be necessary.  Regardless of the culture results, we will clean the inside of your nose with a betadine swab just before you have your surgery.      Will I get an infection if I have Staphylococcus aureus in my nose or on my skin?  Anyone can get an infection  with Staphylococcus aureus.  However, the best way to reduce your risk of infection is to follow the instructions provided to you for the use of your CHG soap and dental rinse.        Patient Information: Oral/Dental Rinse    What is oral/dental rinse?   It is a mouthwash. It is a way of cleaning the mouth with a germ-killing solution before your surgery.  The solution contains chlorhexidine, commonly known as CHG.   It is used inside the mouth to kill a bacteria known as Staphylococcus aureus.  Let your doctor know if you are allergic to Chlorhexidine.    Why do I need to use CHG oral/dental rinse?  The CHG oral/dental rinse helps to kill a bacteria in your mouth known as Staphylococcus aureus.     This reduces the risk of infection at the surgical site.      Using your CHG oral/dental rinse  STEPS:  Use your CHG oral/dental rinse after you brush your teeth the night before (at bedtime) and the morning of your surgery.  Follow all directions on your prescription label.    Use the cap on the container to measure 15ml   Swish (gargle if you can) the mouthwash in your mouth for at least 30 seconds, (do not swallow) and spit out  After you use your CHG rinse, do not rinse your mouth with water, drink or eat.  Please refer to the prescription label for the appropriate time to resume oral intake      What side effects might I have using the CHG oral/dental rinse?  CHG rinse will stick to plaque on the teeth.  Brush and floss just before use.  Teeth brushing will help avoid staining of plaque during use.      Patient Information: Home Preoperative Antibacterial Shower      What is a home preoperative antibacterial shower?  This shower is a way of cleaning the skin with a germ-killing solution before surgery.  The solution contains chlorhexidine, commonly known as CHG.  CHG is a skin cleanser with germ-killing ability.  Let your doctor know if you are allergic to chlorhexidine.    Why do I need to take a preoperative  antibacterial shower?  Skin is not sterile.  It is best to try to make your skin as free of germs as possible before surgery.  Proper cleansing with a germ-killing soap before surgery can lower the number of germs on your skin.  This helps to reduce the risk of infection at the surgical site.  Following the instructions listed below will help you prepare your skin for surgery.      How do I use the solution?  Steps:  Begin using your CHG soap 5 days before your scheduled surgery on ___8/20/25 -- start wash 8/16/25___.    First, wash and rinse your hair using the CHG soap. Keep CHG soap away from ear canals and eyes.  Rinse completely, do not condition.  Hair extensions should be removed.  Wash your face with your normal soap and rinse.    Apply the CHG solution to a clean wet washcloth.  Turn the water off or move away from the water spray to avoid premature rinsing of the CHG soap as you are applying.   Firmly lather your entire body from the neck down.  Do not use on your face.  Pay special attention to the area(s) where your incision(s) will be located unless they are on your face.  Avoid scrubbing your skin too hard.  The important point is to have the CHG soap sit on your skin for 3 minutes.    When the 3 minutes are up, turn on the water and rinse the CHG solution off your body completely.   DO NOT wash with regular soap after you have used the CHG soap solution  Pat yourself dry with a clean, freshly-laundered towel.  DO NOT apply powders, deodorants, or lotions.  Dress in clean, freshly laundered nightclothes.    Be sure to sleep with clean, freshly laundered sheets.  Be aware that CHG will cause stains on fabrics; if you wash them with bleach after use.  Rinse your washcloth and other linens that have contact with CHG completely.  Use only non-chlorine detergents to launder the items used.   The morning of surgery is the fifth day.  Repeat the above steps and dress in clean comfortable clothing     Whom  should I contact if I have any questions regarding the use of CHG soap?  Call the [facility] at [phone] if you have any questions.

## 2025-08-06 NOTE — CPM/PAT H&P
CPM/PAT Evaluation       Name: Jessy Mcdaniel (Jessy Mcdaniel)  /Age: 1945/79 y.o.     In-Person       Chief Complaint: Right hip pain    HPI: Levar Mcdaniel is a 79 year old female with complaints of right hip pain for over a few years. She states she has pain that radiates down her right leg. She states the right leg does give out. She states she has swelling in the right leg. She denies numbness and tingling. She states her most pain is when going from a sitting to standing position. She takes Tylenol at home for pain if needed.  She is scheduled for a total right hip arthroplasty. She denies fever. Chills, nausea, vomiting, chest pain, SOB, dizziness, and palpitations.     Medical History[1]    Surgical History[2]    Social History     Tobacco Use    Smoking status: Never     Passive exposure: Never    Smokeless tobacco: Never   Substance Use Topics    Alcohol use: Never     Social History     Substance and Sexual Activity   Drug Use Never         Family History[3]    Allergies[4]  Current Outpatient Medications   Medication Sig Dispense Refill    apixaban (Eliquis) 5 mg tablet TAKE 1 TABLET BY MOUTH TWICE A  tablet 0    estradioL 10 mcg insert Insert 10 mcg into the vagina 2 times a week. 30 each 3    levothyroxine (Synthroid, Levoxyl) 25 mcg tablet Take 1 tablet (25 mcg) by mouth every other day. Take on an empty stomach at the same time each day, either 30 to 60 minutes prior to breakfast 15 tablet 2    simvastatin (Zocor) 20 mg tablet TAKE 1 TABLET (20 MG) BY MOUTH ONCE DAILY AT BEDTIME. 90 tablet 3    Synthroid 50 mcg tablet TAKE 1 TABLET BY MOUTH EVERY OTHER DAY, ALTERNATING WITH 1/2 TABLET EVERY OTHER DAY 63 tablet 5    triamterene-hydrochlorothiazid (Maxzide-25) 37.5-25 mg tablet TAKE 1/2 TABLET BY MOUTH ONCE DAILY IN THE MORNING. 45 tablet 3    triamterene-hydrochlorothiazid (Maxzide-25) 37.5-25 mg tablet Take 1 tablet by mouth once daily. Do not start before 2025. 30 tablet 1      Current Facility-Administered Medications   Medication Dose Route Frequency Provider Last Rate Last Admin    denosumab (Prolia) injection 60 mg  60 mg subcutaneous q6 months Ysabel Varela MD   60 mg at 06/16/25 0912        Review of Systems   Constitutional: Negative.    HENT: Negative.     Eyes: Negative.    Respiratory: Negative.     Cardiovascular: Negative.    Gastrointestinal: Negative.    Endocrine: Negative.    Genitourinary: Negative.    Musculoskeletal:  Positive for gait problem and joint swelling.        Right hip pain   Skin: Negative.    Allergic/Immunologic: Negative.    Hematological: Negative.    Psychiatric/Behavioral: Negative.               Physical Exam  Vitals reviewed.   Constitutional:       Appearance: Normal appearance.   HENT:      Head: Normocephalic and atraumatic.      Nose: Nose normal.      Mouth/Throat:      Mouth: Mucous membranes are moist.      Pharynx: Oropharynx is clear.     Eyes:      Extraocular Movements: Extraocular movements intact.      Conjunctiva/sclera: Conjunctivae normal.       Cardiovascular:      Rate and Rhythm: Normal rate and regular rhythm.      Pulses: Normal pulses.      Heart sounds: Normal heart sounds.   Pulmonary:      Effort: Pulmonary effort is normal.      Breath sounds: Normal breath sounds.   Abdominal:      Palpations: Abdomen is soft.   Genitourinary:     Comments: Assessment deferred to physician    Musculoskeletal:      Cervical back: Normal range of motion.      Right lower leg: Edema present.      Comments: Limited ROM in right leg     Skin:     General: Skin is warm and dry.     Neurological:      General: No focal deficit present.      Mental Status: She is alert and oriented to person, place, and time.     Psychiatric:         Mood and Affect: Mood normal.         Behavior: Behavior normal.         Thought Content: Thought content normal.         Judgment: Judgment normal.          PAT AIRWAY:   Airway:     Mallampati::  II    TM  "distance::  >3 FB    Neck ROM::  Full  normal          Visit Vitals  /60   Pulse 71   Temp 36.8 °C (98.2 °F) (Temporal)   Resp 18   Ht 1.651 m (5' 5\")   Wt 73.6 kg (162 lb 4.1 oz)   LMP  (LMP Unknown)   SpO2 98%   BMI 27.00 kg/m²   OB Status Postmenopausal   Smoking Status Never   BSA 1.84 m²   ASA: III  CHADS2:4.0  RCRI: 0.4  DASI: 21.45  METS:5.4  STOP BAN        Assessment and Plan:     Primary osteoarthritis of right hip : ARTHROPLASTY, HIP, TOTAL, POSTERIOR APPROACH   Atrial fibrillation ablation: Eliquis Ablation 25. Dr. Aguila gave clearance for surgery but message was sent to confirm this patient can come off blood thinner soon since her ablation was 25. Waiting on response.   Hypertension: Maxzide-  Hyperlipidemia: Simvastatin  Hypothyroid: Synthroid  SHANDA: C-PAP  BMI:27.00    Cardiac Clearance Dr. Aguila 25 ( [facility])  LABS: CBC, BMP done 25, Hemoglobin A1C, MRSA, type and screen ordered in PAT  EKG 7/15/25  TTE 25  CONCLUSIONS:   1. Left ventricular ejection fraction is normal by visual estimate at 60-65%.   2. There is normal right ventricular global systolic function.   3. There is no evidence of cardiac tamponade.    Ivania Severino, APRN-CNP           [1]   Past Medical History:  Diagnosis Date    Atrial fibrillation and flutter     Bilateral knee pain     Colon cancer (Multi)     Disease of thyroid gland     GERD (gastroesophageal reflux disease)     High cholesterol     Hx antineoplastic chemo     Hypertension     Hypothyroidism     Leg edema, right     Osteoarthritis     Osteoporosis     Premature atrial contraction     Sleep apnea     CPAP compliant   [2]   Past Surgical History:  Procedure Laterality Date    APPENDECTOMY      BI STEREOTACTIC GUIDED BREAST RIGHT LOCALIZATION AND BIOPSY Right 2014    BI STEREOTACTIC GUIDED BREAST LOCALIZATION AND BIOPSY RIGHT [facility]    BI STEREOTACTIC GUIDED BREAST RIGHT LOCALIZATION AND BIOPSY Right " 11/27/2017    BI STEREOTACTIC GUIDED BREAST LOCALIZATION AND BIOPSY RIGHT [facility]    BI US GUIDED BREAST LOCALIZATION AND BIOPSY LEFT Left 12/09/2022    BI US GUIDED BREAST LOCALIZATION AND BIOPSY LEFT [facility]    BREAST CYST ASPIRATION  [id number]    CARDIAC ELECTROPHYSIOLOGY PROCEDURE N/A 7/28/2025    Procedure: Ablation Atrial Flutter;  Surgeon: Duglas Aguila MD;  Location: [facility] Card[facility] Lab;  Service: Electrophysiology;  Laterality: N/A;  ATRIAL FLUTTER ABLATION/ ENSITE    CATARACT EXTRACTION W/ INTRAOCULAR LENS  IMPLANT, BILATERAL      COLON SURGERY  1992    COLONOSCOPY      HYSTERECTOMY      JOINT REPLACEMENT      KNEE ARTHROSCOPY W/ MENISCECTOMY Left     And Baker's cyst removal    KNEE SURGERY Right 03/13/2024    total    WISDOM TOOTH EXTRACTION     [3]   Family History  Problem Relation Name Age of Onset    Coronary artery disease Mother      Heart disease Mother      Stroke Father     [4]   Allergies  Allergen Reactions    Compazine [Prochlorperazine] Anxiety and Hallucinations

## 2025-08-07 LAB
BACTERIA UR CULT: NORMAL
STAPHYLOCOCCUS SPEC CULT: ABNORMAL

## 2025-08-14 ENCOUNTER — TELEPHONE (OUTPATIENT)
Facility: CLINIC | Age: 80
End: 2025-08-14
Payer: MEDICARE

## 2025-08-25 ENCOUNTER — TELEPHONE (OUTPATIENT)
Dept: ORTHOPEDIC SURGERY | Facility: CLINIC | Age: 80
End: 2025-08-25
Payer: MEDICARE

## 2025-09-03 ENCOUNTER — LAB (OUTPATIENT)
Facility: HOSPITAL | Age: 80
End: 2025-09-03
Payer: MEDICARE

## 2025-09-03 ENCOUNTER — PRE-ADMISSION TESTING (OUTPATIENT)
Dept: PREADMISSION TESTING | Facility: HOSPITAL | Age: 80
End: 2025-09-03
Payer: MEDICARE

## 2025-09-03 ENCOUNTER — TELEPHONE (OUTPATIENT)
Facility: CLINIC | Age: 80
End: 2025-09-03

## 2025-09-03 VITALS
DIASTOLIC BLOOD PRESSURE: 89 MMHG | TEMPERATURE: 97.9 F | SYSTOLIC BLOOD PRESSURE: 120 MMHG | RESPIRATION RATE: 16 BRPM | OXYGEN SATURATION: 99 % | HEIGHT: 65 IN | WEIGHT: 166.45 LBS | HEART RATE: 136 BPM | BODY MASS INDEX: 27.73 KG/M2

## 2025-09-03 DIAGNOSIS — I48.3 TYPICAL ATRIAL FLUTTER (MULTI): Primary | ICD-10-CM

## 2025-09-03 DIAGNOSIS — Z01.818 PREOP TESTING: Primary | ICD-10-CM

## 2025-09-03 LAB
ABO GROUP (TYPE) IN BLOOD: NORMAL
ANTIBODY SCREEN: NORMAL
BASOPHILS # BLD AUTO: 0.01 X10*3/UL (ref 0–0.1)
BASOPHILS NFR BLD AUTO: 0.1 %
EOSINOPHIL # BLD AUTO: 0.02 X10*3/UL (ref 0–0.4)
EOSINOPHIL NFR BLD AUTO: 0.3 %
ERYTHROCYTE [DISTWIDTH] IN BLOOD BY AUTOMATED COUNT: 13.2 % (ref 11.5–14.5)
HCT VFR BLD AUTO: 40.2 % (ref 36–46)
HGB BLD-MCNC: 12.9 G/DL (ref 12–16)
IMM GRANULOCYTES # BLD AUTO: 0.02 X10*3/UL (ref 0–0.5)
IMM GRANULOCYTES NFR BLD AUTO: 0.3 % (ref 0–0.9)
LYMPHOCYTES # BLD AUTO: 1.57 X10*3/UL (ref 0.8–3)
LYMPHOCYTES NFR BLD AUTO: 21.7 %
MCH RBC QN AUTO: 32.3 PG (ref 26–34)
MCHC RBC AUTO-ENTMCNC: 32.1 G/DL (ref 32–36)
MCV RBC AUTO: 101 FL (ref 80–100)
MONOCYTES # BLD AUTO: 0.45 X10*3/UL (ref 0.05–0.8)
MONOCYTES NFR BLD AUTO: 6.2 %
NEUTROPHILS # BLD AUTO: 5.18 X10*3/UL (ref 1.6–5.5)
NEUTROPHILS NFR BLD AUTO: 71.4 %
NRBC BLD-RTO: 0 /100 WBCS (ref 0–0)
PLATELET # BLD AUTO: 263 X10*3/UL (ref 150–450)
RBC # BLD AUTO: 4 X10*6/UL (ref 4–5.2)
RH FACTOR (ANTIGEN D): NORMAL
WBC # BLD AUTO: 7.3 X10*3/UL (ref 4.4–11.3)

## 2025-09-03 PROCEDURE — 93010 ELECTROCARDIOGRAM REPORT: CPT | Performed by: INTERNAL MEDICINE

## 2025-09-03 PROCEDURE — 85025 COMPLETE CBC W/AUTO DIFF WBC: CPT

## 2025-09-03 PROCEDURE — 93005 ELECTROCARDIOGRAM TRACING: CPT

## 2025-09-03 PROCEDURE — 99214 OFFICE O/P EST MOD 30 MIN: CPT | Performed by: NURSE PRACTITIONER

## 2025-09-03 PROCEDURE — 36415 COLL VENOUS BLD VENIPUNCTURE: CPT

## 2025-09-03 PROCEDURE — 86900 BLOOD TYPING SEROLOGIC ABO: CPT

## 2025-09-03 RX ORDER — METOPROLOL SUCCINATE 50 MG/1
50 TABLET, EXTENDED RELEASE ORAL DAILY
Qty: 30 TABLET | Refills: 11 | Status: SHIPPED | OUTPATIENT
Start: 2025-09-03 | End: 2026-09-03

## 2025-09-03 RX ORDER — DENOSUMAB 60 MG/ML
60 INJECTION SUBCUTANEOUS
COMMUNITY

## 2025-09-03 RX ORDER — FLECAINIDE ACETATE 50 MG/1
50 TABLET ORAL 2 TIMES DAILY
Qty: 60 TABLET | Refills: 11 | Status: SHIPPED | OUTPATIENT
Start: 2025-09-03 | End: 2026-09-03

## 2025-09-03 RX ORDER — CHOLECALCIFEROL (VITAMIN D3) 25 MCG
25 TABLET ORAL DAILY
COMMUNITY

## 2025-09-03 RX ORDER — CHLORHEXIDINE GLUCONATE ORAL RINSE 1.2 MG/ML
SOLUTION DENTAL
Qty: 473 ML | Refills: 0 | Status: SHIPPED | OUTPATIENT
Start: 2025-09-03 | End: 2025-09-09

## 2025-09-03 ASSESSMENT — PAIN DESCRIPTION - DESCRIPTORS: DESCRIPTORS: SHARP

## 2025-09-03 ASSESSMENT — ENCOUNTER SYMPTOMS
DIZZINESS: 0
ENDOCRINE NEGATIVE: 1
ACTIVITY CHANGE: 1
SHORTNESS OF BREATH: 0
RESPIRATORY NEGATIVE: 1
PALPITATIONS: 0
GASTROINTESTINAL NEGATIVE: 1
ARTHRALGIAS: 1
HEMATOLOGIC/LYMPHATIC NEGATIVE: 1

## 2025-09-03 ASSESSMENT — PAIN - FUNCTIONAL ASSESSMENT: PAIN_FUNCTIONAL_ASSESSMENT: 0-10

## 2025-09-03 ASSESSMENT — DUKE ACTIVITY SCORE INDEX (DASI)
CAN YOU DO HEAVY WORK AROUND THE HOUSE LIKE SCRUBBING FLOORS OR LIFTING AND MOVING HEAVY FURNITURE: NO
CAN YOU DO YARD WORK LIKE RAKING LEAVES, WEEDING OR PUSHING A MOWER: NO
CAN YOU PARTICIPATE IN MODERATE RECREATIONAL ACTIVITIES LIKE GOLF, BOWLING, DANCING, DOUBLES TENNIS OR THROWING A BASEBALL OR FOOTBALL: NO
CAN YOU WALK INDOORS, SUCH AS AROUND YOUR HOUSE: YES
CAN YOU CLIMB A FLIGHT OF STAIRS OR WALK UP A HILL: YES
CAN YOU TAKE CARE OF YOURSELF (EAT, DRESS, BATHE, OR USE TOILET): YES
CAN YOU DO MODERATE WORK AROUND THE HOUSE LIKE VACUUMING, SWEEPING FLOORS OR CARRYING GROCERIES: YES
TOTAL_SCORE: 24.2
CAN YOU WALK A BLOCK OR TWO ON LEVEL GROUND: YES
DASI METS SCORE: 5.7
CAN YOU HAVE SEXUAL RELATIONS: YES
CAN YOU DO LIGHT WORK AROUND THE HOUSE LIKE DUSTING OR WASHING DISHES: YES
CAN YOU PARTICIPATE IN STRENOUS SPORTS LIKE SWIMMING, SINGLES TENNIS, FOOTBALL, BASKETBALL, OR SKIING: NO
CAN YOU RUN A SHORT DISTANCE: NO

## 2025-09-03 ASSESSMENT — PAIN SCALES - GENERAL: PAINLEVEL_OUTOF10: 7

## 2025-09-04 ENCOUNTER — TELEPHONE (OUTPATIENT)
Facility: CLINIC | Age: 80
End: 2025-09-04
Payer: MEDICARE

## 2025-09-07 LAB
ATRIAL RATE: 264 BPM
P AXIS: -35 DEGREES
P OFFSET: 217 MS
P ONSET: 142 MS
Q ONSET: 219 MS
QRS COUNT: 21 BEATS
QRS DURATION: 66 MS
QT INTERVAL: 266 MS
QTC CALCULATION(BAZETT): 394 MS
QTC FREDERICIA: 346 MS
R AXIS: 127 DEGREES
T AXIS: 168 DEGREES
T OFFSET: 352 MS
VENTRICULAR RATE: 132 BPM

## 2025-09-09 ENCOUNTER — APPOINTMENT (OUTPATIENT)
Facility: CLINIC | Age: 80
End: 2025-09-09
Payer: MEDICARE

## 2025-09-16 ENCOUNTER — APPOINTMENT (OUTPATIENT)
Dept: ORTHOPEDIC SURGERY | Facility: CLINIC | Age: 80
End: 2025-09-16
Payer: MEDICARE

## 2025-10-13 ENCOUNTER — APPOINTMENT (OUTPATIENT)
Dept: SLEEP MEDICINE | Facility: CLINIC | Age: 80
End: 2025-10-13
Payer: COMMERCIAL

## (undated) DEVICE — DRAPE, SHEET, U, W/ADHESIVE STRIP, IMPERVIOUS, 60 X 70 IN, DISPOSABLE, LF, STERILE

## (undated) DEVICE — SOLUTION, IRRIGATION, X RX SODIUM CHL 0.9%, 1000ML BTL

## (undated) DEVICE — Device

## (undated) DEVICE — CABLE, OCTAPOLAR, EASYMATE 8 125CML

## (undated) DEVICE — DRAPE, ISOLATION, INCISE, W/POUCH, STERI DRAPE, 125 X 83 IN, DISPOSABLE, STERILE

## (undated) DEVICE — CATHETER, ADVISOR HD GRID X MAPPING, BI-DIRECTIONAL, SENSOR ENABLED

## (undated) DEVICE — TUBING SET, COOL POINT, 2.6M, INDIVIDUAL

## (undated) DEVICE — FACE SHIELD, OPTI-COM

## (undated) DEVICE — CATHETER, ELECTRODE, STEERABLE, EP-XT, LARGE CURVE, 6 FR X 110 CM

## (undated) DEVICE — NEEDLE, TRANSSEPTAL, BROCKENBROUGH CURVE, ADULT, 18 G X 71 CM

## (undated) DEVICE — BLADE, SAW, DUAL SAGITTAL, 1.9 X 90 X 30

## (undated) DEVICE — CLOSURE SYSTEM, VASCADE MVP XL, 10-12FR

## (undated) DEVICE — SHEATH, PINNACLE, W/.038 GW 10CM, 5FR INTRODUCER, 2.5 CM DIALATOR

## (undated) DEVICE — SUTURE, MONOCRYL, 4-0, 27 IN, PS-2, UNDYED

## (undated) DEVICE — HEMOSTATIC, MATRIX, SURGIFLO, 8ML

## (undated) DEVICE — GLOVE, SURGICAL, PROTEXIS PI BLUE W/NEUTHERA, 7.5, PF, LF

## (undated) DEVICE — SUTURE, VICRYL, 1, 27 IN, CT-1, VIOLET

## (undated) DEVICE — SUTURE, VICRYL, 2-0, 36 IN, CT-1, UNDYED

## (undated) DEVICE — COVER, EQUIPMENT, ZERO GRAVITY

## (undated) DEVICE — SHEATH, BRITE TIP 10 X 11CM

## (undated) DEVICE — SOLUTION, IRRIGATION, USP, SODIUM CHLORIDE 0.9%, 3000 ML, BAG

## (undated) DEVICE — CEMENT, MIXEVAC III, 10S BOWL, KNEES

## (undated) DEVICE — WOUND SYSTEM, DEBRIDEMENT & CLEANING, O.R DUOPAK

## (undated) DEVICE — INTRODUCER, PERCUTANEOUS, SHEATH AVANTI PLUS, W/MINI GUIDEWIRE, STANDARD LENGTH, 8 FR, 11 CM

## (undated) DEVICE — INTRODUCER, SHEATH, FAST-CATH, 7FR X 12CM, C-LOCK

## (undated) DEVICE — GUIDEWIRE, AMPLATZ, TFE, EXTRA STIFF, CURVED, .035/180CM/3MMTIP

## (undated) DEVICE — ELECTRODE KIT, ENSITE X EP SYSTEM SURFACE

## (undated) DEVICE — PAD, ELECTRODE DEFIB PADPRO ADULT STRL W/ADAPTER

## (undated) DEVICE — INTRODUCER, AGILIS, LRG CURL, 8FR X 71CM, DUAL

## (undated) DEVICE — SUTURE, VICRYL, 1, 27 IN, TP-1, VIOLET

## (undated) DEVICE — BANDAGE, ELASTIC, SELF-CLOSE, 6 IN X 10 YD, STERILE, LF

## (undated) DEVICE — CATHETER, TACTIFLEX, BI-D ABLATION, 8FR 2-2-2MM F-J CURVE

## (undated) DEVICE — BLADE, RECIPROCATING, LARGE, 12.7MM

## (undated) DEVICE — PACK, ANGIO P2, CUSTOM, LAKE

## (undated) DEVICE — INTRODUCER, TRANSSEPTAL GUIDE, SWARTZ, SLO 8FR/63CM

## (undated) DEVICE — STRIP, SKIN CLOSURE, STERI STRIP, REINFORCED, 0.5 X 4 IN

## (undated) DEVICE — HANDPIECE, INTERPULSE, W/RETRACTABLE COAX FAN, STERILE

## (undated) DEVICE — BANDAGE, ELASTIC, ACE, ACE, DOUBLE LENGTH, 6 X 550 IN, LF

## (undated) DEVICE — CATHETER, ELCTROPHYSIOLOGY, STEERABLE, DUO DEC, 20 ELECTRODE, 2-5-2 MM SPACING, SUPER LARGE CURL, 7 FR X 95 CM

## (undated) DEVICE — CATHETER, VIEW FLEX XTRA ICE, 9FR

## (undated) DEVICE — CLOSURE SYSTEM, VASCULAR, MVP 6-12FR, VENOUS